# Patient Record
Sex: FEMALE | Race: BLACK OR AFRICAN AMERICAN | Employment: PART TIME | URBAN - METROPOLITAN AREA
[De-identification: names, ages, dates, MRNs, and addresses within clinical notes are randomized per-mention and may not be internally consistent; named-entity substitution may affect disease eponyms.]

---

## 2017-03-17 ENCOUNTER — HOSPITAL ENCOUNTER (OUTPATIENT)
Dept: GENERAL RADIOLOGY | Age: 37
Discharge: HOME | End: 2017-03-17
Attending: FAMILY MEDICINE

## 2017-03-17 DIAGNOSIS — R10.9 ABDOMINAL PAIN: ICD-10-CM

## 2017-03-27 ENCOUNTER — HOSPITAL ENCOUNTER (OUTPATIENT)
Dept: GENERAL RADIOLOGY | Age: 37
Discharge: HOME | End: 2017-03-27
Attending: INTERNAL MEDICINE
Payer: COMMERCIAL

## 2017-03-27 DIAGNOSIS — K58.0 IRRITABLE BOWEL SYNDROME WITH DIARRHEA: ICD-10-CM

## 2017-03-27 DIAGNOSIS — R10.9 ABDOMINAL PAIN: ICD-10-CM

## 2017-03-27 PROCEDURE — 74011000250 HC RX REV CODE- 250: Performed by: INTERNAL MEDICINE

## 2017-03-27 PROCEDURE — 74011000255 HC RX REV CODE- 255: Performed by: INTERNAL MEDICINE

## 2017-03-27 PROCEDURE — 74245 XR UPPER GI/SMALL BOWEL: CPT

## 2017-03-27 RX ADMIN — BARIUM SULFATE 135 ML: 980 POWDER, FOR SUSPENSION ORAL at 10:30

## 2017-03-27 RX ADMIN — ANTACID/ANTIFLATULENT 4 G: 380; 550; 10; 10 GRANULE, EFFERVESCENT ORAL at 10:30

## 2017-03-27 RX ADMIN — BARIUM SULFATE 176 G: 960 POWDER, FOR SUSPENSION ORAL at 10:30

## 2017-08-02 ENCOUNTER — HOSPITAL ENCOUNTER (OUTPATIENT)
Dept: GENERAL RADIOLOGY | Age: 37
Discharge: HOME | End: 2017-08-02
Payer: COMMERCIAL

## 2017-08-02 DIAGNOSIS — M25.512 ACUTE PAIN OF LEFT SHOULDER: ICD-10-CM

## 2017-08-02 DIAGNOSIS — M54.2 NECK PAIN, ACUTE: ICD-10-CM

## 2017-08-02 PROCEDURE — 73030 X-RAY EXAM OF SHOULDER: CPT

## 2017-08-02 PROCEDURE — 72040 X-RAY EXAM NECK SPINE 2-3 VW: CPT

## 2017-09-08 ENCOUNTER — HOSPITAL ENCOUNTER (EMERGENCY)
Age: 37
Discharge: HOME | End: 2017-09-08
Attending: EMERGENCY MEDICINE
Payer: COMMERCIAL

## 2017-09-08 VITALS
HEIGHT: 66 IN | OXYGEN SATURATION: 100 % | SYSTOLIC BLOOD PRESSURE: 151 MMHG | HEART RATE: 68 BPM | BODY MASS INDEX: 28.93 KG/M2 | WEIGHT: 180 LBS | TEMPERATURE: 97.4 F | RESPIRATION RATE: 16 BRPM | DIASTOLIC BLOOD PRESSURE: 100 MMHG

## 2017-09-08 DIAGNOSIS — R07.89 ATYPICAL CHEST PAIN: Primary | ICD-10-CM

## 2017-09-08 LAB
ANION GAP SERPL CALC-SCNC: 7 MMOL/L (ref 3–18)
ATRIAL RATE: 61 BPM
BASOPHILS # BLD: 0 K/UL (ref 0–0.06)
BASOPHILS NFR BLD: 1 % (ref 0–2)
BUN SERPL-MCNC: 7 MG/DL (ref 7–18)
BUN/CREAT SERPL: 8 (ref 12–20)
CALCIUM SERPL-MCNC: 9.7 MG/DL (ref 8.5–10.1)
CALCULATED P AXIS, ECG09: 43 DEGREES
CALCULATED R AXIS, ECG10: 1 DEGREES
CALCULATED T AXIS, ECG11: 86 DEGREES
CHLORIDE SERPL-SCNC: 103 MMOL/L (ref 100–108)
CK MB CFR SERPL CALC: NORMAL % (ref 0–4)
CK MB SERPL-MCNC: <1 NG/ML (ref 5–25)
CK SERPL-CCNC: 84 U/L (ref 26–192)
CO2 SERPL-SCNC: 32 MMOL/L (ref 21–32)
CREAT SERPL-MCNC: 0.86 MG/DL (ref 0.6–1.3)
DIAGNOSIS, 93000: NORMAL
DIFFERENTIAL METHOD BLD: ABNORMAL
EOSINOPHIL # BLD: 0.1 K/UL (ref 0–0.4)
EOSINOPHIL NFR BLD: 2 % (ref 0–5)
ERYTHROCYTE [DISTWIDTH] IN BLOOD BY AUTOMATED COUNT: 16.3 % (ref 11.6–14.5)
GLUCOSE SERPL-MCNC: 89 MG/DL (ref 74–99)
HCT VFR BLD AUTO: 39.2 % (ref 35–45)
HGB BLD-MCNC: 12.6 G/DL (ref 12–16)
LYMPHOCYTES # BLD: 2 K/UL (ref 0.9–3.6)
LYMPHOCYTES NFR BLD: 46 % (ref 21–52)
MCH RBC QN AUTO: 25.9 PG (ref 24–34)
MCHC RBC AUTO-ENTMCNC: 32.1 G/DL (ref 31–37)
MCV RBC AUTO: 80.7 FL (ref 74–97)
MONOCYTES # BLD: 0.4 K/UL (ref 0.05–1.2)
MONOCYTES NFR BLD: 11 % (ref 3–10)
NEUTS SEG # BLD: 1.7 K/UL (ref 1.8–8)
NEUTS SEG NFR BLD: 40 % (ref 40–73)
P-R INTERVAL, ECG05: 172 MS
PLATELET # BLD AUTO: 295 K/UL (ref 135–420)
PMV BLD AUTO: 10.1 FL (ref 9.2–11.8)
POTASSIUM SERPL-SCNC: 3.7 MMOL/L (ref 3.5–5.5)
Q-T INTERVAL, ECG07: 432 MS
QRS DURATION, ECG06: 90 MS
QTC CALCULATION (BEZET), ECG08: 434 MS
RBC # BLD AUTO: 4.86 M/UL (ref 4.2–5.3)
SODIUM SERPL-SCNC: 142 MMOL/L (ref 136–145)
TROPONIN I SERPL-MCNC: <0.02 NG/ML (ref 0–0.06)
VENTRICULAR RATE, ECG03: 61 BPM
WBC # BLD AUTO: 4.2 K/UL (ref 4.6–13.2)

## 2017-09-08 PROCEDURE — 82550 ASSAY OF CK (CPK): CPT | Performed by: EMERGENCY MEDICINE

## 2017-09-08 PROCEDURE — 93005 ELECTROCARDIOGRAM TRACING: CPT

## 2017-09-08 PROCEDURE — 99283 EMERGENCY DEPT VISIT LOW MDM: CPT

## 2017-09-08 PROCEDURE — 80048 BASIC METABOLIC PNL TOTAL CA: CPT | Performed by: EMERGENCY MEDICINE

## 2017-09-08 PROCEDURE — 85025 COMPLETE CBC W/AUTO DIFF WBC: CPT | Performed by: EMERGENCY MEDICINE

## 2017-09-08 NOTE — ED PROVIDER NOTES
HPI Comments: 10:43 AM Arlyn Valladares is a 39 y.o. female with a history of DM and thyroid disease who presents to ED for the evaluation of constant dull right sided CP that began six days ago. Pt states that she had a colonoscopy on 9/1/2017. She explains that lifting her arms up makes her pain worse. Pt says that she has had CP before but no previous heart problems. Denies COB, nausea, and abdominal pain. No other complaints, associated symptoms or modifying factors at this time. PCP: Anabelle Mcgarry MD      The history is provided by the patient. Past Medical History:   Diagnosis Date    Diabetes ([address]     Pt. states pre-diabetic    Female bladder prolapse     Thyroid disease     Pt. states they are watching her levels.  Unspecified urinary incontinence        Past Surgical History:   Procedure Laterality Date    COLONOSCOPY N/A 9/1/2017    COLONOSCOPY, DIAGNOSTIC /c Bx performed by Pernell Beyer MD at [place] [address]      Pt with 2 previous abortions, and multiple surgeries to release adhesions         Family History:   Problem Relation Age of Onset    Hypertension Father     Hypertension Paternal Grandfather        Social History     Social History    Marital status: SINGLE     Spouse name: N/A    Number of children: N/A    Years of education: N/A     Occupational History    Not on file. Social History Main Topics    Smoking status: Never Smoker    Smokeless tobacco: Never Used    Alcohol use Yes      Comment: occassionally    Drug use: No    Sexual activity: Not Currently     Other Topics Concern    Not on file     Social History Narrative         ALLERGIES: Review of patient's allergies indicates no known allergies. Review of Systems   Constitutional: Negative for chills, fatigue and fever. HENT: Negative. Negative for sore throat. Eyes: Negative. Respiratory: Negative for cough and shortness of breath.     Cardiovascular: Positive for chest pain. Negative for palpitations. Gastrointestinal: Negative for abdominal pain, nausea and vomiting. Genitourinary: Negative for dysuria. Musculoskeletal: Negative. Skin: Negative. Neurological: Negative for dizziness, weakness, light-headedness and headaches. Psychiatric/Behavioral: Negative. All other systems reviewed and are negative. Vitals:    09/08/17 1037   BP: (!) 151/100   Pulse: 68   Resp: 16   Temp: 97.4 °F (36.3 °C)   SpO2: 100%   Weight: 81.6 kg (180 lb)   Height: 5' 6\" (1.676 m)            Physical Exam   Constitutional: She is oriented to person, place, and time. She appears well-developed and well-nourished. HENT:   Head: Normocephalic and atraumatic. Mouth/Throat: Oropharynx is clear and moist.   Eyes: Conjunctivae are normal. Pupils are equal, round, and reactive to light. No scleral icterus. Neck: Normal range of motion. Neck supple. No JVD present. No tracheal deviation present. Cardiovascular: Normal rate, regular rhythm and normal heart sounds. Pulmonary/Chest: Effort normal and breath sounds normal. No respiratory distress. She has no wheezes. Abdominal: Soft. Bowel sounds are normal.   Musculoskeletal: Normal range of motion. Neurological: She is alert and oriented to person, place, and time. She has normal strength. Gait normal. GCS eye subscore is 4. GCS verbal subscore is 5. GCS motor subscore is 6. Skin: Skin is warm and dry. Psychiatric: She has a normal mood and affect. Nursing note and vitals reviewed. MDM  Number of Diagnoses or Management Options  Diagnosis management comments: Pt resting comfortably, results reviewed with pt, she agrees with out pt F/U with her PCP for further evaluation.   Azeem Velasquez MD  11:22 AM         Amount and/or Complexity of Data Reviewed  Clinical lab tests: ordered and reviewed  Independent visualization of images, tracings, or specimens: yes      ED Course Procedures    Vitals:  Patient Vitals for the past 12 hrs:   Temp Pulse Resp BP SpO2   09/08/17 1037 97.4 °F (36.3 °C) 68 16 (!) 151/100 100 %         Medications ordered:   Medications - No data to display      Lab findings:  Recent Results (from the past 12 hour(s))   EKG, 12 LEAD, INITIAL    Collection Time: 09/08/17 10:37 AM   Result Value Ref Range    Ventricular Rate 61 BPM    Atrial Rate 61 BPM    P-R Interval 172 ms    QRS Duration 90 ms    Q-T Interval 432 ms    QTC Calculation (Bezet) 434 ms    Calculated P Axis 43 degrees    Calculated R Axis 1 degrees    Calculated T Axis 86 degrees    Diagnosis       Normal sinus rhythm  Nonspecific T wave abnormality  Abnormal ECG  When compared with ECG of 24-NOV-2015 00:33,  No significant change was found         EKG interpretation by ED Physician:  10:47 AM As read by provider, NSR 61 with no acute changes. X-Ray, CT or other radiology findings or impressions:  No results found. Progress notes, Consult notes or additional Procedure notes:       Disposition:  Diagnosis: No diagnosis found. Disposition:     Follow-up Information     None           Patient's Medications   Start Taking    No medications on file   Continue Taking    BUDESONIDE PO    Take 3 mg by mouth three (3) times daily. DICYCLOMINE (BENTYL) 20 MG TABLET    Take 20 mg by mouth every six (6) hours as needed. MESALAMINE (PENTASA PO)    Take  by mouth two (2) times a day.    These Medications have changed    No medications on file   Stop Taking    No medications on file       Scribe Attestation:   Asuncion Easley acting as a scribe for and in the presence of Bandar Wilcox MD September 08, 2017 at 10:47 AM     Signed by: Lay Barry, September 08, 2017 at 10:47 AM     Provider Attestation:   I personally performed the services described in the documentation, reviewed the documentation, as recorded by the scribe in my presence, and it accurately and completely records my words and actions.      Reviewed and signed by:  Felipa Hutchinson MD

## 2017-09-08 NOTE — ED TRIAGE NOTES
Pt states  r sided cp  Started  On the 2nd the day after having  A colonoscopy  States 2 days ago it moved to the left side of the chest

## 2017-09-08 NOTE — ED TRIAGE NOTES
Pt states she has had  2 abnormal EKG's at the MD office  In the past, most recent this spring states had echo done last week at Dr Corin Brown office

## 2017-09-10 ENCOUNTER — HOSPITAL ENCOUNTER (EMERGENCY)
Age: 37
Discharge: HOME | End: 2017-09-10
Attending: EMERGENCY MEDICINE
Payer: COMMERCIAL

## 2017-09-10 VITALS
OXYGEN SATURATION: 100 % | HEIGHT: 66 IN | DIASTOLIC BLOOD PRESSURE: 87 MMHG | RESPIRATION RATE: 15 BRPM | BODY MASS INDEX: 28.93 KG/M2 | WEIGHT: 180 LBS | HEART RATE: 57 BPM | TEMPERATURE: 98.3 F | SYSTOLIC BLOOD PRESSURE: 127 MMHG

## 2017-09-10 DIAGNOSIS — S16.1XXA CERVICAL STRAIN, INITIAL ENCOUNTER: Primary | ICD-10-CM

## 2017-09-10 PROCEDURE — 99283 EMERGENCY DEPT VISIT LOW MDM: CPT

## 2017-09-10 PROCEDURE — 74011250637 HC RX REV CODE- 250/637: Performed by: PHYSICIAN ASSISTANT

## 2017-09-10 RX ORDER — NAPROXEN 500 MG/1
500 TABLET ORAL 2 TIMES DAILY WITH MEALS
Qty: 20 TAB | Refills: 0 | Status: SHIPPED | OUTPATIENT
Start: 2017-09-10 | End: 2017-09-20

## 2017-09-10 RX ORDER — NAPROXEN 250 MG/1
500 TABLET ORAL
Status: DISCONTINUED | OUTPATIENT
Start: 2017-09-10 | End: 2017-09-10

## 2017-09-10 RX ORDER — CYCLOBENZAPRINE HCL 10 MG
10 TABLET ORAL
Status: COMPLETED | OUTPATIENT
Start: 2017-09-10 | End: 2017-09-10

## 2017-09-10 RX ORDER — ACETAMINOPHEN 500 MG
500 TABLET ORAL
Status: COMPLETED | OUTPATIENT
Start: 2017-09-10 | End: 2017-09-10

## 2017-09-10 RX ORDER — CYCLOBENZAPRINE HCL 10 MG
10 TABLET ORAL
Qty: 14 TAB | Refills: 0 | Status: SHIPPED | OUTPATIENT
Start: 2017-09-10 | End: 2019-04-19

## 2017-09-10 RX ADMIN — CYCLOBENZAPRINE HYDROCHLORIDE 10 MG: 10 TABLET, FILM COATED ORAL at 15:10

## 2017-09-10 RX ADMIN — ACETAMINOPHEN 500 MG: 500 TABLET ORAL at 15:10
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
## 2017-09-10 NOTE — ED NOTES
Pt medicated per [facility] instructions for 10/10 left sided neck and shoulder pain with movement and 5/10 pain at rest  Pt states her comfortable level of pain is 5/10  Pt updated on POC  Pt's family remains at bedside  Bed locked and in lowest position  Call bell within reach

## 2017-09-10 NOTE — DISCHARGE INSTRUCTIONS
Neck Strain: Care Instructions  Your Care Instructions  You have strained the muscles and ligaments in your neck. A sudden, awkward movement can strain the neck. This often occurs with falls or car accidents or during certain sports. Everyday activities like working on a computer or sleeping can also cause neck strain if they force you to hold your neck in an awkward position for a long time. It is common for neck pain to get worse for a day or two after an injury, but it should start to feel better after that. You may have more pain and stiffness for several days before it gets better. This is expected. It may take a few weeks or longer for it to heal completely. Good home treatment can help you get better faster and avoid future neck problems. Follow-up care is a key part of your treatment and safety. Be sure to make and go to all appointments, and call your doctor if you are having problems. It's also a good idea to know your test results and keep a list of the medicines you take. How can you care for yourself at home? · If you were given a neck brace (cervical collar) to limit neck motion, wear it as instructed for as many days as your doctor tells you to. Do not wear it longer than you were told to. Wearing a brace for too long can make neck stiffness worse and weaken the neck muscles. · You can try using heat or ice to see if it helps. ¨ Try using a heating pad on a low or medium setting for 15 to 20 minutes every 2 to 3 hours. Try a warm shower in place of one session with the heating pad. You can also buy single-use heat wraps that last up to 8 hours. ¨ You can also try an ice pack for 10 to 15 minutes every 2 to 3 hours. · Take pain medicines exactly as directed. ¨ If the doctor gave you a prescription medicine for pain, take it as prescribed. ¨ If you are not taking a prescription pain medicine, ask your doctor if you can take an over-the-counter medicine.   · Gently rub the area to relieve pain and help with blood flow. Do not massage the area if it hurts to do so. · Do not do anything that makes the pain worse. Take it easy for a couple of days. You can do your usual activities if they do not hurt your neck or put it at risk for more stress or injury. · Try sleeping on a special neck pillow. Place it under your neck, not under your head. Placing a tightly rolled-up towel under your neck while you sleep will also work. If you use a neck pillow or rolled towel, do not use your regular pillow at the same time. · To prevent future neck pain, do exercises to stretch and strengthen your neck and back. Learn how to use good posture, safe lifting techniques, and proper body mechanics. When should you call for help? Call 911 anytime you think you may need emergency care. For example, call if:  · You are unable to move an arm or a leg at all. Call your doctor now or seek immediate medical care if:  · You have new or worse symptoms in your arms, legs, chest, belly, or buttocks. Symptoms may include:  ¨ Numbness or tingling. ¨ Weakness. ¨ Pain. · You lose bladder or bowel control. Watch closely for changes in your health, and be sure to contact your doctor if:  · You are not getting better as expected. Where can you learn more? Go to http://isadora-cindy.info/. Enter M253 in the search box to learn more about \"Neck Strain: Care Instructions. \"  Current as of: March 21, 2017  Content Version: 11.3  © 0242-4743 Healthwise, Incorporated. Care instructions adapted under license by [employer] (which disclaims liability or warranty for this information). If you have questions about a medical condition or this instruction, always ask your healthcare professional. [address] any warranty or liability for your use of this information. [employer] Activation    Thank you for requesting access to [employer].  Please follow the instructions below to securely access and download your online medical record. i[employer]Time allows you to send messages to your doctor, view your test results, renew your prescriptions, schedule appointments, and more. How Do I Sign Up? 1. In your internet browser, go to [email or web address]  2. Click on the First Time User? Click Here link in the Sign In box. You will be redirect to the New Member Sign Up page. 3. Enter your iStoryTime Access Code exactly as it appears below. You will not need to use this code after youve completed the sign-up process. If you do not sign up before the expiration date, you must request a new code. i[employer]Time Access Code: Activation code not generated  Current iStoryTime Status: Patient Declined (This is the date your iStoryTime access code will )    4. Enter the last four digits of your Social Security Number (xxxx) and Date of Birth (mm/dd/yyyy) as indicated and click Submit. You will be taken to the next sign-up page. 5. Create a iStoryTime ID. This will be your iStoryTime login ID and cannot be changed, so think of one that is secure and easy to remember. 6. Create a iStoryTime password. You can change your password at any time. 7. Enter your Password Reset Question and Answer. This can be used at a later time if you forget your password. 8. Enter your e-mail address. You will receive e-mail notification when new information is available in [address] 9. Click Sign Up. You can now view and download portions of your medical record. 10. Click the Download Summary menu link to download a portable copy of your medical information. Additional Information    If you have questions, please visit the Frequently Asked Questions section of the i[employer] website at [email or web address] Remember, i[employer]Time is NOT to be used for urgent needs. For medical emergencies, dial 911.

## 2017-09-10 NOTE — ED NOTES
Pain with movement has decreased from 10/10 to 6/10  Discharge instructions reviewed with pt and family  Pt verbalizes understanding  No IV placed during ED visit  Pt discharged with family.   Pt's belongings/valuables sent with pt  Patient armband removed and shredded  Pt condition stable

## 2017-09-11 ENCOUNTER — HOSPITAL ENCOUNTER (OUTPATIENT)
Dept: PHYSICAL THERAPY | Age: 37
Discharge: HOME | End: 2017-09-11
Payer: COMMERCIAL

## 2017-09-11 PROCEDURE — 97110 THERAPEUTIC EXERCISES: CPT

## 2017-09-11 PROCEDURE — 97161 PT EVAL LOW COMPLEX 20 MIN: CPT

## 2017-09-11 PROCEDURE — 97014 ELECTRIC STIMULATION THERAPY: CPT

## 2017-09-11 NOTE — PROGRESS NOTES
PT DAILY TREATMENT NOTE 8-    Patient Name: Stella Smoker  Date:2017  : 1980  [x]  Patient  Verified  Payor: Fei Escoto / Plan: [employer] / Product Type: HMO /    In time:810  Out time:845  Total Treatment Time (min): 35  Visit #: 1 of 8    Treatment Area: Cervicalgia [M54.2]  Pain in left shoulder [M25.512]    SUBJECTIVE  Pain Level (0-10 scale): 9  Any medication changes, allergies to medications, adverse drug reactions, diagnosis change, or new procedure performed?: [x] No    [] Yes (see summary sheet for update)  Subjective functional status/changes:   [x] See Eval form in paper chart     OBJECTIVE      16 Min [x]Eval                  []Re-Eval         Modality rationale: decrease pain and increase tissue extensibility to improve the patients ability to perform ADLs.     Min Type Additional Details   10 [x] Estim:  [x]Unatt       [x]IFC  []Premod                        []Other:  []w/ice   [x]w/heat  Position: supine with legs on wedge  Location:neck    [] Estim: []Att    []TENS instruct  []NMES                    []Other:  []w/US   []w/ice   []w/heat  Position:  Location:    []  Traction: [] Cervical       []Lumbar                       [] Prone          []Supine                       []Intermittent   []Continuous Lbs:  [] before manual  [] after manual    []  Ultrasound: []Continuous   [] Pulsed                           []1MHz   []3MHz Location:  W/cm2:    []  Iontophoresis with dexamethasone         Location: [] Take home patch   [] In clinic    []  Ice     []  heat  []  Ice massage  []  Laser   []  Anodyne Position:  Location:    []  Laser with stim  []  Other: Position:  Location:    []  Vasopneumatic Device Pressure:       [] lo [] med [] hi   Temperature: [] lo [] med [] hi   [x] Skin assessment post-treatment:  [x]intact []redness- no adverse reaction    []redness - adverse reaction:     8 min Therapeutic Exercise:  [x] See flow sheet :HEP   Rationale: increase ROM, increase strength, improve coordination, improve balance and increase proprioception to improve the patients ability to perform ADLs. With   [] TE   [] TA   [] neuro   [] other: Patient Education: [x] Review HEP    [] Progressed/Changed HEP based on:   [] positioning   [] body mechanics   [] transfers   [] heat/ice application    [] other:           Pain Level (0-10 scale) post treatment: 7-8    ASSESSMENT:   [x]  See Evaluation          Goals:  Short Term Goals: To be accomplished in 1 weeks:  1. Establish HEP for ROM & Strengthening.     Long Term Goals: To be accomplished in 4 weeks:  1. Patient will be independent with HEP for ROM & Strengthening. Eval Status:n/a  2. Pt will increase cervical rotation and SB bilaterally by 5 degrees to increase ease with ADLs/driving. Eval Status:R rot: 50, L rot: 40, R SB: 18, L SB: 10  3. Pt will increase FOTO score to 67/70 points to demostrate improved function. Eval Status: FOTO: 43/48  4. Pt will report average pain rating to <5/10 to improve ease with ADLs/work tasks.                          Eval Status:9/10    PLAN      [x]  Continue plan of care           Nabeel Wood, PT 9/11/2017  8:52 AM
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
## 2017-09-18 ENCOUNTER — APPOINTMENT (OUTPATIENT)
Dept: PHYSICAL THERAPY | Age: 37
End: 2017-09-18
Payer: COMMERCIAL

## 2017-09-20 ENCOUNTER — HOSPITAL ENCOUNTER (OUTPATIENT)
Dept: PHYSICAL THERAPY | Age: 37
Discharge: HOME | End: 2017-09-20
Payer: COMMERCIAL

## 2017-09-20 PROCEDURE — 97140 MANUAL THERAPY 1/> REGIONS: CPT

## 2017-09-20 PROCEDURE — 97110 THERAPEUTIC EXERCISES: CPT

## 2017-09-20 PROCEDURE — 97112 NEUROMUSCULAR REEDUCATION: CPT

## 2017-09-20 NOTE — PROGRESS NOTES
PT DAILY TREATMENT NOTE     Patient Name: Maryann Sanchez  Date:2017  : 1980  [x]  Patient  Verified  Payor: Theresa Martinez / Plan: Christel Hernandes HMO / Product Type: HMO /    In time:1024  Out time:1106  Total Treatment Time (min): 42  Visit #: 2 of 8    Treatment Area: Cervicalgia [M54.2]  Pain in left shoulder [M25.512]    SUBJECTIVE  Pain Level (0-10 scale): 2  Any medication changes, allergies to medications, adverse drug reactions, diagnosis change, or new procedure performed?: [x] No    [] Yes (see summary sheet for update)  Subjective functional status/changes:   [] No changes reported  \"I'm not hurting as bad today. \"    OBJECTIVE    Modality rationale: decrease pain to improve the patients ability to improve mobility and positional tolerance    Min Type Additional Details    [] Estim:  []Unatt       []IFC  []Premod                        []Other:  []w/ice   []w/heat  Position:  Location:    [] Estim: []Att    []TENS instruct  []NMES                    []Other:  []w/US   []w/ice   []w/heat  Position:  Location:    []  Traction: [] Cervical       []Lumbar                       [] Prone          []Supine                       []Intermittent   []Continuous Lbs:  [] before manual  [] after manual    []  Ultrasound: []Continuous   [] Pulsed                           []1MHz   []3MHz W/cm2:  Location:    []  Iontophoresis with dexamethasone         Location: [] Take home patch   [] In clinic   10 []  Ice     [x]  heat  []  Ice massage  []  Laser   []  Anodyne Position: seated   Location: L shoulder/neck    []  Laser with stim  []  Other:  Position:  Location:    []  Vasopneumatic Device Pressure:       [] lo [] med [] hi   Temperature: [] lo [] med [] hi   [x] Skin assessment post-treatment:  [x]intact []redness- no adverse reaction    []redness - adverse reaction:     12 min Therapeutic Exercise:  [x] See flow sheet :   Rationale: increase ROM and increase strength to improve the patients ability to perform ADLs    10 min Neuromuscular Re-education:  [x]  See flow sheet : postural re-ed activities   Rationale: increase ROM, increase strength, improve coordination, improve balance and increase proprioception  to improve the patients ability to improve upright posture    10 min Manual Therapy:  SOR, STM to c/s paraspinals and B UT   Rationale: decrease pain, increase ROM, increase tissue extensibility, decrease trigger points and increase postural awareness to improve mobility and positional tolerance           With   [x] TE   [] TA   [x] neuro   [] other: Patient Education: [x] Review HEP    [] Progressed/Changed HEP based on:   [x] positioning   [x] body mechanics   [] transfers   [] heat/ice application    [] other:      Other Objective/Functional Measures:      Pain Level (0-10 scale) post treatment: 0    ASSESSMENT/Changes in Function: Initiated POC to which pt responded well. She reports improved pain today upon entering therapy. Did well with initial stretching activities. Patient will continue to benefit from skilled PT services to modify and progress therapeutic interventions, address functional mobility deficits, address ROM deficits, address strength deficits, analyze and address soft tissue restrictions, analyze and cue movement patterns, analyze and modify body mechanics/ergonomics, assess and modify postural abnormalities, address imbalance/dizziness and instruct in home and community integration to attain remaining goals. [x]  See Plan of Care  []  See progress note/recertification  []  See Discharge Summary         Progress towards goals / Updated goals:  Short Term Goals: To be accomplished in 1 weeks:  1. Establish HEP for ROM & Strengthening.     MET  Long Term Goals: To be accomplished in 4 weeks:  1. Patient will be independent with HEP for ROM & Strengthening. Eval Status:n/a  2.  Pt will increase cervical rotation and SB bilaterally by 5 degrees to increase ease with ADLs/driving. Eval Status:R rot: 50, L rot: 40, R SB: 18, L SB: 10  3. Pt will increase FOTO score to 67/70 points to demostrate improved function. Eval Status: FOTO: 43/48  4. Pt will report average pain rating to <5/10 to improve ease with ADLs/work tasks.     Eval Status:9/10    PLAN  []  Upgrade activities as tolerated     [x]  Continue plan of care  []  Update interventions per flow sheet       []  Discharge due to:_  []  Other:_      Lawyer Brittney PTA, [facility] 9/20/2017  11:08 AM    Future Appointments  Date Time Provider Diogo Gianna   9/20/2017 10:30 AM Lawyer Brittney PTA [facility]   10/2/2017 3:40 PM Lady Bland MD [address] [place] [address]

## 2017-10-03 ENCOUNTER — HOSPITAL ENCOUNTER (OUTPATIENT)
Dept: PHYSICAL THERAPY | Age: 37
Discharge: HOME | End: 2017-10-03
Payer: COMMERCIAL

## 2017-10-03 PROCEDURE — 97140 MANUAL THERAPY 1/> REGIONS: CPT

## 2017-10-03 PROCEDURE — 97112 NEUROMUSCULAR REEDUCATION: CPT

## 2017-10-03 PROCEDURE — 97110 THERAPEUTIC EXERCISES: CPT

## 2017-10-03 NOTE — PROGRESS NOTES
PT DAILY TREATMENT NOTE     Patient Name: Angelique Riley  Date:10/3/2017  : 1980  [x]  Patient  Verified  Payor: Stef Munira / Plan: [employer]  [facility] / Product Type: Commerical /    In time:755  Out time:840  Total Treatment Time (min): 45  Visit #: 3 of 8    Treatment Area: Cervicalgia [M54.2]  Pain in left shoulder [M25.512]    SUBJECTIVE  Pain Level (0-10 scale): 1  Any medication changes, allergies to medications, adverse drug reactions, diagnosis change, or new procedure performed?: [x] No    [] Yes (see summary sheet for update)  Subjective functional status/changes:   [] No changes reported  \"I'm doing better. \"    OBJECTIVE    Modality rationale: decrease pain and increase tissue extensibility to improve the patients ability to improve ease with mobility.    Min Type Additional Details    [] Estim:  []Unatt       []IFC  []Premod                        []Other:  []w/ice   []w/heat  Position:  Location:    [] Estim: []Att    []TENS instruct  []NMES                    []Other:  []w/US   []w/ice   []w/heat  Position:  Location:    []  Traction: [] Cervical       []Lumbar                       [] Prone          []Supine                       []Intermittent   []Continuous Lbs:  [] before manual  [] after manual    []  Ultrasound: []Continuous   [] Pulsed                           []1MHz   []3MHz W/cm2:  Location:    []  Iontophoresis with dexamethasone         Location: [] Take home patch   [] In clinic   10 []  Ice     [x]  heat  []  Ice massage  []  Laser   []  Anodyne Position:supine  Location:neck & L shoulder    []  Laser with stim  []  Other:  Position:  Location:    []  Vasopneumatic Device Pressure:       [] lo [] med [] hi   Temperature: [] lo [] med [] hi   [x] Skin assessment post-treatment:  [x]intact []redness- no adverse reaction    []redness - adverse reaction:     10 min Therapeutic Exercise:  [x] See flow sheet :   Rationale: increase ROM, increase strength and improve coordination to improve the patients ability to perform ADLs. 15 min Neuromuscular Re-education:  [x]  See flow sheet :   Rationale: increase ROM, increase strength, improve coordination, improve balance and increase proprioception  to improve the patients ability to maintain upright posturing. 10 min Manual Therapy:  SOR, STM to c/s paraspinals and B UT   Rationale: decrease pain, increase ROM, increase tissue extensibility, decrease trigger points and increase postural awareness to improve positional tolerance/mobility. With   [] TE   [] TA   [] neuro   [] other: Patient Education: [x] Review HEP    [] Progressed/Changed HEP based on:   [] positioning   [] body mechanics   [] transfers   [] heat/ice application    [] other:      Other Objective/Functional Measures:      Pain Level (0-10 scale) post treatment: 1    ASSESSMENT/Changes in Function: Ms. Tennille Nevarez reports improved ease with ADLs & less pain over the past 2 visits. We will progress exercises as able. Patient will continue to benefit from skilled PT services to modify and progress therapeutic interventions, address functional mobility deficits, address ROM deficits, address strength deficits, analyze and address soft tissue restrictions, analyze and cue movement patterns, analyze and modify body mechanics/ergonomics, assess and modify postural abnormalities, address imbalance/dizziness and instruct in home and community integration to attain remaining goals. []  See Plan of Care  []  See progress note/recertification  []  See Discharge Summary         Progress towards goals / Updated goals:  Short Term Goals: To be accomplished in 1 weeks:  1. Establish HEP for ROM & Strengthening.                        MET  Long Term Goals: To be accomplished in 4 weeks:  1. Patient will be independent with HEP for ROM & Strengthening. Eval Status:n/a  2.  Pt will increase cervical rotation and SB bilaterally by 5 degrees to increase ease with ADLs/driving. Eval Status:R rot: 50, L rot: 40, R SB: 18, L SB: 10  3. Pt will increase FOTO score to 67/70 points to demostrate improved function. Eval Status: FOTO: 43/48  4. Pt will report average pain rating to <5/10 to improve ease with ADLs/work tasks.                          Eval Status:9/10    PLAN  []  Upgrade activities as tolerated     [x]  Continue plan of care  []  Update interventions per flow sheet       []  Discharge due to:_  []  Other:_      Carmen Lowe, PT 10/3/2017  8:56 AM    Future Appointments  Date Time Provider Diogo Katz   10/5/2017 8:00 AM Subha Pimentel PTA [facility]   10/9/2017 7:30 AM Subha Pimentel PTA [facility]   10/12/2017 7:30 AM Subha Pimentel PTA [facility]

## 2017-10-05 ENCOUNTER — HOSPITAL ENCOUNTER (OUTPATIENT)
Dept: PHYSICAL THERAPY | Age: 37
Discharge: HOME | End: 2017-10-05
Payer: COMMERCIAL

## 2017-10-05 PROCEDURE — 97140 MANUAL THERAPY 1/> REGIONS: CPT

## 2017-10-05 PROCEDURE — 97110 THERAPEUTIC EXERCISES: CPT

## 2017-10-05 PROCEDURE — 97112 NEUROMUSCULAR REEDUCATION: CPT

## 2017-10-05 NOTE — PROGRESS NOTES
PT DAILY TREATMENT NOTE     Patient Name: Bola Fuller  Date:10/5/2017  : 1980  [x]  Patient  Verified  Payor: Gulshan Rose / Plan: [place] [employer]  [facility] / Product Type: Commerical /    In time:800  Out time:845  Total Treatment Time (min): 45  Visit #: 4 of 8    Treatment Area: Cervicalgia [M54.2]  Pain in left shoulder [M25.512]    SUBJECTIVE  Pain Level (0-10 scale): 5-6  Any medication changes, allergies to medications, adverse drug reactions, diagnosis change, or new procedure performed?: [x] No    [] Yes (see summary sheet for update)  Subjective functional status/changes:   [] No changes reported  \"My shoulder has been giving me more of a fit the past few days. \"    OBJECTIVE    Modality rationale: decrease pain to improve the patients ability to improve mobility and positional tolerance   Min Type Additional Details    [] Estim:  []Unatt       []IFC  []Premod                        []Other:  []w/ice   []w/heat  Position:  Location:    [] Estim: []Att    []TENS instruct  []NMES                    []Other:  []w/US   []w/ice   []w/heat  Position:  Location:    []  Traction: [] Cervical       []Lumbar                       [] Prone          []Supine                       []Intermittent   []Continuous Lbs:  [] before manual  [] after manual    []  Ultrasound: []Continuous   [] Pulsed                           []1MHz   []3MHz W/cm2:  Location:    []  Iontophoresis with dexamethasone         Location: [] Take home patch   [] In clinic   10 []  Ice     [x]  heat  []  Ice massage  []  Laser   []  Anodyne Position: supine with wedge   Location: neck/L shoulder    []  Laser with stim  []  Other:  Position:  Location:    []  Vasopneumatic Device Pressure:       [] lo [] med [] hi   Temperature: [] lo [] med [] hi   [x] Skin assessment post-treatment:  [x]intact []redness- no adverse reaction    []redness - adverse reaction:     8 min Therapeutic Exercise:  [x] See flow sheet :   Rationale: increase ROM and increase strength to improve the patients ability to perform ADLs    17 min Neuromuscular Re-education:  [x]  See flow sheet : postural re-ed activities   Rationale: increase ROM, increase strength, improve coordination, improve balance and increase proprioception  to improve the patients ability to improve upright posture    10 min Manual Therapy:  SOR, STM to c/s paraspinals and B UT, MFR to L shoulder    Rationale: decrease pain, increase ROM, increase tissue extensibility, decrease trigger points and increase postural awareness to improve mobility and positional tolerance        With   [x] TE   [] TA   [x] neuro   [] other: Patient Education: [x] Review HEP    [] Progressed/Changed HEP based on:   [x] positioning   [x] body mechanics   [] transfers   [] heat/ice application    [] other:      Other Objective/Functional Measures:      Pain Level (0-10 scale) post treatment: 0    ASSESSMENT/Changes in Function: Pt's pain fluctuates currently. Reports elevated shoulder discomfort recently. Painful during doorway stretch. Educated pt on need to complete HEP more frequently. Pt to be reassessed at her NV. Patient will continue to benefit from skilled PT services to modify and progress therapeutic interventions, address functional mobility deficits, address ROM deficits, address strength deficits, analyze and address soft tissue restrictions, analyze and cue movement patterns, analyze and modify body mechanics/ergonomics, assess and modify postural abnormalities, address imbalance/dizziness and instruct in home and community integration to attain remaining goals. [x]  See Plan of Care  []  See progress note/recertification  []  See Discharge Summary         Progress towards goals / Updated goals:  Short Term Goals: To be accomplished in 1 weeks:  1. Establish HEP for ROM & Strengthening.    MET  Long Term Goals: To be accomplished in 4 weeks:  1.  Patient will be independent with HEP for ROM & Strengthening. Eval Status:n/a   Reports completing every other day  2. Pt will increase cervical rotation and SB bilaterally by 5 degrees to increase ease with ADLs/driving. Eval Status:R rot: 50, L rot: 40, R SB: 18, L SB: 10   Measure NV  3. Pt will increase FOTO score to 67/70 points to demostrate improved function. Eval Status: FOTO: 43/48   Assess NV  4. Pt will report average pain rating to <5/10 to improve ease with ADLs/work tasks. Eval Status:9/10   Pain fluctuates currently.     PLAN  []  Upgrade activities as tolerated     [x]  Continue plan of care  []  Update interventions per flow sheet       []  Discharge due to:_  []  Other:_      Emily Espinal PTA, [facility] 10/5/2017  8:47 AM    Future Appointments  Date Time Provider Diogo Katz   10/6/2017 8:40 AM Viktoria Otoole DO [address] [place] [address]   10/9/2017 7:30 AM Emily Espinal PTA [facility]   10/12/2017 7:30 AM NORAH Vee

## 2017-10-06 ENCOUNTER — OFFICE VISIT (OUTPATIENT)
Dept: CARDIOLOGY CLINIC | Age: 37
End: 2017-10-06

## 2017-10-06 VITALS
BODY MASS INDEX: 30.22 KG/M2 | SYSTOLIC BLOOD PRESSURE: 118 MMHG | HEART RATE: 60 BPM | HEIGHT: 66 IN | DIASTOLIC BLOOD PRESSURE: 82 MMHG | OXYGEN SATURATION: 98 % | WEIGHT: 188 LBS

## 2017-10-06 DIAGNOSIS — R73.03 PREDIABETES: ICD-10-CM

## 2017-10-06 DIAGNOSIS — R07.9 CHEST PAIN, UNSPECIFIED TYPE: Primary | ICD-10-CM
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
## 2017-10-06 NOTE — PROGRESS NOTES
HPI: I saw Fernando Franklin in my office today in cardiovascular evaluation regarding problems with chest pain. Ms. Megha Beach is a pleasant 51-year-old -American female with history of some lower substernal chest discomfort which she has had on and off for couple of years and actually saw my associate Dr. Murray Reveles back on December 22, 2015 for similar problem. Dr. Murray Reveles felt that her chest discomfort was noncardiac at that time, although her pain at that time was somewhat left-sided and different from her current discomfort. She tells me that the current discomfort is lower substernal in location and described as a dull aching sensation which she would rate as 3-4/10 in severity. It is sometimes made worse with a deep breath and leaning forward, but is not associated with any chest wall tenderness. She has occasionally had some dizziness with this discomfort. She relates that neither food nor activity affect the discomfort and although it has been a problem that she has had on and off for a couple of years over the past 1-2 weeks it has been essentially constant. She denies any epigastric discomfort although she does relate that her lower chest discomfort does feel like indigestion. It should be noted that she in the past has had some mild anemia issues which were thought to be iron deficient in etiology to suggest some blood loss issues which would tend to point towards a GI etiology of her discomforts. Her risk factors for coronary disease include prediabetes and a strong family history for presenile coronary artery disease in males on the father's side of her family, but there is no presenile coronary disease in any females in the family. She thinks her cholesterol might be a little high, but she said no past history of hypertension or any smoking history and she continues to be premenopausal.    Encounter Diagnoses   Name Primary?     Chest pain, constant substernal most likely related to GERD Yes    Prediabetes         Discussion: This lady's chest discomfort really sounds clearly noncardiac and in view of the constant nature of the discomfort and my suspicion is that it is most likely GI in origin and I have recommended patient get over-the-counter Prilosec or Nexium and take 1 tablet twice a day for a couple of weeks to see if this does not help with her discomfort. If this does not seem to help I have recommended she follow-up with her family physician Dr. Yadira Myers for further evaluation and possibly referral to gastroenterology for endoscopy, although I suspect that just aggressive treatment with protein pump inhibitors should take care of the problem. Since her discomfort clearly sounds noncardiac I do not feel that any further cardiac workup is necessary at this time. PCP: Erika Guajardo MD      Past Medical History:   Diagnosis Date    Diabetes ([address]     Pt. states pre-diabetic    Female bladder prolapse     Thyroid disease     Pt. states they are watching her levels.  Unspecified urinary incontinence        Past Surgical History:   Procedure Laterality Date    COLONOSCOPY N/A 9/1/2017    COLONOSCOPY, DIAGNOSTIC /c Bx performed by Luciana Blanchard MD at [facility]      Pt with 2 previous abortions, and multiple surgeries to release adhesions       Current Outpatient Prescriptions   Medication Sig    cyclobenzaprine (FLEXERIL) 10 mg tablet Take 1 Tab by mouth three (3) times daily as needed for Muscle Spasm(s).  dicyclomine (BENTYL) 20 mg tablet Take 20 mg by mouth every six (6) hours as needed.  BUDESONIDE PO Take 3 mg by mouth three (3) times daily.  MESALAMINE (PENTASA PO) Take  by mouth two (2) times a day. No current facility-administered medications for this visit.         No Known Allergies    Social History :  Social History   Substance Use Topics    Smoking status: Never Smoker    Smokeless tobacco: Never Used   Giancarlo Shield Alcohol use Yes      Comment: occassionally        Family History: family history includes Hypertension in her father and paternal grandfather. Review of Systems:    Constitutional: Negative. Respiratory: Negative. Cardiovascular: Positive for chest pain and leg swelling. Negative for palpitations, orthopnea, claudication and PND. Gastrointestinal: Positive for diarrhea, heartburn and nausea. Negative for abdominal pain, blood in stool, constipation, melena and vomiting. Musculoskeletal: Negative. General: No weight change or constitutional symptoms. HEENT: No history of glaucoma or thyroid condition,  Respiratory: No chronic cough, sputum production, hemoptysis or wheezing. Gastrointestinal: No anorexia, dysphagia, nausea, vomiting, melana, or hematochezia. Genitourinary: No UTI symptoms, hematuria, or kidney stones. Neuro/Psychiatric: No TIA's, seizures, anxiety or memory loss. Hematologic: No bleeding or clotting problems and no anemia. Skin: No rashes. Musculoskeletal: No joint pain or muscle weakness. Physical Exam:    The patient is a cooperative, alert, well developed, well nourished 39 y.o.  female who is in no acute distress at the time of the examination. Visit Vitals    /82    Pulse 60    Ht 5' 6\" (1.676 m)    Wt 85.3 kg (188 lb)    SpO2 98%    BMI 30.34 kg/m2       HEENT: Conjuctiva white, mucosa moist, no pallor or cyanosis. NECK: Supple without masses, tenderness or thyromegaly. There was no jugular venous distention. Carotid are full bilaterally without bruits. CHEST: Symmetrical with good excursion. LUNGS: Clear to auscultation in all fields. HEART: The apex is not displaced. There were no lifts, thrills or heaves. There is a normal S1 and S2 without appreciable murmurs, rubs, clicks, or gallops auscultated. ABDOMEN: Soft without masses, tenderness or organomegaly.   EXTREMITIES: Full peripheral pulses without peripheral edema.  INTEGUMENT: Warm and dry   NEUROLOGICAL: The patient is oriented x 3 with motor function grossly intact. Review of Data: See PMH and Cardiology and Imaging sections for cardiac testing  No results found for: CHOL, CHOLX, CHLST, CHOLV, HDL, LDL, LDLC, DLDLP, TGLX, TRIGL, TRIGP, CHHD, CHHDX    Results for orders placed or performed in visit on 10/06/17   AMB POC EKG ROUTINE W/ 12 LEADS, INTER & REP     Status: None    Narrative    Sinus bradycardia rate 53. First-degree AV block. Left anterior fascicular block. This EKG is otherwise within normal limits. Aung Castellanos D.O., F.A.C.C. [facility]  [facility]  [address] [place] [address]. [address]      PLEASE NOTE:  This document has been produced using voice recognition software. Unrecognized errors in transcription may be present.

## 2017-10-06 NOTE — MR AVS SNAPSHOT
Visit Information Date & Time Provider Department Dept. Phone Encounter #  
 10/6/2017  8:40 AM Tati Dumont DO [facility] [address] [id number] Follow-up Instructions Return in about 6 months (around 4/6/2018), or if symptoms worsen or fail to improve. Upcoming Health Maintenance Date Due DTaP/Tdap/Td series (1 - Tdap) 11/13/2001 PAP AKA CERVICAL CYTOLOGY 11/8/2016 INFLUENZA AGE 9 TO ADULT 8/1/2017 Allergies as of 10/6/2017  Review Complete On: 10/6/2017 By: Tati Dumont DO No Known Allergies Current Immunizations  Never Reviewed No immunizations on file. Not reviewed this visit You Were Diagnosed With   
  
 Codes Comments Chest pain, unspecified type    -  Primary ICD-10-CM: R07.9 ICD-9-CM: 786.50 Vitals BP Pulse Height(growth percentile) Weight(growth percentile) LMP SpO2  
 118/82 60 5' 6\" (1.676 m) 188 lb (85.3 kg) 08/19/2017 98% BMI OB Status Smoking Status 30.34 kg/m2 Having regular periods Never Smoker Vitals History BMI and BSA Data Body Mass Index Body Surface Area  
 30.34 kg/m 2 1.99 m 2 Preferred Pharmacy Pharmacy Name Phone [id number] [address] Your Updated Medication List  
  
   
This list is accurate as of: 10/6/17 10:12 AM.  Always use your most recent med list.  
  
  
  
  
 BUDESONIDE PO Take 3 mg by mouth three (3) times daily. cyclobenzaprine 10 mg tablet Commonly known as:  FLEXERIL Take 1 Tab by mouth three (3) times daily as needed for Muscle Spasm(s). dicyclomine 20 mg tablet Commonly known as:  BENTYL Take 20 mg by mouth every six (6) hours as needed. PENTASA PO Take  by mouth two (2) times a day. We Performed the Following AMB POC EKG ROUTINE W/ 12 LEADS, INTER & REP [99078 CPT(R)] Follow-up Instructions Return in about 6 months (around 4/6/2018), or if symptoms worsen or fail to improve. To-Do List   
 10/09/2017 11:30 AM  
  Appointment with Emily Espinal PTA at [facility] [address] [place] [address] ([phone])  
  
 10/12/2017 12:00 PM  
  Appointment with Emily Espinal PTA at [facility] [address] [place] [address] ([phone]) Please provide this summary of care documentation to your next provider. Your primary care clinician is listed as Audrey Salas. If you have any questions after today's visit, please call [phone].

## 2017-10-06 NOTE — PROGRESS NOTES
Review of Systems   Constitutional: Negative. Respiratory: Negative. Cardiovascular: Positive for chest pain and leg swelling. Negative for palpitations, orthopnea, claudication and PND. Gastrointestinal: Positive for diarrhea, heartburn and nausea. Negative for abdominal pain, blood in stool, constipation, melena and vomiting. Musculoskeletal: Negative.

## 2017-10-09 ENCOUNTER — HOSPITAL ENCOUNTER (OUTPATIENT)
Dept: PHYSICAL THERAPY | Age: 37
Discharge: HOME | End: 2017-10-09
Payer: COMMERCIAL

## 2017-10-09 ENCOUNTER — HOSPITAL ENCOUNTER (OUTPATIENT)
Dept: LAB | Age: 37
Discharge: HOME | End: 2017-10-09

## 2017-10-09 ENCOUNTER — HOSPITAL ENCOUNTER (OUTPATIENT)
Dept: GENERAL RADIOLOGY | Age: 37
Discharge: HOME | End: 2017-10-09
Payer: COMMERCIAL

## 2017-10-09 DIAGNOSIS — R07.89 OTHER CHEST PAIN: ICD-10-CM

## 2017-10-09 LAB — SENTARA SPECIMEN COL,SENBCF: NORMAL

## 2017-10-09 PROCEDURE — 71020 XR CHEST PA LAT: CPT

## 2017-10-09 PROCEDURE — 97140 MANUAL THERAPY 1/> REGIONS: CPT

## 2017-10-09 PROCEDURE — 97112 NEUROMUSCULAR REEDUCATION: CPT

## 2017-10-09 PROCEDURE — 99001 SPECIMEN HANDLING PT-LAB: CPT | Performed by: FAMILY MEDICINE

## 2017-10-09 PROCEDURE — 97110 THERAPEUTIC EXERCISES: CPT

## 2017-10-09 NOTE — PROGRESS NOTES
PT DAILY TREATMENT NOTE     Patient Name: Imani Card  Date:10/9/2017  : 1980  [x]  Patient  Verified  Payor: Philippe Bahena / [place] [employer]  CAPITATED PT / Product Type: Commerical /    In time:1126  Out time:1216  Total Treatment Time (min): 50  Visit #: 5 of 8    Treatment Area: Cervicalgia [M54.2]  Pain in left shoulder [M25.512]    SUBJECTIVE  Pain Level (0-10 scale): 0  Any medication changes, allergies to medications, adverse drug reactions, diagnosis change, or new procedure performed?: [x] No    [] Yes (see summary sheet for update)  Subjective functional status/changes:   [] No changes reported  \"No pain. \"    OBJECTIVE    Modality rationale: decrease pain to improve the patients ability to improve mobility and positional tolerance   Min Type Additional Details    [] Estim:  []Unatt       []IFC  []Premod                        []Other:  []w/ice   []w/heat  Position:  Location:    [] Estim: []Att    []TENS instruct  []NMES                    []Other:  []w/US   []w/ice   []w/heat  Position:  Location:    []  Traction: [] Cervical       []Lumbar                       [] Prone          []Supine                       []Intermittent   []Continuous Lbs:  [] before manual  [] after manual    []  Ultrasound: []Continuous   [] Pulsed                           []1MHz   []3MHz W/cm2:  Location:    []  Iontophoresis with dexamethasone         Location: [] Take home patch   [] In clinic   10 []  Ice     [x]  heat  []  Ice massage  []  Laser   []  Anodyne Position: seated   Location: neck and L shoulder    []  Laser with stim  []  Other:  Position:  Location:    []  Vasopneumatic Device Pressure:       [] lo [] med [] hi   Temperature: [] lo [] med [] hi   [x] Skin assessment post-treatment:  [x]intact []redness- no adverse reaction    []redness - adverse reaction:     10 min Therapeutic Exercise:  [x] See flow sheet :   Rationale: increase ROM and increase strength to improve the patients ability to perform ADLs    22 min Neuromuscular Re-education:  [x]  See flow sheet : postural re-ed activities   Rationale: increase ROM, increase strength, improve coordination, improve balance and increase proprioception  to improve the patients ability to improve upright posture    8 min Manual Therapy:  SOR, STM to c/s paraspinals and B UT, MFR to L shoulder    Rationale: decrease pain, increase ROM, increase tissue extensibility, decrease trigger points and increase postural awareness to improve mobility         With   [x] TE   [] TA   [x] neuro   [] other: Patient Education: [x] Review HEP    [] Progressed/Changed HEP based on:   [x] positioning   [x] body mechanics   [] transfers   [] heat/ice application    [] other:      Other Objective/Functional Measures:   FOTO neck 59  FOTO shoulder 65  R rot: 59, L rot: 50, R SB: 25, L SB: 30    Pain Level (0-10 scale) post treatment: 0    ASSESSMENT/Changes in Function: Ms. Jose Banda has been a pleasure to treat and reports 90% improvement since beginning therapy. Pt reports ease with neck rotation while driving. She also states ease with pain and lifting objects. Her chief compliant is pain at end range with overhead reaching and continues to feel like her shoulder is going to sublux or dislocate. We will continue with therapy to address her remaining functional deficits. Patient will continue to benefit from skilled PT services to modify and progress therapeutic interventions, address functional mobility deficits, address ROM deficits, address strength deficits, analyze and address soft tissue restrictions, analyze and cue movement patterns, analyze and modify body mechanics/ergonomics, assess and modify postural abnormalities, address imbalance/dizziness and instruct in home and community integration to attain remaining goals.      [x]  See Plan of Care  [x]  See progress note/recertification  []  See Discharge Summary         Progress towards goals / Updated goals:  Short Term Goals: To be accomplished in 1 weeks:  1. Establish HEP for ROM & Strengthening. MET  Long Term Goals: To be accomplished in 4 weeks:  1. Patient will be independent with HEP for ROM & Strengthening. Eval Status:n/a   PROGRESSING; Reports completing every other day, will update as we near discharge  2. Pt will increase cervical rotation and SB bilaterally by 5 degrees to increase ease with ADLs/driving. Eval Status:R rot: 50, L rot: 40, R SB: 18, L SB: 10   MET; R rot: 59, L rot: 50, R SB: 25, L SB: 30  3. Pt will increase FOTO score to 67/70 points to demostrate improved function. Eval Status: FOTO: 43/48   PROGRESSING; 59/65  4. Pt will report average pain rating to <5/10 to improve ease with ADLs/work tasks.     Eval Status:9/10   PROGRESSING; 5/10 on average    Functional Gains: turning neck, driving, lifting, reaching into cabinets  Functional Deficits: reaching overhead to end range, feelings of shoulder subluxation   % improvement: 90%  Pain   Average: 5/10       Best: 0/10     Worst: 8-9/10  Patient Goal: \"to have my shoulder stay in place\"    PLAN  []  Upgrade activities as tolerated     [x]  Continue plan of care  []  Update interventions per flow sheet       []  Discharge due to:_  []  Other:_      Mary Beach PTA, [facility] 10/9/2017  12:18 PM    Future Appointments  Date Time Provider Diogo Katz   10/12/2017 12:00 PM Mary Beach PTA [facility]
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
## 2017-10-12 ENCOUNTER — APPOINTMENT (OUTPATIENT)
Dept: PHYSICAL THERAPY | Age: 37
End: 2017-10-12
Payer: COMMERCIAL

## 2017-10-16 ENCOUNTER — HOSPITAL ENCOUNTER (OUTPATIENT)
Dept: PHYSICAL THERAPY | Age: 37
Discharge: HOME | End: 2017-10-16
Payer: COMMERCIAL

## 2017-10-16 PROCEDURE — 97112 NEUROMUSCULAR REEDUCATION: CPT

## 2017-10-16 PROCEDURE — 97110 THERAPEUTIC EXERCISES: CPT

## 2017-10-16 PROCEDURE — 97140 MANUAL THERAPY 1/> REGIONS: CPT

## 2017-10-16 NOTE — PROGRESS NOTES
PT DAILY TREATMENT NOTE     Patient Name: Alana Shafer  Date:10/16/2017  : 1980  [x]  Patient  Verified  Payor: Gretta Awad / [place] [employer]  CAPITATED PT / Product Type: Commerical /    In time:1209  Out time:1250  Total Treatment Time (min): 41  Visit #: 6 of 8    Treatment Area: Cervicalgia [M54.2]  Pain in left shoulder [M25.512]    SUBJECTIVE  Pain Level (0-10 scale): 2  Any medication changes, allergies to medications, adverse drug reactions, diagnosis change, or new procedure performed?: [x] No    [] Yes (see summary sheet for update)  Subjective functional status/changes:   [] No changes reported  \"I had pain over the weekend. \"    OBJECTIVE    Modality rationale: decrease pain to improve the patients ability to improve mobility and positional tolerance    Min Type Additional Details    [] Estim:  []Unatt       []IFC  []Premod                        []Other:  []w/ice   []w/heat  Position:  Location:    [] Estim: []Att    []TENS instruct  []NMES                    []Other:  []w/US   []w/ice   []w/heat  Position:  Location:    []  Traction: [] Cervical       []Lumbar                       [] Prone          []Supine                       []Intermittent   []Continuous Lbs:  [] before manual  [] after manual    []  Ultrasound: []Continuous   [] Pulsed                           []1MHz   []3MHz W/cm2:  Location:    []  Iontophoresis with dexamethasone         Location: [] Take home patch   [] In clinic   10 []  Ice     [x]  heat  []  Ice massage  []  Laser   []  Anodyne Position: supine with wedge   Location: neck     []  Laser with stim  []  Other:  Position:  Location:    []  Vasopneumatic Device Pressure:       [] lo [] med [] hi   Temperature: [] lo [] med [] hi   [x] Skin assessment post-treatment:  [x]intact []redness- no adverse reaction    []redness - adverse reaction:     10 min Therapeutic Exercise:  [x] See flow sheet :   Rationale: increase ROM and increase strength to improve the patients ability to perform ADLs    13 min Neuromuscular Re-education:  [x]  See flow sheet : postural re-ed activities   Rationale: increase ROM, increase strength, improve coordination, improve balance and increase proprioception  to improve the patients ability to improve upright posture    8 min Manual Therapy:  SOR, STM to c/s paraspinals and B UT, MFR to L shoulder    Rationale: decrease pain, increase ROM, increase tissue extensibility, decrease trigger points and increase postural awareness to improve mobility          With   [x] TE   [] TA   [x] neuro   [] other: Patient Education: [x] Review HEP    [] Progressed/Changed HEP based on:   [x] positioning   [x] body mechanics   [] transfers   [] heat/ice application    [] other:      Other Objective/Functional Measures:      Pain Level (0-10 scale) post treatment: 2    ASSESSMENT/Changes in Function: Pt is progressing well with her mobility, but reports having elevated pain over the weekend. Demonstrates improved c/s rotation. Patient will continue to benefit from skilled PT services to modify and progress therapeutic interventions, address functional mobility deficits, address ROM deficits, address strength deficits, analyze and address soft tissue restrictions, analyze and cue movement patterns, analyze and modify body mechanics/ergonomics, assess and modify postural abnormalities, address imbalance/dizziness and instruct in home and community integration to attain remaining goals. [x]  See Plan of Care  []  See progress note/recertification  []  See Discharge Summary         Progress towards goals / Updated goals:  1. Patient will be independent with HEP for ROM & Strengthening. PN status:  Reports completing every other day, will update as we near discharge   Continual compliance   2. Pt will increase FOTO score to 67/70 points to demostrate improved function. PN status: ; 59/65   Assess at 8th visit  3.  Pt will report average pain rating to <5/10 to improve ease with ADLs/work tasks.     PN status: 5/10 on average   Pain fluctuates currently    PLAN  []  Upgrade activities as tolerated     [x]  Continue plan of care  []  Update interventions per flow sheet       []  Discharge due to:_  []  Other:_      Blayne Abdi PTA, CSCS 10/16/2017  12:53 PM    Future Appointments  Date Time Provider Diogo Katz   10/20/2017 2:00 PM Donny Martinez

## 2017-10-27 ENCOUNTER — HOSPITAL ENCOUNTER (OUTPATIENT)
Dept: PHYSICAL THERAPY | Age: 37
Discharge: HOME | End: 2017-10-27
Payer: COMMERCIAL

## 2017-10-27 PROCEDURE — 97112 NEUROMUSCULAR REEDUCATION: CPT

## 2017-10-27 PROCEDURE — 97140 MANUAL THERAPY 1/> REGIONS: CPT

## 2017-10-27 PROCEDURE — 97110 THERAPEUTIC EXERCISES: CPT

## 2017-10-27 NOTE — PROGRESS NOTES
PT DAILY TREATMENT NOTE 3-16    Patient Name: Sowmya Choi  Date:10/27/2017  : 1980  [x]  Patient  Verified  Payor: Fariad Screws / Plan: [employer]  CAPITATED PT / Product Type: Commerical /    In time:2:35  Out time:3:15  Total Treatment Time (min): 40  Visit #: 7 of 8    Treatment Area: Cervicalgia [M54.2]  Pain in left shoulder [M25.512]    SUBJECTIVE  Pain Level (0-10 scale): 5  Any medication changes, allergies to medications, adverse drug reactions, diagnosis change, or new procedure performed?: [] No    [x] Yes (see summary sheet for update)  Subjective functional status/changes:   [] No changes reported  Patient reports going to MD on Wednesday due to elevated pain. Per patient report, she was given Naproxen.      OBJECTIVE  Modality rationale: decrease pain and increase tissue extensibility to improve the patients ability to ease ADL tolerance   Min Type Additional Details    [] Estim:  []Unatt       []IFC  []Premod                        []Other:  []w/ice   []w/heat  Position:  Location:    [] Estim: []Att    []TENS instruct  []NMES                    []Other:  []w/US   []w/ice   []w/heat  Position:  Location:    []  Traction: [] Cervical       []Lumbar                       [] Prone          []Supine                       []Intermittent   []Continuous Lbs:  [] before manual  [] after manual    []  Ultrasound: []Continuous   [] Pulsed                           []1MHz   []3MHz Location:  W/cm2:    []  Iontophoresis with dexamethasone         Location: [] Take home patch   [] In clinic   10 []  Ice     [x]  heat  []  Ice massage  []  Laser   []  Anodyne Position: seated  Location: c/s    []  Laser with stim  []  Other: Position:  Location:    []  Vasopneumatic Device Pressure:       [] lo [] med [] hi   Temperature: [] lo [] med [] hi   [x] Skin assessment post-treatment:  [x]intact []redness- no adverse reaction    []redness - adverse reaction:     12 min Therapeutic Exercise:  [x] See flow sheet :   Rationale: increase ROM, increase strength and improve coordination to improve the patients ability to increase ease with ADLs    10 min Neuromuscular Re-education:  [x]  See flow sheet : postural re-ed activities   Rationale: increase ROM, increase strength and improve coordination  to improve the patients ability to improve postural endurance     8 min Manual Therapy:  SOR, STM/TPR to left UT   Rationale: decrease pain, increase ROM and increase tissue extensibility to ease ADL tolerance    With   [x] TE   [] TA   [] neuro   [] other: Patient Education: [x] Review HEP    [] Progressed/Changed HEP based on:   [] positioning   [] body mechanics   [] transfers   [] heat/ice application    [] other:      Other Objective/Functional Measures:   Held exercises per flowsheet due to elevated pain     Pain Level (0-10 scale) post treatment: 1-2/10    ASSESSMENT/Changes in Function:   Patient continues with elevated pain levels and is now on Naproxen. Presents with several trigger points along left UT--decreases post manual. Will continue with one remaining visit and D/C as appropriate. Patient will continue to benefit from skilled PT services to modify and progress therapeutic interventions, address functional mobility deficits, address ROM deficits, address strength deficits, analyze and address soft tissue restrictions, analyze and cue movement patterns, analyze and modify body mechanics/ergonomics and assess and modify postural abnormalities to attain remaining goals. []  See Plan of Care  []  See progress note/recertification  []  See Discharge Summary         Progress towards goals / Updated goals:  1. Patient will be independent with HEP for ROM & Strengthening. PN status:  Reports completing every other day, will update as we near discharge                        Continual compliance   2.  Pt will increase FOTO score to 67/70 points to demostrate improved function. PN status: ; 59/65                        Assess at 8th visit  3. Pt will report average pain rating to <5/10 to improve ease with ADLs/work tasks. PN status: 5/10 on average                        Pain fluctuates currently    PLAN  []  Upgrade activities as tolerated     [x]  Continue plan of care  []  Update interventions per flow sheet       []  Discharge due to:_  []  Other:_      Garland Rojas 10/27/2017  2:33 PM    No future appointments.
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
## 2017-11-27 ENCOUNTER — HOSPITAL ENCOUNTER (OUTPATIENT)
Dept: CT IMAGING | Age: 37
Discharge: HOME | End: 2017-11-27
Attending: FAMILY MEDICINE
Payer: COMMERCIAL

## 2017-11-27 DIAGNOSIS — K21.9 GASTRO-ESOPHAGEAL REFLUX DISEASE WITHOUT ESOPHAGITIS: ICD-10-CM

## 2017-11-27 DIAGNOSIS — N28.82 RIGHT URETER DILATED: ICD-10-CM

## 2017-11-27 PROCEDURE — 74176 CT ABD & PELVIS W/O CONTRAST: CPT

## 2018-03-29 ENCOUNTER — APPOINTMENT (OUTPATIENT)
Dept: PHYSICAL THERAPY | Age: 38
End: 2018-03-29

## 2018-04-05 ENCOUNTER — HOSPITAL ENCOUNTER (OUTPATIENT)
Dept: PHYSICAL THERAPY | Age: 38
Discharge: HOME | End: 2018-04-05
Payer: COMMERCIAL

## 2018-04-05 PROCEDURE — 97161 PT EVAL LOW COMPLEX 20 MIN: CPT

## 2018-04-05 PROCEDURE — 97110 THERAPEUTIC EXERCISES: CPT
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
## 2018-04-16 ENCOUNTER — HOSPITAL ENCOUNTER (OUTPATIENT)
Dept: PHYSICAL THERAPY | Age: 38
Discharge: HOME | End: 2018-04-16
Payer: COMMERCIAL

## 2018-04-16 PROCEDURE — 97140 MANUAL THERAPY 1/> REGIONS: CPT

## 2018-04-16 PROCEDURE — 97110 THERAPEUTIC EXERCISES: CPT

## 2018-04-16 NOTE — PROGRESS NOTES
PT DAILY TREATMENT NOTE 3    Patient Name: Toney Delarosa  Date:2018  : 1980  [x]  Patient  Verified  Payor: Rick Jackson / Plan: [employer]  CAPITATED PT / Product Type: Commerical /    In time:4:00  Out time: 5:19  Total Treatment Time (min): 70  Visit #: 2 of     Treatment Area: Pain in left shoulder [M25.512]    SUBJECTIVE  Pain Level (0-10 scale): 0  Any medication changes, allergies to medications, adverse drug reactions, diagnosis change, or new procedure performed?: [x] No    [] Yes (see summary sheet for update)  Subjective functional status/changes:   [] No changes reported  C/o radicular pain going into the left shoulder    OBJECTIVE  Modality rationale: decrease inflammation, decrease pain and increase tissue extensibility to improve the patients ability to tolerate increased activity levels   Min Type Additional Details    [] Estim:  []Unatt       []IFC  []Premod                        []Other:  []w/ice   []w/heat  Position:  Location:    [] Estim: []Att    []TENS instruct  []NMES                    []Other:  []w/US   []w/ice   []w/heat  Position:  Location:    []  Traction: [] Cervical       []Lumbar                       [] Prone          []Supine                       []Intermittent   []Continuous Lbs:  [] before manual  [] after manual    []  Ultrasound: []Continuous   [] Pulsed                           []1MHz   []3MHz Location:  W/cm2:    []  Iontophoresis with dexamethasone         Location: [] Take home patch   [] In clinic   20 [x]  Ice     [x]  heat  []  Ice massage  []  Laser   []  Anodyne Position: Supine  Location: Left shoulder    []  Laser with stim  []  Other: Position:  Location:    []  Vasopneumatic Device Pressure:       [] lo [] med [] hi   Temperature: [] lo [] med [] hi   [x] Skin assessment post-treatment:  [x]intact []redness- no adverse reaction    []redness - adverse reaction:     38 min Therapeutic Exercise:  [x] See flow sheet :   Rationale: increase ROM, increase strength and improve coordination to improve the patients ability to tolerate increased activity levels  12 min Manual Therapy:  TPR left splenius/scalene, left 1st rib mob, Left Cx SB mobs, Left Cx Rot mobs   Rationale: decrease pain, increase ROM, increase tissue extensibility and decrease trigger points to tolerate increased activity levels       With   [x] TE   [] TA   [] neuro   [] other: Patient Education: [x] Review HEP    [] Progressed/Changed HEP based on:   [] positioning   [] body mechanics   [] transfers   [] heat/ice application    [] other:      Other Objective/Functional Measures:   - Elev left 1st rib  - TTP with tender points and muscle tightness left Cx muscles  - Increased pain with UBE to 2-3/10 and pain at 3-4/10 with performing scapular stabs  - Overall good tolerance with first full treatment     Pain Level (0-10 scale) post treatment: 0    ASSESSMENT/Changes in Function:      Patient will continue to benefit from skilled PT services to modify and progress therapeutic interventions, address functional mobility deficits, address ROM deficits, address strength deficits, analyze and address soft tissue restrictions and analyze and cue movement patterns to attain remaining goals. []  See Plan of Care  []  See progress note/recertification  []  See Discharge Summary         Progress towards goals / Updated goals:  Short Term Goals: To be accomplished in 5 treatments:                         1 patient will have established and be I with HEP to aid with progression of skilled PT program                         EVAL issued                         CURRENT                         2 patient will have FOTO improved to 65 to show increase tolerance to her heavy household chores                         EVAL 59                         CURRENT     Long Term Goals:  To be accomplished in 8-18 treatments:                         1patient will have FOTO improved to 71 to show increase tolerance to her heavy household chores                         EVAL 59                         CURRENT                         2 patient will report overall 50% improvement with regard to use of left UE with her work and household chores with less flare ups                         EVAL flareups limit use of left UE                         CURRENT                         3 patient will have MMT Left shoulder MMG 5/5 to aid with increase tolerance to heavy household chores                         EVAL left shoulder F and abd 4, ER and IR 4+                         CURRENT    PLAN  [x]  Upgrade activities as tolerated     [x]  Continue plan of care  []  Update interventions per flow sheet       []  Discharge due to:_  []  Other:_      Scot Yao, PT 4/16/2018  7:13 PM    No future appointments.

## 2018-04-20 ENCOUNTER — APPOINTMENT (OUTPATIENT)
Dept: PHYSICAL THERAPY | Age: 38
End: 2018-04-20
Payer: COMMERCIAL

## 2018-04-23 ENCOUNTER — HOSPITAL ENCOUNTER (OUTPATIENT)
Dept: PHYSICAL THERAPY | Age: 38
Discharge: HOME | End: 2018-04-23
Payer: COMMERCIAL

## 2018-04-23 PROCEDURE — 97110 THERAPEUTIC EXERCISES: CPT

## 2018-04-23 PROCEDURE — 97140 MANUAL THERAPY 1/> REGIONS: CPT

## 2018-04-23 NOTE — PROGRESS NOTES
[facility] PT DAILY TREATMENT NOTE 12    Patient Name: Sy Market  Date:2018  : 1980  [x]  Patient  Verified  Payor: Azael Agustin / Plan: [place] [employer]  CAPITATED PT / Product Type: Commerical /    In time 2:33  Out time:3:33  Total Treatment Time (min):45  Visit #: 3 of 8-18    Treatment Area: Pain in left shoulder [M25.512]    SUBJECTIVE  Pain Level (0-10 scale): 3  Any medication changes, allergies to medications, adverse drug reactions, diagnosis change, or new procedure performed?: [x] No    [] Yes (see summary sheet for update)  Subjective functional status/changes:   [] No changes reported  Still  Some pain.     OBJECTIVE    Modality rationale: decrease edema, decrease inflammation, decrease pain and increase tissue extensibility to improve the patients ability to perform ADL    Min Type Additional Details    [] Estim:  []Unatt       []IFC  []Premod                        []Other:  []w/ice   []w/heat  Position:  Location:    [] Estim: []Att    []TENS instruct  []NMES                    []Other:  []w/US   []w/ice   []w/heat  Position:  Location:    []  Traction: [] Cervical       []Lumbar                       [] Prone          []Supine                       []Intermittent   []Continuous Lbs:  [] before manual  [] after manual    []  Ultrasound: []Continuous   [] Pulsed                           []1MHz   []3MHz W/cm2:  Location:    []  Iontophoresis with dexamethasone         Location: [] Take home patch   [] In clinic   10 [x]  Ice  post   []  heat  []  Ice massage  []  Laser   []  Anodyne Position:supine  Location:(L)  shoulder    []  Laser with stim  []  Other:  Position:  Location:    []  Vasopneumatic Device Pressure:       [] lo [] med [] hi   Temperature: [] lo [] med [] hi   [x] Skin assessment post-treatment:  [x]intact []redness- no adverse reaction    []redness - adverse reaction:      min []Eval                  []Re-Eval       27 min Therapeutic Exercise:  [x] See flow sheet :   Rationale: increase ROM and increase strength to improve the patients ability to perform ADL      min Therapeutic Activity:  []  See flow sheet :   Rationale:   to improve the patients ability to     min Neuromuscular Re-education:  []  See flow sheet :   Rationale:   to improve the patients ability to     8 min Manual Therapy:  [address] [facility] J T mob TPR (L) [place]   Rationale: decrease pain, increase ROM, increase tissue extensibility and decrease edema  to perform ADL      min Gait Training:  ___ feet with ___ device on level surfaces with ___ level of assist   Rationale: With   [x] TE   [] TA   [] neuro   [] other: Patient Education: [x] Review HEP    [] Progressed/Changed HEP based on:   [] positioning   [] body mechanics   [] transfers   [] heat/ice application    [] other:      Other Objective/Functional Measures:  Tightness (L) UT    Pain Level (0-10 scale) post treatment: 0    ASSESSMENT/Changes in Function: Discussed with pt on importance  Of performing  Stretches  To where  It  Is comfortable. Patient will continue to benefit from skilled PT services to address functional mobility deficits, address ROM deficits, address strength deficits, analyze and address soft tissue restrictions and analyze and cue movement patterns to attain remaining goals.      [x]  See Plan of Care  []  See progress note/recertification  []  See Discharge Summary         Progress towards goals / Updated goals:  Short Term Goals: To be accomplished in 5 treatments:                         5 patient will have established and be I with HEP to aid with progression of skilled PT program                         EVAL issued                         [id number]  Progressing 4/23/18                         2 patient will have FOTO improved to 65 to show increase tolerance to her heavy household chores                         EVAL 59  [address] be accomplished in 8-18 treatments:                         [id number] will have FOTO improved to 71 to show increase tolerance to her heavy household chores  [id number] patient will report overall 50% improvement with regard to use of left UE with her work and household chores with less flare ups                         EVAL flareups limit use of left UE                         CURRENT                         3 patient will have MMT Left shoulder MMG 5/5 to aid with increase tolerance to heavy household chores                         EVAL left shoulder F and abd 4, ER and IR 4+                         CURRENT    PLAN  []  Upgrade activities as tolerated     [x]  Continue plan of care  []  Update interventions per flow sheet       []  Discharge due to:_  []  Other:_      Sarika Martínez, PTA 4/23/2018  3:07 PM    No future appointments.

## 2018-05-09 ENCOUNTER — HOSPITAL ENCOUNTER (OUTPATIENT)
Dept: PHYSICAL THERAPY | Age: 38
Discharge: HOME | End: 2018-05-09
Payer: COMMERCIAL

## 2018-05-09 PROCEDURE — 97110 THERAPEUTIC EXERCISES: CPT
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
## 2018-06-08 ENCOUNTER — HOSPITAL ENCOUNTER (OUTPATIENT)
Dept: PHYSICAL THERAPY | Age: 38
Discharge: HOME | End: 2018-06-08
Payer: COMMERCIAL

## 2018-06-08 PROCEDURE — 97110 THERAPEUTIC EXERCISES: CPT

## 2018-06-08 NOTE — PROGRESS NOTES
PT DAILY TREATMENT NOTE     Patient Name: Bk Hernandez  Date:2018  : 1980  [x]  Patient  Verified  Payor: Reyes Daunt / Plan: [facility] [employer]  CAPITATED PT / Product Type: Commerical /    In time:5:00  Out time:6:00  Total Treatment Time (min): 60  Visit #: 5 of     Treatment Area: Pain in left shoulder [M25.512]    SUBJECTIVE  Pain Level (0-10 scale): 0/10  Any medication changes, allergies to medications, adverse drug reactions, diagnosis change, or new procedure performed?: [x] No    [] Yes (see summary sheet for update)  Subjective functional status/changes:   [] No changes reported  Pt has not been seen in 30 days, re-assessment obtained through FOTO and muscle testing pain control. OBJECTIVE        30 min Therapeutic Exercise:  [x] See flow sheet :   Rationale:  to improve the patients ability to return to normal activities. With   [x] TE   [] TA   [] neuro   [] other: Patient Education: [x] Review HEP    [] Progressed/Changed HEP based on:   [] positioning   [] body mechanics   [] transfers   [] heat/ice application    [] other:      Other Objective/Functional Measures: FOTO 71%, L shoulder pain at 0, L shoulder F strength is 4-/5 abd-4+/5 IR ER- 4+/5. Pain Level (0-10 scale) post treatment:  0/10    ASSESSMENT/Changes in Function: Pt is being DCed 2/2 difficulty reporting to therapy because of work schedule, goals partically or fully met and good compliance with HEP that hse has been advised to continue.   []  See Plan of Care  []  See progress note/recertification  [x]  See Discharge Summary         Progress towards goals / Updated goals:  Short Term Goals: To be accomplished in 5 treatments:                         0 patient will have established and be I with HEP to aid with progression of skilled PT program                         JORGEAL issued                         DCDLYEU  Progressing 18, RESOLVED 18                         2 patient will have FOTO improved to 65 to show increase tolerance to her heavy household chores                         EVAL 59                         CURRENT 6/8/18 -71%  [address] be accomplished in 8-18 treatments:                         [id number] will have FOTO improved to 71 to show increase tolerance to her heavy household chores                         EVAL 59                         CURRENT-71% on 6/8/18                         2 patient will report overall 50% improvement with regard to use of left UE with her work and household chores with less flare ups                         EVAL flareups limit use of left UE                         CURRENT-goal met 6/8/18                         3 patient will have MMT Left shoulder MMG 5/5 to aid with increase tolerance to heavy household chores                         EVAL left shoulder F and abd 4, ER and IR 4+                         CURRENT- L shoulder flexion is 4-/5, abd is 4+/5 and IR/ER is 4+/5. PLAN  [x]  Upgrade activities as tolerated     [x]  Continue plan of care  []  Update interventions per flow sheet       []  Discharge due to:_  []  Other:_      Emily Rausch 6/8/2018  5:40 PM    No future appointments.

## 2018-08-02 NOTE — PROGRESS NOTES
In [facility]as[facility] Eugenia Hernandez [address], [address] [place], [address] [place] [address],[facility] 300 
[phone] fax Discharge Summary Patient name: Kim Soares     Start of Care: 18 Referral source: Nayla Venegas,*    : 1980 Medical/Treatment Diagnosis: Pain in left shoulder [M25.512]  Onset Date:17 Prior Hospitalization: see medical history   Provider#: [id number] Comorbidities: none Prior Level of Function:  I all areas of ADLs and activities, no AD, household chores, work demands, community activities 
  Medications: Verified on Patient Summary List 
 
Visits from Start of Care: 5    Missed Visits:2 Reporting Period : 18 to 18 Summary of Care:patient seen for 5 sessions with residual pain 0. She had improved FOTO to 71. She has exercises for her HEP and should F/U with her MD as needed. Thank you. Goal:patient will have established and be I with HEP to aid with progression of skilled PT program 
        
 
Status at last note/certification:eval 
Status at discharge: met Goal:patient will have FOTO improved to 65 to show increase tolerance to her heavy household chores Status at last note/certification:eval 
Status at discharge: met Goal:patient will have FOTO improved to 71 to show increase tolerance to her heavy household chores Status at last note/certification:eval 
Status at discharge: met Goal: patient will report overall 50% improvement with regard to use of left UE with her work and household chores with less flare ups Status at last note/certification:eval 
Status at discharge: met ASSESSMENT/RECOMMENDATIONS: 
[]Discontinue therapy progressing towards or have reached established goals []Discontinue therapy due to lack of appreciable progress towards goals [x]Discontinue therapy due to lack of attendance or compliance [x]Other:No further contact Thank you for this referral.  
 
Andree Ashton Talya Wisdom, PT 8/2/2018 9:21 AM

## 2018-10-15 ENCOUNTER — HOSPITAL ENCOUNTER (OUTPATIENT)
Dept: GENERAL RADIOLOGY | Age: 38
Discharge: HOME | End: 2018-10-15
Payer: COMMERCIAL

## 2018-10-15 DIAGNOSIS — M54.50 LOW BACK PAIN: ICD-10-CM

## 2018-10-15 PROCEDURE — 72100 X-RAY EXAM L-S SPINE 2/3 VWS: CPT

## 2018-11-20 NOTE — DISCHARGE INSTRUCTIONS
Chest Pain: Care Instructions  Your Care Instructions  There are many things that can cause chest pain. Some are not serious and will get better on their own in a few days. But some kinds of chest pain need more testing and treatment. Your doctor may have recommended a follow-up visit in the next 8 to 12 hours. If you are not getting better, you may need more tests or treatment. Even though your doctor has released you, you still need to watch for any problems. The doctor carefully checked you, but sometimes problems can develop later. If you have new symptoms or if your symptoms do not get better, get medical care right away. If you have worse or different chest pain or pressure that lasts more than 5 minutes or you passed out (lost consciousness), call 911 or seek other emergency help right away. A medical visit is only one step in your treatment. Even if you feel better, you still need to do what your doctor recommends, such as going to all suggested follow-up appointments and taking medicines exactly as directed. This will help you recover and help prevent future problems. How can you care for yourself at home? · Rest until you feel better. · Take your medicine exactly as prescribed. Call your doctor if you think you are having a problem with your medicine. · Do not drive after taking a prescription pain medicine. When should you call for help? Call 911 if:  · You passed out (lost consciousness). · You have severe difficulty breathing. · You have symptoms of a heart attack. These may include:  ¨ Chest pain or pressure, or a strange feeling in your chest.  ¨ Sweating. ¨ Shortness of breath. ¨ Nausea or vomiting. ¨ Pain, pressure, or a strange feeling in your back, neck, jaw, or upper belly or in one or both shoulders or arms. ¨ Lightheadedness or sudden weakness. ¨ A fast or irregular heartbeat.   After you call 911, the  may tell you to chew 1 adult-strength or 2 to 4 low-dose aspirin. Wait for an ambulance. Do not try to drive yourself. Call your doctor today if:  · You have any trouble breathing. · Your chest pain gets worse. · You are dizzy or lightheaded, or you feel like you may faint. · You are not getting better as expected. · You are having new or different chest pain. Where can you learn more? Go to http://isadora-cindy.info/. Enter A120 in the search box to learn more about \"Chest Pain: Care Instructions. \"  Current as of: March 20, 2017  Content Version: 11.3  © 4277-9000 [employer]. Care instructions adapted under license by [employer] (which disclaims liability or warranty for this information). If you have questions about a medical condition or this instruction, always ask your healthcare professional. [address] any warranty or liability for your use of this information. ambulatory

## 2018-12-14 ENCOUNTER — OFFICE VISIT (OUTPATIENT)
Dept: ORTHOPEDIC SURGERY | Facility: CLINIC | Age: 38
End: 2018-12-14

## 2018-12-14 VITALS
WEIGHT: 200 LBS | HEIGHT: 66 IN | TEMPERATURE: 97.9 F | DIASTOLIC BLOOD PRESSURE: 87 MMHG | SYSTOLIC BLOOD PRESSURE: 133 MMHG | HEART RATE: 79 BPM | OXYGEN SATURATION: 99 % | RESPIRATION RATE: 16 BRPM | BODY MASS INDEX: 32.14 KG/M2

## 2018-12-14 DIAGNOSIS — S39.012A BACK STRAIN, INITIAL ENCOUNTER: Primary | ICD-10-CM

## 2018-12-14 DIAGNOSIS — G89.29 CHRONIC MIDLINE LOW BACK PAIN WITHOUT SCIATICA: ICD-10-CM

## 2018-12-14 DIAGNOSIS — M54.50 CHRONIC MIDLINE LOW BACK PAIN WITHOUT SCIATICA: ICD-10-CM

## 2018-12-14 RX ORDER — IBUPROFEN 200 MG
TABLET ORAL
COMMUNITY
End: 2018-12-26 | Stop reason: ALTCHOICE

## 2018-12-14 NOTE — PROGRESS NOTES
Patient: Kiersten Balbuena                MRN: [id number]       SSN: [id number]  YOB: 1980        AGE: 45 y.o. SEX: female  Body mass index is 32.28 kg/m². PCP: Pérez Dhillon MD  12/14/18    Chief Complaint: Low back pain    HISTORY OF PRESENT ILLNESS:   Mika Chaparro is a 45year-old female who comes in the office today with low back pain. The low back pain began when she was at work in September of this year. She went to lift a box and felt a sharp pain across her back. She has had pain ever since. She rates it as 2/10 on a pain scale today. She has pain when she attempts to lift things. She has been on light duty restrictions at work. It hurts along her spine. She denies any symptoms down her legs. She has tried Motrin, as well as a heating pad. She has not had an MRI. She did have x-rays. No physical therapy. Past Medical History:   Diagnosis Date    Diabetes ([address]     Pt. states pre-diabetic    Female bladder prolapse     Thyroid disease     Pt. states they are watching her levels.  Unspecified urinary incontinence        Family History   Problem Relation Age of Onset    Hypertension Father     Hypertension Paternal Grandfather        Current Outpatient Medications   Medication Sig Dispense Refill    ibuprofen (MOTRIN IB) 200 mg tablet Take  by mouth.  cyclobenzaprine (FLEXERIL) 10 mg tablet Take 1 Tab by mouth three (3) times daily as needed for Muscle Spasm(s). 14 Tab 0    dicyclomine (BENTYL) 20 mg tablet Take 20 mg by mouth every six (6) hours as needed.  BUDESONIDE PO Take 3 mg by mouth three (3) times daily.  MESALAMINE (PENTASA PO) Take  by mouth two (2) times a day.          No Known Allergies    Past Surgical History:   Procedure Laterality Date    COLONOSCOPY N/A 9/1/2017    COLONOSCOPY, DIAGNOSTIC /c Bx performed by Sarah Martin MD at [facility]      Pt with 2 previous abortions, and multiple surgeries to release adhesions       Social History     Socioeconomic History    Marital status: SINGLE     Spouse name: Not on file    Number of children: Not on file    Years of education: Not on file    Highest education level: Not on file   Social Needs    Financial resource strain: Not on file    Food insecurity - worry: Not on file    Food insecurity - inability: Not on file    Transportation needs - medical: Not on file   [employer] needs - non-medical: Not on file   Occupational History    Not on file   Tobacco Use    Smoking status: Never Smoker    Smokeless tobacco: Never Used   Substance and Sexual Activity    Alcohol use: Yes     Comment: occassionally    Drug use: No    Sexual activity: Not Currently   Other Topics Concern    Not on file   Social History Narrative    Not on file       REVIEW OF SYSTEMS:      CON: negative for recent weight loss/gain, fever, or chills  EYE: negative for double or blurry vision  ENT: negative for hoarseness  RS:   negative for cough, URI, SOB  CV:  negative for chest pain, palpitations  GI:    negative for blood in stool, nausea/vomiting  :  negative for blood in urine  MS: As per HPI  Other systems reviewed and noted below. PHYSICAL EXAMINATION:  Visit Vitals  /87 (BP 1 Location: Left arm, BP Patient Position: Sitting)   Pulse 79   Temp 97.9 °F (36.6 °C) (Oral)   Resp 16   Ht 5' 6\" (1.676 m)   Wt 200 lb (90.7 kg)   LMP 11/08/2018 (Exact Date)   SpO2 99%   BMI 32.28 kg/m²     Body mass index is 32.28 kg/m². GENERAL: Alert and oriented x3, in no acute distress, well-developed, well-nourished. HEENT: Normocephalic, atraumatic. RESP: Non labored breathing with equal chest rise on inspiration. CV: Well perfused extremities. No cyanosis or clubbing noted. ABDOMEN: Soft, non-tender, non-distended.    PHYSICAL EXAMINATION:   Physical exam of the lumbar spine, other than some tenderness to palpation along the paraspinal musculature and the lower lumbar spine, she has a relatively benign low back examination. She does have some decreased forward flexion and extension, as well as lateral rotation due to pain. Negative straight leg-raise bilaterally. No focal neurological deficits distally. 5/5 strength and normal sensation from L2-S1. No midline tenderness to palpation or step-off. IMAGING:   X-rays of the lumbar spine were previously taken and reviewed in the office today. They do not show any acute or chronic bony abnormalities. ASSESSMENT/PLAN:   Arnel Stratton is a 45year-old female with a work related low back injury. I have recommended an MRI, as well as physical therapy. I ordered this today. I will plan to see her back following the MRI. I have continued her 15 pound work restrictions at this time. Over 40 minutes spent with this patient today with over half face to face with patient.       Electronically signed by: Antonio Colbert MD

## 2018-12-26 ENCOUNTER — OFFICE VISIT (OUTPATIENT)
Dept: FAMILY MEDICINE CLINIC | Facility: CLINIC | Age: 38
End: 2018-12-26

## 2018-12-26 VITALS
WEIGHT: 197.4 LBS | SYSTOLIC BLOOD PRESSURE: 147 MMHG | RESPIRATION RATE: 16 BRPM | DIASTOLIC BLOOD PRESSURE: 86 MMHG | OXYGEN SATURATION: 98 % | BODY MASS INDEX: 31.72 KG/M2 | HEIGHT: 66 IN | TEMPERATURE: 98.4 F | HEART RATE: 61 BPM

## 2018-12-26 DIAGNOSIS — R07.2 PRECORDIAL PAIN: Primary | ICD-10-CM

## 2018-12-26 RX ORDER — MELOXICAM 7.5 MG/1
7.5 TABLET ORAL DAILY
Qty: 20 TAB | Refills: 2 | Status: SHIPPED | OUTPATIENT
Start: 2018-12-26 | End: 2019-04-19

## 2018-12-26 NOTE — PROGRESS NOTES
HISTORY OF PRESENT ILLNESS  Sheila Denson is a 45 y.o. female. She complains of chest pain radiating down the left arm. Present one week. The patient is not exerting herself at the onset. It is described as constant with no resolution. It is better supine. No fevers or chills. No new medications. No history of GERD , lung or heart disease. She is a non smoker. No muscular strain or chest strain is admitted to. She has had no recent travel. Her children are 17, 15 and 7. She does not lift them    She is presently at home post injuring her back at work. Establish Care   Associated symptoms include chest pain. Review of Systems   Constitutional: Negative for chills, diaphoresis, fever and weight loss. The patient denies any fever, chills, night sweats or weight loss  There has been no diaphoresis malaise or weakness   Eyes: Negative for discharge and redness. There is no history of blurred vision, double vision or blindness. The patient does not admit to any foreign body sensation or eye pain. There are no halos around lights  The patient has noted no jaundice   Respiratory: Negative for sputum production. The patient denies any shortness of breath at rest  There is no dyspnea on exertion  The patient denies any cough, or wheezing   Cardiovascular: Positive for chest pain. Negative for palpitations and PND. Gastrointestinal: Negative for blood in stool, melena and nausea. There is no history of nausea  The patient denies vomiting  There is no history of diarrhea or constipation  The patient denies heartburn     Skin: Negative for itching. The patient denies noticing any new skin lesions, rashes or ulcers. No skin color changes are admitted to   Neurological: Negative for dizziness, speech change and seizures.         The patient denies numbness, tingling or other changes in sensation  There is no history of focal weakness, paresis or paralysis  There is no history of LOC    Psychiatric/Behavioral: Negative for depression, hallucinations, memory loss, substance abuse and suicidal ideas. The patient is not nervous/anxious and does not have insomnia. Active Ambulatory Problems     Diagnosis Date Noted    Cellulitis 2013    Anemia 2015    Chest pain 10/06/2017     Resolved Ambulatory Problems     Diagnosis Date Noted    No Resolved Ambulatory Problems     Past Medical History:   Diagnosis Date    Diabetes ([address]     Female bladder prolapse     Thyroid disease     Unspecified urinary incontinence        family history includes Hypertension in her brother, father, mother, and paternal grandfather. FH diabetes, thyroid dad  age 61 of heart failure. No sudden death or heart attacks. reports that  has never smoked. she has never used smokeless tobacco. She reports that she drinks alcohol. She reports that she does not use drugs. No results found for any visits on 18. Arlyn VARGASMaria Eugenia Lennoxmolina Maurice had no medications administered during this visit. Vitals:    18 1407   BP: 147/86   Pulse: 61   Resp: 16   Temp: 98.4 °F (36.9 °C)   TempSrc: Oral   SpO2: 98%   Weight: 197 lb 6.4 oz (89.5 kg)   Height: 5' 6\" (1.676 m)                   Physical Exam   Constitutional: She is oriented to person, place, and time. She appears well-developed and well-nourished. HENT:   Head: Normocephalic and atraumatic. Right Ear: External ear normal.   Left Ear: External ear normal.   Nose: Nose normal.   Mouth/Throat: Oropharynx is clear and moist.   Eyes: Conjunctivae and EOM are normal. Pupils are equal, round, and reactive to light. Neck: Normal range of motion. Neck supple. No JVD present. No tracheal deviation present. No thyromegaly present. Cardiovascular: Regular rhythm, normal heart sounds and intact distal pulses. Pulmonary/Chest: Effort normal and breath sounds normal. No stridor. No respiratory distress. She has no wheezes.  She has no rales. She exhibits tenderness. Pressing on the left side of the chest reproduces the pain   Abdominal: Soft. Bowel sounds are normal. She exhibits no distension and no mass. There is no tenderness. There is no rebound and no guarding. Musculoskeletal: Normal range of motion. She exhibits no edema. Lymphadenopathy:     She has no cervical adenopathy. Neurological: She is alert and oriented to person, place, and time. Skin: Skin is warm and dry. No rash noted. No erythema. Psychiatric: She has a normal mood and affect. Her behavior is normal. Judgment normal.   Nursing note and vitals reviewed. MDM  Number of Diagnoses or Management Options  Precordial pain: new, needed workup  Diagnosis management comments:   Chest pain, ddx Cardiac, pulmonary, GI, musculoskeletal or other etiologies. Workup initiated to includeEKG and trial of medications  EKG: normal EKG, normal sinus rhythm, unchanged from previous tracings. ASSESSMENT and PLAN    ICD-10-CM ICD-9-CM    1. Precordial painAcute R07.2 786.51 AMB POC EKG ROUTINE W/ 12 LEADS, INTER & REP      meloxicam (MOBIC) 7.5 mg tablet    Trial of Mobic  EKG reassuring  Follow up one month     Follow-up Disposition:  Return in about 1 month (around 1/26/2019).

## 2018-12-26 NOTE — PATIENT INSTRUCTIONS
Musculoskeletal Chest Pain: Care Instructions  Your Care Instructions    Chest pain is not always a sign that something is wrong with your heart or that you have another serious problem. The doctor thinks your chest pain is caused by strained muscles or ligaments, inflamed chest cartilage, or another problem in your chest, rather than by your heart. You may need more tests to find the cause of your chest pain. Follow-up care is a key part of your treatment and safety. Be sure to make and go to all appointments, and call your doctor if you are having problems. It's also a good idea to know your test results and keep a list of the medicines you take. How can you care for yourself at home? · Take pain medicines exactly as directed. ? If the doctor gave you a prescription medicine for pain, take it as prescribed. ? If you are not taking a prescription pain medicine, ask your doctor if you can take an over-the-counter medicine. · Rest and protect the sore area. · Stop, change, or take a break from any activity that may be causing your pain or soreness. · Put ice or a cold pack on the sore area for 10 to 20 minutes at a time. Try to do this every 1 to 2 hours for the next 3 days (when you are awake) or until the swelling goes down. Put a thin cloth between the ice and your skin. · After 2 or 3 days, apply a heating pad set on low or a warm cloth to the area that hurts. Some doctors suggest that you go back and forth between hot and cold. · Do not wrap or tape your ribs for support. This may cause you to take smaller breaths, which could increase your risk of lung problems. · Mentholated creams such as Bengay or Icy Hot may soothe sore muscles. Follow the instructions on the package. · Follow your doctor's instructions for exercising. · Gentle stretching and massage may help you get better faster. Stretch slowly to the point just before pain begins, and hold the stretch for at least 15 to 30 seconds.  Do this 3 or 4 times a day. Stretch just after you have applied heat. · As your pain gets better, slowly return to your normal activities. Any increased pain may be a sign that you need to rest a while longer. When should you call for help? Call 911 anytime you think you may need emergency care. For example, call if:    · You have chest pain or pressure. This may occur with:  ? Sweating. ? Shortness of breath. ? Nausea or vomiting. ? Pain that spreads from the chest to the neck, jaw, or one or both shoulders or arms. ? Dizziness or lightheadedness. ? A fast or uneven pulse. After calling 911, chew 1 adult-strength aspirin. Wait for an ambulance. Do not try to drive yourself.     · You have sudden chest pain and shortness of breath, or you cough up blood.    Call your doctor now or seek immediate medical care if:    · You have any trouble breathing.     · Your chest pain gets worse.     · Your chest pain occurs consistently with exercise and is relieved by rest.    Watch closely for changes in your health, and be sure to contact your doctor if:    · Your chest pain does not get better after 1 week. Where can you learn more? Go to http://isadora-cindy.info/. Enter V293 in the search box to learn more about \"Musculoskeletal Chest Pain: Care Instructions. \"  Current as of: November 20, 2017  Content Version: 11.8  © 0273-8327 [employer]. Care instructions adapted under license by [employer] (which disclaims liability or warranty for this information). If you have questions about a medical condition or this instruction, always ask your healthcare professional. [place] [address] any warranty or liability for your use of this information.

## 2018-12-26 NOTE — PROGRESS NOTES
HISTORY OF PRESENT ILLNESS  Nicole Jessica is a 45 y.o. female.   HPI    ROS    Physical Exam    ASSESSMENT and PLAN  {ASSESSMENT/PLAN:41119}

## 2018-12-31 ENCOUNTER — HOSPITAL ENCOUNTER (OUTPATIENT)
Age: 38
Discharge: HOME | End: 2018-12-31
Attending: ORTHOPAEDIC SURGERY

## 2018-12-31 DIAGNOSIS — M54.50 CHRONIC MIDLINE LOW BACK PAIN WITHOUT SCIATICA: ICD-10-CM

## 2018-12-31 DIAGNOSIS — G89.29 CHRONIC MIDLINE LOW BACK PAIN WITHOUT SCIATICA: ICD-10-CM

## 2019-01-02 ENCOUNTER — TELEPHONE (OUTPATIENT)
Dept: ORTHOPEDIC SURGERY | Facility: CLINIC | Age: 39
End: 2019-01-02

## 2019-01-02 DIAGNOSIS — F40.240 CLAUSTROPHOBIA: Primary | ICD-10-CM

## 2019-01-02 RX ORDER — LORAZEPAM 1 MG/1
TABLET ORAL
Qty: 3 TAB | Refills: 0 | Status: SHIPPED | OUTPATIENT
Start: 2019-01-02 | End: 2019-04-19

## 2019-01-02 NOTE — TELEPHONE ENCOUNTER
Patient called to request medication for MRI - she was unable to have it done today due to claustrophobia. Patient prefers [facility] on [address] if can be called in.   Please review and advise patient at [phone]

## 2019-01-02 NOTE — TELEPHONE ENCOUNTER
[address] and phoned in medication at this time. Contacted pt and informed her that prescription will be called in to [facility] on [address].

## 2019-01-11 ENCOUNTER — OFFICE VISIT (OUTPATIENT)
Dept: UROLOGY | Age: 39
End: 2019-01-11

## 2019-01-11 VITALS
SYSTOLIC BLOOD PRESSURE: 129 MMHG | HEART RATE: 59 BPM | HEIGHT: 66 IN | BODY MASS INDEX: 31.66 KG/M2 | OXYGEN SATURATION: 99 % | DIASTOLIC BLOOD PRESSURE: 83 MMHG | WEIGHT: 197 LBS

## 2019-01-11 DIAGNOSIS — N81.2 INCOMPLETE UTEROVAGINAL PROLAPSE: ICD-10-CM

## 2019-01-11 DIAGNOSIS — R35.1 NOCTURIA: ICD-10-CM

## 2019-01-11 DIAGNOSIS — R35.0 FREQUENT URINATION: Primary | ICD-10-CM

## 2019-01-11 LAB
BILIRUB UR QL STRIP: NEGATIVE
GLUCOSE UR-MCNC: NEGATIVE MG/DL
KETONES P FAST UR STRIP-MCNC: NEGATIVE MG/DL
PH UR STRIP: 5.5 [PH] (ref 4.6–8)
PROT UR QL STRIP: NEGATIVE
SP GR UR STRIP: 1.02 (ref 1–1.03)
UA UROBILINOGEN AMB POC: NORMAL (ref 0.2–1)
URINALYSIS CLARITY POC: CLEAR
URINALYSIS COLOR POC: YELLOW
URINE BLOOD POC: NEGATIVE
URINE LEUKOCYTES POC: NORMAL
URINE NITRITES POC: NEGATIVE

## 2019-01-11 RX ORDER — IBUPROFEN 600 MG/1
TABLET ORAL
COMMUNITY
End: 2019-04-19

## 2019-01-11 NOTE — PATIENT INSTRUCTIONS
Urine Test: About This Test  What is it? A urine test checks the color, clarity (clear or cloudy), odor, concentration, and acidity (pH) of your urine. It also checks your levels of protein, sugar, blood cells, or other substances in your urine. This test is sometimes called a urinalysis. Why is this test done? A urine test may be done:  · To check for a disease or infection of the urinary tract. The urinary tract includes the kidneys, the tubes that carry urine from the kidneys to the bladder (ureters), and the bladder. It also includes the tube that carries urine from the bladder to outside the body (urethra). · To check the treatment of conditions such as diabetes, kidney stones, a urinary tract infection (UTI), high blood pressure, or some kidney or liver diseases. How can you prepare for the test?  · Before the test, don't eat foods that can change the color of your urine. Examples of these include blackberries, beets, and rhubarb. · Don't do heavy exercise before the test.  · Tell your doctor if you are menstruating or close to starting your period. Your doctor may want to wait to do the test.  · Tell your doctor about all the nonprescription and prescription medicines and herbs or other supplements you take. Some of these can affect the results of this test.  What happens during the test?  A urine test can be done in your doctor's office, clinic, or lab. Or you may be asked to collect a urine sample at home. Then you can take it to the office or lab for testing. Clean-catch midstream urine collection  · Wash your hands before you start. · If the cup you are given has a lid, remove it carefully. Set it down with the inner surface up. Don't touch the inside of the cup with your fingers. · Clean the area around your genitals. ? For men: Pull back the foreskin, if present. Clean the head of your penis with medicated towelettes or swabs. ?  For women: Spread open the genital folds of skin with one hand. Then use medicated towelettes or swabs in your other hand to clean the area where urine comes out (the urethra). Wipe the area from front to back. · Start urinating into the toilet or urinal. A woman should hold apart the genital folds of skin while she urinates. · After the urine has flowed for several seconds, place the cup into the urine stream. Collect about 2 ounces of urine without stopping your flow of urine. · Don't touch the rim of the cup to your genital area. Don't get toilet paper, pubic hair, stool (feces), menstrual blood, or anything else in the urine sample. · Finish urinating into the toilet or urinal.  · Carefully replace and tighten the lid on the cup, and then return it to the lab. If you are collecting the urine at home and can't get it to the lab in an hour, refrigerate it. Double-voided urine sample collection  This method collects the urine your body is making right now. · Urinate into the toilet or urinal. Don't collect any of this urine. · Drink a large glass of water, and wait about 30 to 40 minutes. · Then get a urine sample. Follow the instructions above for collecting a clean-catch urine sample. · Take the urine sample to the lab. If you are collecting the urine at home and can't get it to the lab in an hour, refrigerate it. Follow-up care is a key part of your treatment and safety. Be sure to make and go to all appointments, and call your doctor if you are having problems. It's also a good idea to keep a list of the medicines you take. Ask your doctor when you can expect to have your test results. Where can you learn more? Go to http://isadora-cindy.info/. Enter R266 in the search box to learn more about \"Urine Test: About This Test.\"  Current as of: June 26, 2018  Content Version: 11.8  © 5009-4119 [employer].  Care instructions adapted under license by [employer] (which disclaims liability or warranty for this information). If you have questions about a medical condition or this instruction, always ask your healthcare professional. [place] [address] any warranty or liability for your use of this information.

## 2019-01-11 NOTE — PROGRESS NOTES
Ms. Emma Gracia has a reminder for a \"due or due soon\" health maintenance. I have asked that she contact her primary care provider for follow-up on this health maintenance.

## 2019-01-12 NOTE — PROGRESS NOTES
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 I have reviewed her CT scan because she told me that they thought there was perhaps an obstruction to one kidney but I see no evidence of that nor do I read that in the radiological report. She has had 3 children, no complications, and all delivered by vaginal delivery. She has no stress urinary incontinence still has normal menses. Past Medical History:   Diagnosis Date    Diabetes ([address]     Pt. states pre-diabetic    Female bladder prolapse     Thyroid disease     Pt. states they are watching her levels.     Unspecified urinary incontinence        Past Surgical History:   Procedure Laterality Date    COLONOSCOPY N/A 9/1/2017    COLONOSCOPY, DIAGNOSTIC /c Bx performed by Mamie Vasquez MD at [facility]      Pt with 2 previous abortions, and multiple surgeries to release adhesions       Social History     Tobacco Use    Smoking status: Never Smoker    Smokeless tobacco: Never Used   Substance Use Topics    Alcohol use: Yes     Comment: occassionally    Drug use: No       No Known Allergies    Family History   Problem Relation Age of Onset    Hypertension Father     Heart Disease Father     Hypertension Paternal Grandfather     Hypertension Mother     Hypertension Brother     Cancer Neg Hx     Diabetes Neg Hx        Current Outpatient Medications   Medication Sig Dispense Refill    ibuprofen (MOTRIN) 600 mg tablet ibuprofen 600 mg tablet   TAKE 1 TABLET BY MOUTH EVERY 8 HOURS      LORazepam (ATIVAN) 1 mg tablet 1-2 tab PO 20 mins Prior to procedure 3 Tab 0    meloxicam (MOBIC) 7.5 mg tablet Take 1 Tab by mouth daily. 20 Tab 2    cyclobenzaprine (FLEXERIL) 10 mg tablet Take 1 Tab by mouth three (3) times daily as needed for Muscle Spasm(s). 14 Tab 0    dicyclomine (BENTYL) 20 mg tablet Take 20 mg by mouth every six (6) hours as needed.  BUDESONIDE PO Take 3 mg by mouth three (3) times daily.  MESALAMINE (PENTASA PO) Take  by mouth two (2) times a day. REVIEW OF SYSTEMS:   GYN system review by the chart reveals a history of lysis of GYN and pelvic adhesions and other laparoscopies. PHYSICAL EXAMINATION:     Visit Vitals  /83 (BP 1 Location: Left arm, BP Patient Position: Sitting)   Pulse (!) 59 Comment: reported to dr Sue [place]   Ht 5' 6\" (1.676 m)   Wt 197 lb (89.4 kg)   SpO2 99%   BMI 31.80 kg/m²     Constitutional: Well developed, well-nourished female in no acute distress. CV:  No peripheral swelling noted  Respiratory: No respiratory distress or difficulties  Abdomen:  Soft and nontender. No masses. No hepatosplenomegaly.  Female: BSU and external genitalia reveal no obvious abnormalities. She does have a moderate vaginal discharge. On Valsalva maneuver, the bladder protrudes down to the introitus but not really outside. The cervix is just barely inside the vagina on that maneuver and is clearly multiparous. I cannot really appreciate the top of the uterus because of her size. Skin:  Normal color. No evidence of jaundice. Neuro/Psych:  Patient with appropriate affect. Alert and oriented. Lymphatic:   No enlargement of supraclavicular lymph nodes.       Results for orders placed or performed in visit on 01/11/19   AMB POC URINALYSIS DIP STICK AUTO W/O MICRO   Result Value Ref Range    Color (UA POC) Yellow     Clarity (UA POC) Clear     Glucose (UA POC) Negative Negative    Bilirubin (UA POC) Negative Negative    Ketones (UA POC) Negative Negative    Specific gravity (UA POC) 1.025 1.001 - 1.035    Blood (UA POC) Negative Negative    pH (UA POC) 5.5 4.6 - 8.0    Protein (UA POC) Negative Negative    Urobilinogen (UA POC) 0.2 mg/dL 0.2 - 1    Nitrites (UA POC) Negative Negative    Leukocyte esterase (UA POC) Trace Negative         REVIEW OF LABS AND IMAGING:      Imaging Report Reviewed? YES      Images Reviewed? YES           Other Lab Data Reviewed? YES    ASSESSMENT:     ICD-10-CM ICD-9-CM    1. Frequent urination R35.0 788.41 AMB POC URINALYSIS DIP STICK AUTO W/O MICRO   2. Nocturia R35.1 788.43 AMB POC URINALYSIS DIP STICK AUTO W/O MICRO   3. Incomplete uterovaginal prolapse N81.2 618.2                 PLAN/DISCUSSION: I have reviewed the CT scan from late 2017 and at that time she had a fairly large uterus but the bladder did not appear to descend as far down as it does today. In my opinion, another pregnancy is simply going to make her bladder protrude probably outside the vagina and I think once that occurs she is probably going to require surgical repair which would undoubtedly include a hysterectomy. I will leave the pregnancy issue up to the patient and if she so chooses up to her gynecologist.  At this point, since she is not having any evidence of upper tract abnormalities such as obstruction or stones and the fact that she does not have any urinary incontinence, I will simply follow her unless asked to see her again. Patient voices understanding and agreement to the plan. Taylor Saleem MD on 1/11/2019           Please note: This document has been produced using voice recognition software. Unrecognized errors in transcription may be present.

## 2019-01-18 ENCOUNTER — HOSPITAL ENCOUNTER (OUTPATIENT)
Dept: MRI IMAGING | Age: 39
Discharge: HOME | End: 2019-01-18
Attending: ORTHOPAEDIC SURGERY
Payer: MEDICAID

## 2019-01-18 PROCEDURE — 72148 MRI LUMBAR SPINE W/O DYE: CPT

## 2019-01-22 ENCOUNTER — DOCUMENTATION ONLY (OUTPATIENT)
Dept: ORTHOPEDIC SURGERY | Facility: CLINIC | Age: 39
End: 2019-01-22

## 2019-01-22 ENCOUNTER — OFFICE VISIT (OUTPATIENT)
Dept: ORTHOPEDIC SURGERY | Facility: CLINIC | Age: 39
End: 2019-01-22

## 2019-01-22 VITALS
RESPIRATION RATE: 16 BRPM | TEMPERATURE: 98.2 F | OXYGEN SATURATION: 98 % | DIASTOLIC BLOOD PRESSURE: 92 MMHG | SYSTOLIC BLOOD PRESSURE: 129 MMHG | HEIGHT: 66 IN | WEIGHT: 200 LBS | HEART RATE: 60 BPM | BODY MASS INDEX: 32.14 KG/M2

## 2019-01-22 DIAGNOSIS — S39.012A BACK STRAIN, INITIAL ENCOUNTER: Primary | ICD-10-CM

## 2019-01-22 NOTE — PROGRESS NOTES
Patient dropped off [address], 1 page at the [facility] location for completion.   Please contact the patient when ready    Pt I#[phone]

## 2019-01-22 NOTE — LETTER
NOTIFICATION RETURN TO WORK / SCHOOL 
 
1/22/2019 2:05 PM 
 
Ms. Sivakumar Pendleton [address] To Whom It May Concern: 
 
Sivakumar Pendleton is currently under the care of [address] [place] [address]. She will return to work/school on: 1/23/19 with the following restrictions Light duty only No lifting greater than 10 pounds No bending or squatting Sedentary work ok These restrictions are in place for 3 months If there are questions or concerns please have the patient contact our office.  
 
 
 
Sincerely, 
 
 
Winston Thomas MD

## 2019-01-22 NOTE — PROGRESS NOTES
Patient: Janie Johnston                MRN: [id number]       SSN: [id number]  YOB: 1980        AGE: 45 y.o. SEX: female  Body mass index is 32.28 kg/m². PCP: Loreto Gatica MD  01/22/19    Chief Complaint: Low back pain     HISTORY OF PRESENT ILLNESS:   Terrell Ladd returns to the office today for her back MRI review. She continues to have low back pain. She underwent the MRI. She has low back pain that does not radiate into her legs. No significant changes. Past Medical History:   Diagnosis Date    Diabetes ([address]     Pt. states pre-diabetic    Female bladder prolapse     Thyroid disease     Pt. states they are watching her levels.  Unspecified urinary incontinence        Family History   Problem Relation Age of Onset    Hypertension Father     Heart Disease Father     Hypertension Paternal Grandfather     Hypertension Mother     Hypertension Brother     Cancer Neg Hx     Diabetes Neg Hx        Current Outpatient Medications   Medication Sig Dispense Refill    ibuprofen (MOTRIN) 600 mg tablet ibuprofen 600 mg tablet   TAKE 1 TABLET BY MOUTH EVERY 8 HOURS      meloxicam (MOBIC) 7.5 mg tablet Take 1 Tab by mouth daily. 20 Tab 2    cyclobenzaprine (FLEXERIL) 10 mg tablet Take 1 Tab by mouth three (3) times daily as needed for Muscle Spasm(s). 14 Tab 0    dicyclomine (BENTYL) 20 mg tablet Take 20 mg by mouth every six (6) hours as needed.  BUDESONIDE PO Take 3 mg by mouth three (3) times daily.  MESALAMINE (PENTASA PO) Take  by mouth two (2) times a day.       LORazepam (ATIVAN) 1 mg tablet 1-2 tab PO 20 mins Prior to procedure 3 Tab 0       No Known Allergies    Past Surgical History:   Procedure Laterality Date    COLONOSCOPY N/A 9/1/2017    COLONOSCOPY, DIAGNOSTIC /c Bx performed by Avery Camacho MD at [facility] 76      Pt with 2 previous abortions, and multiple surgeries to release adhesions Social History     Socioeconomic History    Marital status:      Spouse name: Not on file    Number of children: Not on file    Years of education: Not on file    Highest education level: Not on file   Social Needs    Financial resource strain: Not on file    Food insecurity - worry: Not on file    Food insecurity - inability: Not on file    Transportation needs - medical: Not on file   [employer] needs - non-medical: Not on file   Occupational History    Not on file   Tobacco Use    Smoking status: Never Smoker    Smokeless tobacco: Never Used   Substance and Sexual Activity    Alcohol use: Yes     Comment: occassionally    Drug use: No    Sexual activity: Yes     Partners: Male   Other Topics Concern    Not on file   Social History Narrative    Not on file       REVIEW OF SYSTEMS:      No changes from previous review of systems unless noted. PHYSICAL EXAMINATION:  Visit Vitals  BP (!) 129/92 (BP 1 Location: Left arm, BP Patient Position: Sitting)   Pulse 60   Temp 98.2 °F (36.8 °C) (Oral)   Resp 16   Ht 5' 6\" (1.676 m)   Wt 200 lb (90.7 kg)   LMP 12/19/2018 (Exact Date)   SpO2 98%   BMI 32.28 kg/m²     Body mass index is 32.28 kg/m². GENERAL: Alert and oriented x3, in no acute distress. HEENT: Normocephalic, atraumatic. RESP: Non labored breathing. SKIN: No rashes or lesions noted. PHYSICAL EXAMINATION:   Physical exam of the lumbar spine is relatively unchanged. She has mild tenderness to palpation in the paraspinal musculature. Negative straight leg-raise bilaterally. Neurovascularly intact distally. MRI:    An MRI of the lumbar spine was reviewed. I personally reviewed these images. These images do not show any fracture or dislocations, arthritis or disk herniations or nerve impingement. ASSESSMENT/PLAN:    Emmanuelle Mejía is a 45year-old female with low back strain. Fortunately, her x-rays and MRI appear relatively clean.   She continues to have symptoms and I recommended physical therapy. I also recommended return to work with restrictions, which I gave to her today. I will plan to see her back in about two months to see how she responds to therapy.                 Electronically signed by: Juan Bill MD

## 2019-01-28 ENCOUNTER — APPOINTMENT (OUTPATIENT)
Dept: PHYSICAL THERAPY | Age: 39
End: 2019-01-28

## 2019-01-28 ENCOUNTER — DOCUMENTATION ONLY (OUTPATIENT)
Dept: ORTHOPEDIC SURGERY | Facility: CLINIC | Age: 39
End: 2019-01-28

## 2019-01-28 DIAGNOSIS — G89.29 CHRONIC BILATERAL LOW BACK PAIN WITHOUT SCIATICA: Primary | ICD-10-CM

## 2019-01-28 DIAGNOSIS — M54.50 CHRONIC BILATERAL LOW BACK PAIN WITHOUT SCIATICA: Primary | ICD-10-CM

## 2019-01-28 NOTE — PROGRESS NOTES
Call patient and notified her form is available for  at the [facility] location. A copy has also been placed to be scanned.

## 2019-02-05 ENCOUNTER — HOSPITAL ENCOUNTER (OUTPATIENT)
Dept: PHYSICAL THERAPY | Age: 39
Discharge: HOME | End: 2019-02-05
Payer: OTHER GOVERNMENT

## 2019-02-05 PROCEDURE — 97161 PT EVAL LOW COMPLEX 20 MIN: CPT

## 2019-02-05 PROCEDURE — 97110 THERAPEUTIC EXERCISES: CPT

## 2019-02-05 PROCEDURE — 97530 THERAPEUTIC ACTIVITIES: CPT

## 2019-02-05 PROCEDURE — 97014 ELECTRIC STIMULATION THERAPY: CPT

## 2019-02-05 NOTE — PROGRESS NOTES
PT DAILY TREATMENT NOTE 8-    Patient Name: Sandrine Alcantar  Date:2019  : 1980  [x]  Patient  Verified  Payor: DEPARTMENT OF LABOR / Plan: [address] [place] [address] Avenue / Product Type: Workers Comp /    In [employer] time:855  Total Treatment Time (min): 55  Visit #: 1 of 10    Treatment Area: Low back pain [M54.5]    SUBJECTIVE  Pain Level (0-10 scale): 2  Any medication changes, allergies to medications, adverse drug reactions, diagnosis change, or new procedure performed?: [x] No    [] Yes (see summary sheet for update)  Subjective functional status/changes:   [x] See Eval form in paper chart     OBJECTIVE      20 min [x]Eval                  []Re-Eval         15 min Therapeutic Exercise:  [x] See flow sheet :HEP   Rationale: increase ROM, increase strength, improve coordination, improve balance and increase proprioception to improve the patients ability to perform ADLs. 10 min Therapeutic Activity:  [x]  See flow sheet :review of lifting/ squatting mechanics, activity modifications for home enviroment   Rationale: increase ROM, increase strength, improve coordination, improve balance and increase proprioception  to prevent exacerbation of symptoms. Modality rationale: decrease pain and increase tissue extensibility to improve the patients ability to improve ease with daily mobility.    Min Type Additional Details   10 [x] Estim:  [x]Unatt       [x]IFC  []Premod                        []Other:  []w/ice   [x]w/heat  Position:right side lying with pillow btw knees  Location:low back    [] Estim: []Att    []TENS instruct  []NMES                    []Other:  []w/US   []w/ice   []w/heat  Position:  Location:    []  Traction: [] Cervical       []Lumbar                       [] Prone          []Supine                       []Intermittent   []Continuous Lbs:  [] before manual  [] after manual    []  Ultrasound: []Continuous   [] Pulsed                           []1MHz   []3MHz Location:  W/cm2:    []  Iontophoresis with dexamethasone         Location: [] Take home patch   [] In clinic    []  Ice     []  heat  []  Ice massage  []  Laser   []  Anodyne Position:  Location:    []  Laser with stim  []  Other: Position:  Location:    []  Vasopneumatic Device Pressure:       [] lo [] med [] hi   Temperature: [] lo [] med [] hi   [x] Skin assessment post-treatment:  [x]intact []redness- no adverse reaction    []redness - adverse reaction:             With   [] TE   [] TA   [] neuro   [] other: Patient Education: [x] Review HEP    [] Progressed/Changed HEP based on:   [] positioning   [] body mechanics   [] transfers   [] heat/ice application    [] other:           Pain Level (0-10 scale) post treatment: 0    ASSESSMENT:   [x]  See Evaluation          Goals:  Short Term Goals: To be accomplished in 1 weeks:  1. Establish HEP for ROM & Strengthening. Long Term Goals: To be accomplished in 10 treatments:  1. Patient will be independent with HEP for ROM & Strengthening. Eval Status:na  2. Pt will decrease average pain rating on VAS to <2/10 to improve ease with mobility/ADLs. Eval Status:2-8/10  3. Pt will increase FOTO score to 69 points to demonstrate increased ease with ADLs. Eval Status: FOTO: 53  4. Pt will demonstrate proper lifting/ squatting form without cuing from therapist to improve ease with household/work activities.     Eval Status:hyper flexes at trunk, limited LE involvement    PLAN      [x]  Continue plan of care           Peace Nunez PT 2/5/2019  9:13 AM

## 2019-02-05 NOTE — PROGRESS NOTES
[facility]  [address] [facility] [address], [address] [phone] fax    Plan of Care/ Statement of Necessity for Physical Therapy Services           Patient name: Laura Kulkarni Start of Care: 2019   Referral source: Bailey Traore MD : 1980    Medical Diagnosis: Low back pain [M54.5]  Payor: [address] [place] [address] / Plan: [address] [facility] / Product Type: Workers Comp /  Onset Date:2018    Treatment Diagnosis: LBP   Prior Hospitalization: see medical history Provider#: [id number]   Medications: Verified on Patient summary List    Comorbidities: thyroid   Prior Level of Function: works as [employer]. Has been out of work due to injury. Lives in 2nd level apartment. Active mother of 3    The Plan of Care and following information is based on the information from the initial evaluation. Assessment/ key information: Patient is a 45 y. o.female presenting with Low back pain [M54.5]. Ms. Nilson Osorio presents to initial PT evaluation with c/o worsening low back pain after lifting a heavy box at work on 18. Patient has since filed a claim with the DOL. She displays some right sided LE weakness, pain localized to right SIJ & right lumbar paraspinals, and limited lumbar mobility into both end range flexion & extension. Patient reports no radicular symptoms, and screen was negative for neural signs. Symptoms seem consistent with lumbar strain. We will work to address strength, ROM & flexiblity deficits, with emphasis on pain control and return to work activities. Patient will benefit from skilled PT services to address deficits and facilitate return to premorbid activity level and promote improved quality of life.        Evaluation Complexity History LOW Complexity : Zero comorbidities / personal factors that will impact the outcome / POC; Examination LOW Complexity : 1-2 Standardized tests and measures addressing body structure, function, activity limitation and / or participation in recreation  ;Presentation MEDIUM Complexity : Evolving with changing characteristics  ; Clinical Decision Making MEDIUM Complexity : FOTO score of 26-74  Overall Complexity Rating: LOW   Problem List: pain affecting function, decrease ROM, decrease strength, edema affecting function, impaired gait/ balance, decrease ADL/ functional abilitiies, decrease activity tolerance, decrease flexibility/ joint mobility and decrease transfer abilities   Treatment Plan may include any combination of the following: Therapeutic exercise, Therapeutic activities, Neuromuscular re-education, Physical agent/modality, Gait/balance training, Manual therapy, Aquatic therapy, Patient education, Self Care training, Functional mobility training, Home safety training and Stair training  Patient / Family readiness to learn indicated by: asking questions, trying to perform skills and interest  Persons(s) to be included in education: patient (P)  Barriers to Learning/Limitations: None  Patient Goal (s): strengthening my back and return to work at 100%  Patient Self Reported Health Status: excellent  Rehabilitation Potential: good  Short Term Goals: To be accomplished in 1 weeks:  1. Establish HEP for ROM & Strengthening. Long Term Goals: To be accomplished in 10 treatments:  1. Patient will be independent with HEP for ROM & Strengthening. Eval Status:na  2. Pt will decrease average pain rating on VAS to <2/10 to improve ease with mobility/ADLs. Eval Status:2-8/10  3. Pt will increase FOTO score to 69 points to demonstrate increased ease with ADLs. Eval Status: FOTO: 53  4. Pt will demonstrate proper lifting/ squatting form without cuing from therapist to improve ease with household/work activities. Eval Status:hyper flexes at trunk, limited LE involvement    Frequency / Duration: Patient to be seen 2 times per week for 10 treatments.     Patient/ Caregiver education and instruction: Diagnosis, prognosis, self care, activity modification and exercises   [x]  Plan of care has been reviewed with NORAH Saldaña, PT 2/5/2019 9:04 AM    ________________________________________________________________________    I certify that the above Therapy Services are being furnished while the patient is under my care. I agree with the treatment plan and certify that this therapy is necessary.     Physician's Signature:____________Date:_________TIME:________    ** Signature, Date and Time must be completed for valid certification **    Please sign and return to [facility]  [address], [address] [phone] fax

## 2019-02-15 ENCOUNTER — HOSPITAL ENCOUNTER (OUTPATIENT)
Dept: PHYSICAL THERAPY | Age: 39
Discharge: HOME | End: 2019-02-15
Payer: OTHER GOVERNMENT

## 2019-02-15 PROCEDURE — 97110 THERAPEUTIC EXERCISES: CPT

## 2019-02-15 PROCEDURE — 97112 NEUROMUSCULAR REEDUCATION: CPT

## 2019-02-15 NOTE — PROGRESS NOTES
PT DAILY TREATMENT NOTE 10-18    Patient Name: Nathaniel Calvo  Date:2/15/2019  : 1980  [x]  Patient  Verified  Payor: DEPARTMENT OF LABOR / Plan: [address] [place] [address] Avenue / Product Type: Workers Comp /    In [employer] time:525  Total Treatment Time (min): 25  Visit #: 2 of 10    Treatment Area: Low back pain [M54.5]    SUBJECTIVE  Pain Level (0-10 scale): 0  Any medication changes, allergies to medications, adverse drug reactions, diagnosis change, or new procedure performed?: [x] No    [] Yes (see summary sheet for update)  Subjective functional status/changes:   [] No changes reported  \"No pain today. \"    OBJECTIVE    Modality rationale: patient declined   Min Type Additional Details    [] Estim:  []Unatt       []IFC  []Premod                        []Other:  []w/ice   []w/heat  Position:  Location:    [] Estim: []Att    []TENS instruct  []NMES                    []Other:  []w/US   []w/ice   []w/heat  Position:  Location:    []  Traction: [] Cervical       []Lumbar                       [] Prone          []Supine                       []Intermittent   []Continuous Lbs:  [] before manual  [] after manual    []  Ultrasound: []Continuous   [] Pulsed                           []1MHz   []3MHz W/cm2:  Location:    []  Iontophoresis with dexamethasone         Location: [] Take home patch   [] In clinic    []  Ice     []  heat  []  Ice massage  []  Laser   []  Anodyne Position:  Location:    []  Laser with stim  []  Other:  Position:  Location:    []  Vasopneumatic Device Pressure:       [] lo [] med [] hi   Temperature: [] lo [] med [] hi   [] Skin assessment post-treatment:  []intact []redness- no adverse reaction    []redness - adverse reaction:     10 min Therapeutic Exercise:  [x] See flow sheet :   Rationale: increase ROM and increase strength to improve the patients ability to perform ADLs     15 min Neuromuscular Re-education:  [x]  See flow sheet : core stabilization activities Rationale: increase ROM, increase strength, improve coordination, improve balance and increase proprioception  to improve the patients ability to improve mobility and upright posture        With   [x] TE   [] TA   [x] neuro   [] other: Patient Education: [x] Review HEP    [] Progressed/Changed HEP based on:   [x] positioning   [x] body mechanics   [] transfers   [] heat/ice application    [] other:      Other Objective/Functional Measures:      Pain Level (0-10 scale) post treatment: 0    ASSESSMENT/Changes in Function: Initiated POC to which pt responded well. Pt was tight through lumbar paraspinals with quadruped activities. Needed cuing for flexed posture with standing exercises. Pain was well controlled today. Reports compliance with HEP. Patient will continue to benefit from skilled PT services to modify and progress therapeutic interventions, address functional mobility deficits, address ROM deficits, address strength deficits, analyze and address soft tissue restrictions, analyze and cue movement patterns, analyze and modify body mechanics/ergonomics, assess and modify postural abnormalities, address imbalance/dizziness and instruct in home and community integration to attain remaining goals. [x]  See Plan of Care  []  See progress note/recertification  []  See Discharge Summary         Progress towards goals / Updated goals:  Short Term Goals: To be accomplished in 1 weeks:  1. Establish HEP for ROM & Strengthening.    MET  Long Term Goals: To be accomplished in 10 treatments:  1. Patient will be independent with HEP for ROM & Strengthening. Eval Status:na    Reports initial compliance  2. Pt will decrease average pain rating on VAS to <2/10 to improve ease with mobility/ADLs. Eval Status:2-8/10    Pain was declined today  3. Pt will increase FOTO score to 69 points to demonstrate increased ease with ADLs.                 Eval Status: FOTO: 53    Assess at 30 day noé 4. Pt will demonstrate proper lifting/ squatting form without cuing from therapist to improve ease with household/work activities.                Eval Status:hyper flexes at trunk, limited LE involvement    Assess NV    PLAN  []  Upgrade activities as tolerated     [x]  Continue plan of care  []  Update interventions per flow sheet       []  Discharge due to:_  []  Other:_      Shari Gonzalez PTA, [facility] 2/15/2019  5:31 PM    Future Appointments   Date Time Provider Diogo Katz   2/19/2019 11:00 AM Adriana Joy PTA [facility]   2/21/2019 11:00 AM Adriana Joy PTA [facility]   3/22/2019  1:10 PM Jess Bonilla MD [facility] 69

## 2019-02-19 ENCOUNTER — HOSPITAL ENCOUNTER (OUTPATIENT)
Dept: PHYSICAL THERAPY | Age: 39
Discharge: HOME | End: 2019-02-19
Payer: OTHER GOVERNMENT

## 2019-02-19 PROCEDURE — 97110 THERAPEUTIC EXERCISES: CPT

## 2019-02-19 PROCEDURE — 97112 NEUROMUSCULAR REEDUCATION: CPT

## 2019-02-19 NOTE — PROGRESS NOTES
PT DAILY TREATMENT NOTE 10-18    Patient Name: Jose Schwarz  Date:2019  : 1980  [x]  Patient  Verified  Payor: DEPARTMENT OF LABOR / Plan: [address] [place] [address] Avenue / Product Type: Workers Comp /    In [address] [place] [address]  Total Treatment Time (min): 41  Visit #: 3 of 10    Treatment Area: Low back pain [M54.5]    SUBJECTIVE  Pain Level (0-10 scale): 3  Any medication changes, allergies to medications, adverse drug reactions, diagnosis change, or new procedure performed?: [x] No    [] Yes (see summary sheet for update)  Subjective functional status/changes:   [] No changes reported  \"I was sore after last time. \"    OBJECTIVE    Modality rationale: patient declined   Min Type Additional Details    [] Estim:  []Unatt       []IFC  []Premod                        []Other:  []w/ice   []w/heat  Position:  Location:    [] Estim: []Att    []TENS instruct  []NMES                    []Other:  []w/US   []w/ice   []w/heat  Position:  Location:    []  Traction: [] Cervical       []Lumbar                       [] Prone          []Supine                       []Intermittent   []Continuous Lbs:  [] before manual  [] after manual    []  Ultrasound: []Continuous   [] Pulsed                           []1MHz   []3MHz W/cm2:  Location:    []  Iontophoresis with dexamethasone         Location: [] Take home patch   [] In clinic    []  Ice     []  heat  []  Ice massage  []  Laser   []  Anodyne Position:  Location:    []  Laser with stim  []  Other:  Position:  Location:    []  Vasopneumatic Device Pressure:       [] lo [] med [] hi   Temperature: [] lo [] med [] hi   [] Skin assessment post-treatment:  []intact []redness- no adverse reaction    []redness - adverse reaction:     11 min Therapeutic Exercise:  [x] See flow sheet :   Rationale: increase ROM and increase strength to improve the patients ability to perform ADLs    30 min Neuromuscular Re-education:  [x]  See flow sheet : core stabilization activities    Rationale: increase ROM, increase strength, improve coordination, improve balance and increase proprioception  to improve the patients ability to improve mobility and upright posture         With   [x] TE   [] TA   [x] neuro   [] other: Patient Education: [x] Review HEP    [] Progressed/Changed HEP based on:   [x] positioning   [x] body mechanics   [] transfers   [] heat/ice application    [] other:      Other Objective/Functional Measures:      Pain Level (0-10 scale) post treatment: 3    ASSESSMENT/Changes in Function: Pt is limited with squatting depth. Reports increased soreness following her last visit. Challenged with maintaining TA draw during static dead bug and during quadruped exercises. Patient will continue to benefit from skilled PT services to modify and progress therapeutic interventions, address functional mobility deficits, address ROM deficits, address strength deficits, analyze and address soft tissue restrictions, analyze and cue movement patterns, analyze and modify body mechanics/ergonomics, assess and modify postural abnormalities, address imbalance/dizziness and instruct in home and community integration to attain remaining goals. [x]  See Plan of Care  []  See progress note/recertification  []  See Discharge Summary         Progress towards goals / Updated goals:  Short Term Goals: To be accomplished in 1 weeks:  1. Establish HEP for ROM & Strengthening.              MET  Long Term Goals: To be accomplished in 10 treatments:  1. Patient will be independent with HEP for ROM & Strengthening.              Eval Status:na              Reports initial compliance  2. Pt will decrease average pain rating on VAS to <2/10 to improve ease with mobility/ADLs.             Eval Status:2-8/10              Pain was declined today  3. Pt will increase FOTO score to 69 points to demonstrate increased ease with ADLs.                 Eval Status: FOTO: 53              Assess at 30 day noé   4. Pt will demonstrate proper lifting/ squatting form without cuing from therapist to improve ease with household/work activities.                Eval Status:hyper flexes at trunk, limited LE involvement              Limited with depth    PLAN  []  Upgrade activities as tolerated     [x]  Continue plan of care  []  Update interventions per flow sheet       []  Discharge due to:_  []  Other:_      Asmita Pepper PTA, CSCS 2/19/2019  11:46 AM    Future Appointments   Date Time Provider Diogo Katz   2/21/2019 11:00 AM Shade Dinh PTA [facility]   3/19/2019  1:00 PM Shawn Bonilla MD [facility] 69

## 2019-02-21 ENCOUNTER — HOSPITAL ENCOUNTER (OUTPATIENT)
Dept: PHYSICAL THERAPY | Age: 39
Discharge: HOME | End: 2019-02-21
Payer: OTHER GOVERNMENT

## 2019-02-21 PROCEDURE — 97014 ELECTRIC STIMULATION THERAPY: CPT

## 2019-02-21 PROCEDURE — 97110 THERAPEUTIC EXERCISES: CPT

## 2019-02-21 PROCEDURE — 97112 NEUROMUSCULAR REEDUCATION: CPT

## 2019-02-21 NOTE — PROGRESS NOTES
PT DAILY TREATMENT NOTE 10-18    Patient Name: Johann Forrester  Date:2019  : 1980  [x]  Patient  Verified  Payor: DEPARTMENT OF LABOR / Plan: [address] [place] [address] Avenue / Product Type: Workers Comp /    In [employer] time:1145  Total Treatment Time (min): 50  Visit #: 4 of 10    Treatment Area: Low back pain [M54.5]    SUBJECTIVE  Pain Level (0-10 scale): 2  Any medication changes, allergies to medications, adverse drug reactions, diagnosis change, or new procedure performed?: [x] No    [] Yes (see summary sheet for update)  Subjective functional status/changes:   [] No changes reported  \"My right hip and the right side of my back hurt. \"    OBJECTIVE    Modality rationale: decrease pain and increase tissue extensibility to improve the patients ability to improve mobility and positional tolerance    Min Type Additional Details   10 [x] Estim:  [x]Unatt       [x]IFC  []Premod                        []Other:  []w/ice   [x]w/heat  Position: prone   Location: lower back     [] Estim: []Att    []TENS instruct  []NMES                    []Other:  []w/US   []w/ice   []w/heat  Position:  Location:    []  Traction: [] Cervical       []Lumbar                       [] Prone          []Supine                       []Intermittent   []Continuous Lbs:  [] before manual  [] after manual    []  Ultrasound: []Continuous   [] Pulsed                           []1MHz   []3MHz W/cm2:  Location:    []  Iontophoresis with dexamethasone         Location: [] Take home patch   [] In clinic    []  Ice     []  heat  []  Ice massage  []  Laser   []  Anodyne Position:  Location:    []  Laser with stim  []  Other:  Position:  Location:    []  Vasopneumatic Device Pressure:       [] lo [] med [] hi   Temperature: [] lo [] med [] hi   [x] Skin assessment post-treatment:  [x]intact []redness- no adverse reaction    []redness - adverse reaction:     15 min Therapeutic Exercise:  [x] See flow sheet :   Rationale: increase ROM and increase strength to improve the patients ability to perform ADLs    25 min Neuromuscular Re-education:  [x]  See flow sheet : core stabilization activities    Rationale: increase ROM, increase strength, improve coordination, improve balance and increase proprioception  to improve the patients ability to improve mobility and upright posture        With   [x] TE   [] TA   [x] neuro   [] other: Patient Education: [x] Review HEP    [] Progressed/Changed HEP based on:   [x] positioning   [x] body mechanics   [] transfers   [] heat/ice application    [] other:      Other Objective/Functional Measures:      Pain Level (0-10 scale) post treatment: 5    ASSESSMENT/Changes in Function: Pt was challenged with hip hikes and quadruped exercises secondary to pain in her right hip and lower back. Held on several exercises secondary to pain. Poor lifting mechanics from the floor. Will attempt again from elevated surface NV. Patient will continue to benefit from skilled PT services to modify and progress therapeutic interventions, address functional mobility deficits, address ROM deficits, address strength deficits, analyze and address soft tissue restrictions, analyze and cue movement patterns, analyze and modify body mechanics/ergonomics, assess and modify postural abnormalities, address imbalance/dizziness and instruct in home and community integration to attain remaining goals. [x]  See Plan of Care  []  See progress note/recertification  []  See Discharge Summary         Progress towards goals / Updated goals:  Short Term Goals: To be accomplished in 1 weeks:  1. Establish HEP for ROM & Strengthening.              MET  Long Term Goals: To be accomplished in 10 treatments:  1. Patient will be independent with HEP for ROM & Strengthening.              Eval Status:na              Reports initial compliance  2. Pt will decrease average pain rating on VAS to <2/10 to improve ease with mobility/ADLs.    Ilana Mcclendon Status:2-8/10              Pain was declined today  3. Pt will increase FOTO score to 69 points to demonstrate increased ease with ADLs.                Eval Status: FOTO: 53              Assess at 30 day noé   4. Pt will demonstrate proper lifting/ squatting form without cuing from therapist to improve ease with household/work activities.                Eval Status:hyper flexes at trunk, limited LE involvement              Limited with depth    PLAN  []  Upgrade activities as tolerated     [x]  Continue plan of care  []  Update interventions per flow sheet       []  Discharge due to:_  []  Other:_      Jose Mcdonnell, NORAH, CSCS 2/21/2019  12:18 PM    Future Appointments   Date Time Provider Diogo Katz   2/25/2019  2:30 PM Fernando Suarez [facility]   2/27/2019  2:30 PM [facility]   3/19/2019  1:00 PM Michell Bonilla MD [id number] [facility]

## 2019-02-25 ENCOUNTER — HOSPITAL ENCOUNTER (OUTPATIENT)
Dept: PHYSICAL THERAPY | Age: 39
Discharge: HOME | End: 2019-02-25
Payer: OTHER GOVERNMENT

## 2019-02-25 PROCEDURE — 97530 THERAPEUTIC ACTIVITIES: CPT

## 2019-02-25 PROCEDURE — 97112 NEUROMUSCULAR REEDUCATION: CPT

## 2019-02-25 PROCEDURE — 97110 THERAPEUTIC EXERCISES: CPT

## 2019-02-25 NOTE — PROGRESS NOTES
PT DAILY TREATMENT NOTE 10-18    Patient Name: Chely Galindo  Date:2019  : 1980  [x]  Patient  Verified  Payor: DEPARTMENT OF LABOR / Plan: [address] [place] [address] / Product Type: Workers Comp /    In [employer] time:306  Total Treatment Time (min): 41  Visit #: 5 of 10    reatment Area: Low back pain [M54.5]    SUBJECTIVE  Pain Level (0-10 scale): 6  Any medication changes, allergies to medications, adverse drug reactions, diagnosis change, or new procedure performed?: [x] No    [] Yes (see summary sheet for update)  Subjective functional status/changes:   [] No changes reported  \"My back is hurting. \"    OBJECTIVE    Modality rationale: decrease pain to improve the patients ability to improve mobility and positional tolerance   Min Type Additional Details    [] Estim:  []Unatt       []IFC  []Premod                        []Other:  []w/ice   []w/heat  Position:  Location:    [] Estim: []Att    []TENS instruct  []NMES                    []Other:  []w/US   []w/ice   []w/heat  Position:  Location:    []  Traction: [] Cervical       []Lumbar                       [] Prone          []Supine                       []Intermittent   []Continuous Lbs:  [] before manual  [] after manual    []  Ultrasound: []Continuous   [] Pulsed                           []1MHz   []3MHz W/cm2:  Location:    []  Iontophoresis with dexamethasone         Location: [] Take home patch   [] In clinic   10 [x]  Ice     []  heat  []  Ice massage  []  Laser   []  Anodyne Position: prone  Location: lower back    []  Laser with stim  []  Other:  Position:  Location:    []  Vasopneumatic Device Pressure:       [] lo [] med [] hi   Temperature: [] lo [] med [] hi   [x] Skin assessment post-treatment:  [x]intact []redness- no adverse reaction    []redness - adverse reaction:     10 min Therapeutic Exercise:  [x] See flow sheet :   Rationale: increase ROM and increase strength to improve the patients ability to perform ADLs    8 min Therapeutic Activity:  [x]  See flow sheet : squatting/lifting mechanics   Rationale: increase ROM, increase strength, improve coordination, improve balance and increase proprioception  to improve the patients ability to improve mobility and lifting awareness    13 min Neuromuscular Re-education:  [x]  See flow sheet : core stabilization activities    Rationale: increase ROM, increase strength, improve coordination, improve balance and increase proprioception  to improve the patients ability to improve mobility and upright posture        With   [x] TE   [] TA   [x] neuro   [] other: Patient Education: [x] Review HEP    [] Progressed/Changed HEP based on:   [x] positioning   [x] body mechanics   [] transfers   [] heat/ice application    [] other:      Other Objective/Functional Measures:      Pain Level (0-10 scale) post treatment: 2-3    ASSESSMENT/Changes in Function: Modified exercises secondary to increased pain today. She reports elevated pain following last visit after attempting box lifts. We attempted this from an elevated surface and lighter object to which she was able to complete without an increase in pain. Improved response following treatment today. Patient will continue to benefit from skilled PT services to modify and progress therapeutic interventions, address functional mobility deficits, address ROM deficits, address strength deficits, analyze and address soft tissue restrictions, analyze and cue movement patterns, analyze and modify body mechanics/ergonomics, assess and modify postural abnormalities, address imbalance/dizziness and instruct in home and community integration to attain remaining goals. [x]  See Plan of Care  []  See progress note/recertification  []  See Discharge Summary         Progress towards goals / Updated goals:  Short Term Goals: To be accomplished in 1 weeks:  1.  Establish HEP for ROM & Strengthening.              MET  Long Term Goals: To be accomplished in 10 treatments:  1. Patient will be independent with HEP for ROM & Strengthening.              Eval Status:na              Reports compliance  2. Pt will decrease average pain rating on VAS to <2/10 to improve ease with mobility/ADLs.             Eval Status:2-8/10              Pain was declined today  3. Pt will increase FOTO score to 69 points to demonstrate increased ease with ADLs.                Eval Status: FOTO: 53              Assess at 30 day noé   4. Pt will demonstrate proper lifting/ squatting form without cuing from therapist to improve ease with household/work activities.                Eval Status:hyper flexes at trunk, limited LE involvement              Limited with depth    PLAN  []  Upgrade activities as tolerated     [x]  Continue plan of care  []  Update interventions per flow sheet       []  Discharge due to:_  []  Other:_      Asmita Pepper PTA, CSCS 2/25/2019  3:14 PM    Future Appointments   Date Time Provider Diogo Katz   2/25/2019  2:30 PM Fatimah Ramon [facility]   2/27/2019  2:30 PM Romayne Clarke, PT [facility]   3/19/2019  1:00 PM Shawn Bonilla MD [facility] [address]

## 2019-02-27 ENCOUNTER — HOSPITAL ENCOUNTER (OUTPATIENT)
Dept: PHYSICAL THERAPY | Age: 39
Discharge: HOME | End: 2019-02-27
Payer: OTHER GOVERNMENT

## 2019-02-27 PROCEDURE — 97112 NEUROMUSCULAR REEDUCATION: CPT

## 2019-02-27 PROCEDURE — 97110 THERAPEUTIC EXERCISES: CPT

## 2019-02-27 PROCEDURE — 97530 THERAPEUTIC ACTIVITIES: CPT

## 2019-02-27 NOTE — PROGRESS NOTES
PT DAILY TREATMENT NOTE 10-18    Patient Name: Lesa Stout  Date:2019  : 1980  [x]  Patient  Verified  Payor: DEPARTMENT OF LABOR / Plan: [address] [place] [address] Avenue / Product Type: Workers Comp /    In [employer] time:308  Total Treatment Time (min): 33  Visit #: 6 of 10      Treatment Area: Low back pain [M54.5]    SUBJECTIVE  Pain Level (0-10 scale): 1  Any medication changes, allergies to medications, adverse drug reactions, diagnosis change, or new procedure performed?: [x] No    [] Yes (see summary sheet for update)  Subjective functional status/changes:   [] No changes reported  \"I'm frustrated because I'm ready to go back to work but I'm still hurting. \"    OBJECTIVE      10 min Therapeutic Exercise:  [x] See flow sheet :   Rationale: increase ROM and increase strength to improve the patients ability to perform ADLs     8 min Therapeutic Activity:  [x]  See flow sheet : squatting/lifting mechanics   Rationale: increase ROM, increase strength, improve coordination, improve balance and increase proprioception  to improve the patients ability to improve mobility and lifting awareness     15 min Neuromuscular Re-education:  [x]  See flow sheet : core stabilization activities    Rationale: increase ROM, increase strength, improve coordination, improve balance and increase proprioception  to improve the patients ability to improve mobility and upright posture      With   [] TE   [] TA   [] neuro   [] other: Patient Education: [x] Review HEP    [] Progressed/Changed HEP based on:   [] positioning   [] body mechanics   [] transfers   [] heat/ice application    [] other:      Other Objective/Functional Measures:      Pain Level (0-10 scale) post treatment: 7    ASSESSMENT/Changes in Function: Ms. Kit Tyson needs consistent cuing to avoid painful ranges of motions and activities.  Remains very painful with low level movements and has been mostly unable to progress with weight bearing and resistive activities. Pain fluctuates depending on the day. Patient will continue to benefit from skilled PT services to modify and progress therapeutic interventions, address functional mobility deficits, address ROM deficits, address strength deficits, analyze and address soft tissue restrictions, analyze and cue movement patterns, analyze and modify body mechanics/ergonomics, assess and modify postural abnormalities, address imbalance/dizziness and instruct in home and community integration to attain remaining goals. []  See Plan of Care  []  See progress note/recertification  []  See Discharge Summary         Progress towards goals / Updated goals:  Short Term Goals: To be accomplished in 1 weeks:  1. Establish HEP for ROM & Strengthening.              MET  Long Term Goals: To be accomplished in 10 treatments:  1. Patient will be independent with HEP for ROM & Strengthening.              Eval Status:na              Reports compliance  2. Pt will decrease average pain rating on VAS to <2/10 to improve ease with mobility/ADLs.             Eval Status:2-8/10              Pain was declined today  3. Pt will increase FOTO score to 69 points to demonstrate increased ease with ADLs.                Eval Status: FOTO: 53              Assess at 30 day noé   4. Pt will demonstrate proper lifting/ squatting form without cuing from therapist to improve ease with household/work activities.                Eval Status:hyper flexes at trunk, limited LE involvement              Limited with depth        PLAN  []  Upgrade activities as tolerated     [x]  Continue plan of care  []  Update interventions per flow sheet       []  Discharge due to:_  []  Other:_      Сергей Goodrich, PT 2/27/2019  3:17 PM    Future Appointments   Date Time Provider Diogo Francoisisti   3/5/2019  2:00 PM Stefania Griffin PT [facility] [address]   3/7/2019  2:30 PM Vladimir Molina PTA [facility] [address]   3/19/2019  1:00 PM So Gagnon MD [facility]

## 2019-03-05 ENCOUNTER — HOSPITAL ENCOUNTER (OUTPATIENT)
Dept: PHYSICAL THERAPY | Age: 39
Discharge: HOME | End: 2019-03-05
Payer: OTHER GOVERNMENT

## 2019-03-05 PROCEDURE — 97530 THERAPEUTIC ACTIVITIES: CPT

## 2019-03-05 PROCEDURE — 97112 NEUROMUSCULAR REEDUCATION: CPT

## 2019-03-05 PROCEDURE — 97110 THERAPEUTIC EXERCISES: CPT

## 2019-03-05 NOTE — PROGRESS NOTES
PT DAILY TREATMENT NOTE 10-18    Patient Name: Laura Sessions  Date:3/5/2019  : 1980  [x]  Patient  Verified  Payor: [address] [place] [id number] / Plan: [address] [place] [address] / Product Type: Workers Comp /    In [facility] [employer] time:241  Total Treatment Time (min): 41  Visit #: 7 of 10  Treatment Area: Low back pain [M54.5]    SUBJECTIVE  Pain Level (0-10 scale): 0  Any medication changes, allergies to medications, adverse drug reactions, diagnosis change, or new procedure performed?: [x] No    [] Yes (see summary sheet for update)  Subjective functional status/changes:   [] No changes reported  \"pain right this moment. \"    OBJECTIVE    Modality rationale: Patient declined   Min Type Additional Details    [] Estim:  []Unatt       []IFC  []Premod                        []Other:  []w/ice   []w/heat  Position:  Location:    [] Estim: []Att    []TENS instruct  []NMES                    []Other:  []w/US   []w/ice   []w/heat  Position:  Location:    []  Traction: [] Cervical       []Lumbar                       [] Prone          []Supine                       []Intermittent   []Continuous Lbs:  [] before manual  [] after manual    []  Ultrasound: []Continuous   [] Pulsed                           []1MHz   []3MHz W/cm2:  Location:    []  Iontophoresis with dexamethasone         Location: [] Take home patch   [] In clinic    []  Ice     []  heat  []  Ice massage  []  Laser   []  Anodyne Position:  Location:    []  Laser with stim  []  Other:  Position:  Location:    []  Vasopneumatic Device Pressure:       [] lo [] med [] hi   Temperature: [] lo [] med [] hi   [] Skin assessment post-treatment:  []intact []redness- no adverse reaction    []redness - adverse reaction:       23 min Therapeutic Exercise:  [x] See flow sheet :   Rationale: increase ROM and increase strength to improve the patients ability to perform ADLs     8 min Therapeutic Activity:  [x]  See flow sheet : squatting/lifting mechanics Rationale: increase ROM, increase strength, improve coordination, improve balance and increase proprioception  to improve the patients ability to improve mobility and lifting awareness     10 min Neuromuscular Re-education:  [x]  See flow sheet : core stabilization activities    Rationale: increase ROM, increase strength, improve coordination, improve balance and increase proprioception  to improve the patients ability to improve mobility and upright posture                With   [] TE   [] TA   [] neuro   [] other: Patient Education: [x] Review HEP    [] Progressed/Changed HEP based on:   [] positioning   [] body mechanics   [] transfers   [] heat/ice application    [] other:      Other Objective/Functional Measures:      Pain Level (0-10 scale) post treatment: 2    ASSESSMENT/Changes in Function: squatting and lifting form improving, but fatigues quickly with repetition and begins to lose quality of motion. Still needs cuing at times to keep trunk upright. Reports discomfort with no specific motion, but instead reports pain with any sort of generalized activities. She was able to progress to some prone activities today without exacerbation of symptoms. Patient reports frustration at slow return of function. Patient will continue to benefit from skilled PT services to modify and progress therapeutic interventions, address functional mobility deficits, address ROM deficits, address strength deficits, analyze and address soft tissue restrictions, analyze and cue movement patterns, analyze and modify body mechanics/ergonomics, assess and modify postural abnormalities, address imbalance/dizziness and instruct in home and community integration to attain remaining goals. []  See Plan of Care  []  See progress note/recertification  []  See Discharge Summary         Progress towards goals / Updated goals:  Short Term Goals: To be accomplished in 1 weeks:  1.  Establish HEP for ROM & Strengthening.              MET  Long Term Goals: To be accomplished in 10 treatments:  1. Patient will be independent with HEP for ROM & Strengthening.              Eval Status:na              Reports compliance  2. Pt will decrease average pain rating on VAS to <2/10 to improve ease with mobility/ADLs.             Eval Status:2-8/10              BVXA fluctuates depending on activity level  3. Pt will increase FOTO score to 69 points to demonstrate increased ease with ADLs.                Eval Status: FOTO: 53              Assess at 30 day noé   4. Pt will demonstrate proper lifting/ squatting form without cuing from therapist to improve ease with household/work activities.                Eval Status:hyper flexes at trunk, limited LE involvement              Limited with depth      PLAN  []  Upgrade activities as tolerated     [x]  Continue plan of care  []  Update interventions per flow sheet       []  Discharge due to:_  []  Other:_      Yen Pantoja, PT 3/5/2019  2:42 PM    Future Appointments   Date Time Provider Diogo Katz   3/7/2019  2:30 PM Annelise Montgomery [facility]   3/19/2019  1:00 PM Kyle Haile MD [facility] [address]

## 2019-03-07 ENCOUNTER — HOSPITAL ENCOUNTER (OUTPATIENT)
Dept: PHYSICAL THERAPY | Age: 39
Discharge: HOME | End: 2019-03-07
Payer: OTHER GOVERNMENT

## 2019-03-07 PROCEDURE — 97112 NEUROMUSCULAR REEDUCATION: CPT

## 2019-03-07 PROCEDURE — 97110 THERAPEUTIC EXERCISES: CPT

## 2019-03-07 PROCEDURE — 97530 THERAPEUTIC ACTIVITIES: CPT

## 2019-03-07 NOTE — PROGRESS NOTES
PT DAILY TREATMENT NOTE 10-18    Patient Name: Ash Kyle  Date:3/7/2019  : 1980  [x]  Patient  Verified  Payor: [address] [place] [address] / Plan:  [facility] / Product Type: Workers Comp /    In [employer] time:314  Total Treatment Time (min): 44  Visit #: 8 of 10    Treatment Area: Low back pain [M54.5]    SUBJECTIVE  Pain Level (0-10 scale): 3  Any medication changes, allergies to medications, adverse drug reactions, diagnosis change, or new procedure performed?: [x] No    [] Yes (see summary sheet for update)  Subjective functional status/changes:   [] No changes reported  \"I'm hurting on the right side of my lower back. \"    OBJECTIVE    Modality rationale: patient declined   Min Type Additional Details    [] Estim:  []Unatt       []IFC  []Premod                        []Other:  []w/ice   []w/heat  Position:  Location:    [] Estim: []Att    []TENS instruct  []NMES                    []Other:  []w/US   []w/ice   []w/heat  Position:  Location:    []  Traction: [] Cervical       []Lumbar                       [] Prone          []Supine                       []Intermittent   []Continuous Lbs:  [] before manual  [] after manual    []  Ultrasound: []Continuous   [] Pulsed                           []1MHz   []3MHz W/cm2:  Location:    []  Iontophoresis with dexamethasone         Location: [] Take home patch   [] In clinic    []  Ice     []  heat  []  Ice massage  []  Laser   []  Anodyne Position:  Location:    []  Laser with stim  []  Other:  Position:  Location:    []  Vasopneumatic Device Pressure:       [] lo [] med [] hi   Temperature: [] lo [] med [] hi   [] Skin assessment post-treatment:  []intact []redness- no adverse reaction    []redness - adverse reaction:     10 min Therapeutic Exercise:  [x] See flow sheet :   Rationale: increase ROM and increase strength to improve the patients ability to perform ADLs    8 min Therapeutic Activity:  [x]  See flow sheet : squatting/lifting mechanics   Rationale: increase ROM, increase strength, improve coordination, improve balance and increase proprioception  to improve the patients ability to improve mobility and lifting mechaniics     26 min Neuromuscular Re-education:  [x]  See flow sheet : core stabilization activities    Rationale: increase ROM, increase strength, improve coordination, improve balance and increase proprioception  to improve the patients ability to improve mobility and upright posture        With   [x] TE   [x] TA   [x] neuro   [] other: Patient Education: [x] Review HEP    [] Progressed/Changed HEP based on:   [x] positioning   [x] body mechanics   [] transfers   [] heat/ice application    [] other:      Other Objective/Functional Measures:   FOTO 61     Pain Level (0-10 scale) post treatment: 0    ASSESSMENT/Changes in Function: Ms. Carlos Aldrich has been a pleasure to treat and reports 60-70% improvement since beginning therapy. Pt reports improvements with bending and sit to stands. She reports continued difficulty with sleeping, lying prone, and squatting. Demonstrates improved squatting mechanics, but still needs quite a bit of cuing for flexed trunk and anterior weight shifting. We will continue with therapy to address her remaining functional deficits. Patient will continue to benefit from skilled PT services to modify and progress therapeutic interventions, address functional mobility deficits, address ROM deficits, address strength deficits, analyze and address soft tissue restrictions, analyze and cue movement patterns, analyze and modify body mechanics/ergonomics, assess and modify postural abnormalities, address imbalance/dizziness and instruct in home and community integration to attain remaining goals. [x]  See Plan of Care  [x]  See progress note/recertification  []  See Discharge Summary         Progress towards goals / Updated goals:  Short Term Goals: To be accomplished in 1 weeks:  1. Establish HEP for ROM & Strengthening.              MET  Long Term Goals: To be accomplished in 10 treatments:  1. Patient will be independent with HEP for ROM & Strengthening.  Mullan Mic Status:na              [id number]; Reports compliance, will update as we near discharge  2. Pt will decrease average pain rating on VAS to <2/10 to improve ease with mobility/ADLs.             Eval Status:2-8/10              PROGRESSING; 7/10 on average  3. Pt will increase FOTO score to 69 points to demonstrate increased ease with ADLs.                Eval Status: FOTO: 53              PROGRESSING; 61   4. Pt will demonstrate proper lifting/ squatting form without cuing from therapist to improve ease with household/work activities.             Eval Status:hyper flexes at trunk, limited LE involvement              PROGRESSING; remains limited as she hyperflexes through the trunk and shifts weight anteriorly    Functional Gains: bending, sit to stands  Functional Deficits: lying prone, sleeping, squatting/lifting  % improvement: 60-70%  Pain   Average: 7/10       Best: 2/10     Worst: 7/10  Patient Goal: \"get back to work. \"    PLAN  []  Upgrade activities as tolerated     [x]  Continue plan of care  []  Update interventions per flow sheet       []  Discharge due to:_  []  Other:_      Sarika Perez, PTA, CSCS 3/7/2019  3:16 PM    Future Appointments   Date Time Provider Diogo Katz   3/19/2019  1:00 PM Claria Severin, MD [facility] [address]

## 2019-03-12 ENCOUNTER — HOSPITAL ENCOUNTER (OUTPATIENT)
Dept: PHYSICAL THERAPY | Age: 39
Discharge: HOME | End: 2019-03-12
Payer: OTHER GOVERNMENT

## 2019-03-12 PROCEDURE — 97110 THERAPEUTIC EXERCISES: CPT

## 2019-03-12 PROCEDURE — 97530 THERAPEUTIC ACTIVITIES: CPT

## 2019-03-12 PROCEDURE — 97112 NEUROMUSCULAR REEDUCATION: CPT

## 2019-03-12 NOTE — PROGRESS NOTES
PT DAILY TREATMENT NOTE 10-18    Patient Name: Michelle Garrido  Date:3/12/2019  : 1980  [x]  Patient  Verified  Payor: DEPARTMENT OF LABOR / Plan: [address] [place] [address] Avenue / Product Type: Workers Comp /    In time:330  Out time:416  Total Treatment Time (min): 46  Visit #: 1 of 4    Treatment Area: Low back pain [M54.5]    SUBJECTIVE  Pain Level (0-10 scale): 0  Any medication changes, allergies to medications, adverse drug reactions, diagnosis change, or new procedure performed?: [x] No    [] Yes (see summary sheet for update)  Subjective functional status/changes:   [] No changes reported  \"No pain. \"    OBJECTIVE    Modality rationale: decrease pain to improve the patients ability to improve mobility and positional tolerance   Min Type Additional Details    [] Estim:  []Unatt       []IFC  []Premod                        []Other:  []w/ice   []w/heat  Position:  Location:    [] Estim: []Att    []TENS instruct  []NMES                    []Other:  []w/US   []w/ice   []w/heat  Position:  Location:    []  Traction: [] Cervical       []Lumbar                       [] Prone          []Supine                       []Intermittent   []Continuous Lbs:  [] before manual  [] after manual    []  Ultrasound: []Continuous   [] Pulsed                           []1MHz   []3MHz W/cm2:  Location:    []  Iontophoresis with dexamethasone         Location: [] Take home patch   [] In clinic   10 [x]  Ice     []  heat  []  Ice massage  []  Laser   []  Anodyne Position: prone   Location: lower back     []  Laser with stim  []  Other:  Position:  Location:    []  Vasopneumatic Device Pressure:       [] lo [] med [] hi   Temperature: [] lo [] med [] hi   [x] Skin assessment post-treatment:  [x]intact []redness- no adverse reaction    []redness - adverse reaction:     10 min Therapeutic Exercise:  [x] See flow sheet :   Rationale: increase ROM and increase strength to improve the patients ability to perform ADLs    8 min Therapeutic Activity:  [x]  See flow sheet : squatting/lifting mechanics   Rationale: increase ROM, increase strength, improve coordination, improve balance and increase proprioception  to improve the patients ability to improve mobility and lifting mechanics     18 min Neuromuscular Re-education:  [x]  See flow sheet : core stabilization activities    Rationale: increase ROM, increase strength, improve coordination, improve balance and increase proprioception  to improve the patients ability to improve mobility and upright posture        With   [x] TE   [x] TA   [x] neuro   [] other: Patient Education: [x] Review HEP    [] Progressed/Changed HEP based on:   [x] positioning   [x] body mechanics   [] transfers   [] heat/ice application    [] other:      Other Objective/Functional Measures:      Pain Level (0-10 scale) post treatment: 3    ASSESSMENT/Changes in Function: Pt is making progress with her lifting mechanics. Reports pain in her right QL region during supine piriformis stretch and requested to hold on remaining supine and prone exercises. She reports that ice decreased pain, but was challenged with sit to stands when getting off the plinth. Patient will continue to benefit from skilled PT services to modify and progress therapeutic interventions, address functional mobility deficits, address ROM deficits, address strength deficits, analyze and address soft tissue restrictions, analyze and cue movement patterns, analyze and modify body mechanics/ergonomics, assess and modify postural abnormalities, address imbalance/dizziness and instruct in home and community integration to attain remaining goals. [x]  See Plan of Care  []  See progress note/recertification  []  See Discharge Summary         Progress towards goals / Updated goals:  1.  Patient will be independent with HEP for ROM & Strengthening.              Eval Status:na              PROGRESSING; Reports compliance, will update as we near discharge  2. Pt will decrease average pain rating on VAS to <2/10 to improve ease with mobility/ADLs.             Eval Status:2-8/10              PROGRESSING; 7/10 on average  3. Pt will increase FOTO score to 69 points to demonstrate increased ease with ADLs.                Eval Status: FOTO: 53              PROGRESSING; 61   4. Pt will demonstrate proper lifting/ squatting form without cuing from therapist to improve ease with household/work activities.                Eval Status:hyper flexes at trunk, limited LE involvement              PROGRESSING; remains limited as she hyperflexes through the trunk and shifts weight anteriorly    PLAN  []  Upgrade activities as tolerated     [x]  Continue plan of care  []  Update interventions per flow sheet       []  Discharge due to:_  []  Other:_      Irma Foss PTA, [facility] 3/12/2019  4:20 PM    Future Appointments   Date Time Provider Diogo Katz   3/12/2019  3:30 PM [facility]   3/14/2019  3:30 PM Vladimir Molina PTA [facility]   3/18/2019  3:30 PM [facility]   3/19/2019  1:00 PM Tomasa Bonilla MD [facility] 69

## 2019-03-12 NOTE — PROGRESS NOTES
[facility] - [facility] 85  [address], [address] [phone] fax    Physician Update  [x] Progress Note  [] Discharge Summary    Patient name: Kyrie Matos Start of Care: 2019   Referral source: Megha Espitia MD : 1980                Medical Diagnosis: Low back pain [M54.5]  Payor: [id number] [facility] / Plan: [employer] [facility] / Product Type: Workers Comp /  Onset Date:2018                Treatment Diagnosis: LBP   Prior Hospitalization: see medical history Provider#: [id number]   Medications: Verified on Patient summary List    Comorbidities: thyroid   Prior Level of Function: works as [employer]. Has been out of work due to injury. Lives in 2nd level apartment. Active mother of 3    Visits from Start of Care: 8    Missed Visits: 0      Progress towards Goals:  Short Term Goals: To be accomplished in 1 weeks:  1. Establish HEP for ROM & Strengthening.              MET  Long Term Goals: To be accomplished in 10 treatments:  1. Patient will be independent with HEP for ROM & Strengthening.  Francoise Harris Status:elizabeth MARCUM; Reports compliance, will update as we near discharge  2. Pt will decrease average pain rating on VAS to <2/10 to improve ease with mobility/ADLs.             Eval Status:2-8/10              PROGRESSING; 7/10 on average  3. Pt will increase FOTO score to 69 points to demonstrate increased ease with ADLs.                Eval Status: FOTO: 53              PROGRESSING; 61   4. Pt will demonstrate proper lifting/ squatting form without cuing from therapist to improve ease with household/work activities.                Eval Status:hyper flexes at trunk, limited LE involvement              PROGRESSING; remains limited as she hyperflexes through the trunk and shifts weight anteriorly     Functional Gains: bending, sit to stands  Functional Deficits: lying prone, sleeping, squatting/lifting  % improvement: 60-70%  Pain   Average: 7/10                  Best: 2/10                Worst: 7/10  Patient Goal: \"get back to work. \"      Goals: to be achieved in 4 visits:  1. Patient will be independent with HEP for ROM & Strengthening.  Billy Claros Status:na              [id number]; Reports compliance, will update as we near discharge  2. Pt will decrease average pain rating on VAS to <2/10 to improve ease with mobility/ADLs.             Eval Status:2-8/10              PROGRESSING; 7/10 on average  3. Pt will increase FOTO score to 69 points to demonstrate increased ease with ADLs.                Eval Status: FOTO: 53              PROGRESSING; 61   4. Pt will demonstrate proper lifting/ squatting form without cuing from therapist to improve ease with household/work activities.             Eval Status:hyper flexes at trunk, limited LE involvement              PROGRESSING; remains limited as she hyperflexes through the trunk and shifts weight anteriorly       ASSESSMENT/RECOMMENDATIONS:  Ms. Madison Jackson has seen some improvement with PT over the past month, but does remain limited with squatting and lifting form. She remains hesitant to perform lifting tasks needed to return to full duty at work for fear of pain. Pain remains intermittent and related to activity level. Recommend continued therapy to work on remaining functional limitations. [x]Continue therapy per initial plan/protocol at a frequency of  2 x per week for 2 weeks    Thank you for this referral.   Сергей Goodrich, PT 3/12/2019 12:42 PM  NOTE TO PHYSICIAN:  PLEASE COMPLETE THE ORDERS BELOW AND   FAX TO [facility]: [phone]  If you are unable to process this request in 24 hours please contact our office: [phone]    []  I have read the above report and request that my patient continue as recommended.   []  I have read the above report and request that my patient continue therapy with the following changes/special instructions:________________________________________  []I have read the above report and request that my patient be discharged from therapy.     [de-identified] Signature:____________Date:_________TIME:________    [facility], Date and Time must be completed for valid certification **

## 2019-03-14 ENCOUNTER — HOSPITAL ENCOUNTER (OUTPATIENT)
Dept: PHYSICAL THERAPY | Age: 39
Discharge: HOME | End: 2019-03-14
Payer: OTHER GOVERNMENT

## 2019-03-14 PROCEDURE — 97530 THERAPEUTIC ACTIVITIES: CPT

## 2019-03-14 PROCEDURE — 97112 NEUROMUSCULAR REEDUCATION: CPT

## 2019-03-14 PROCEDURE — 97110 THERAPEUTIC EXERCISES: CPT

## 2019-03-14 NOTE — PROGRESS NOTES
PT DAILY TREATMENT NOTE 10-18    Patient Name: Nella Resendez  Date:3/14/2019  : 1980  [x]  Patient  Verified  Payor: DEPARTMENT OF LABOR / Plan: [address] [place] [address] Avenue / Product Type: Workers Comp /    In [facility]  Total Treatment Time (min): 42  Visit #: 2 of 4    Treatment Area: Low back pain [M54.5]    SUBJECTIVE  Pain Level (0-10 scale): 0  Any medication changes, allergies to medications, adverse drug reactions, diagnosis change, or new procedure performed?: [x] No    [] Yes (see summary sheet for update)  Subjective functional status/changes:   [] No changes reported  \"No pain. \"    OBJECTIVE    Modality rationale: decrease inflammation and decrease pain to improve the patients ability to improve mobility and positional tolerance   Min Type Additional Details    [] Estim:  []Unatt       []IFC  []Premod                        []Other:  []w/ice   []w/heat  Position:  Location:    [] Estim: []Att    []TENS instruct  []NMES                    []Other:  []w/US   []w/ice   []w/heat  Position:  Location:    []  Traction: [] Cervical       []Lumbar                       [] Prone          []Supine                       []Intermittent   []Continuous Lbs:  [] before manual  [] after manual    []  Ultrasound: []Continuous   [] Pulsed                           []1MHz   []3MHz W/cm2:  Location:    []  Iontophoresis with dexamethasone         Location: [] Take home patch   [] In clinic   10 [x]  Ice     []  heat  []  Ice massage  []  Laser   []  Anodyne Position: supine   Location: lower back    []  Laser with stim  []  Other:  Position:  Location:    []  Vasopneumatic Device Pressure:       [] lo [] med [] hi   Temperature: [] lo [] med [] hi   [x] Skin assessment post-treatment:  [x]intact []redness- no adverse reaction    []redness - adverse reaction:     10 min Therapeutic Exercise:  [x] See flow sheet :   Rationale: increase ROM and increase strength to improve the patients ability to perform ADLs    8 min Therapeutic Activity:  [x]  See flow sheet : squatting/lifting mechanics   Rationale: increase ROM, increase strength, improve coordination, improve balance and increase proprioception  to improve the patients ability to improve mobility and lifting mechanics     14 min Neuromuscular Re-education:  [x]  See flow sheet : core stabilization activities    Rationale: increase ROM, increase strength, improve coordination, improve balance and increase proprioception  to improve the patients ability to improve mobility and upright posture        With   [x] TE   [x] TA   [x] neuro   [] other: Patient Education: [x] Review HEP    [] Progressed/Changed HEP based on:   [x] positioning   [x] body mechanics   [] transfers   [] heat/ice application    [] other:      Other Objective/Functional Measures:      Pain Level (0-10 scale) post treatment: 0    ASSESSMENT/Changes in Function: Pt is making progress with her lifting mechanics, but still is challenged with prone activities. Patient will continue to benefit from skilled PT services to modify and progress therapeutic interventions, address functional mobility deficits, address ROM deficits, address strength deficits, analyze and address soft tissue restrictions, analyze and cue movement patterns, analyze and modify body mechanics/ergonomics, assess and modify postural abnormalities, address imbalance/dizziness and instruct in home and community integration to attain remaining goals. [x]  See Plan of Care  []  See progress note/recertification  []  See Discharge Summary         Progress towards goals / Updated goals:  1. Patient will be independent with HEP for ROM & Strengthening.              Eval Status:na              PROGRESSING; Reports compliance, will update as we near discharge  2. Pt will decrease average pain rating on VAS to <2/10 to improve ease with mobility/ADLs.                Eval Status:2-8/10              PROGRESSING; 7/10 on average  3. Pt will increase FOTO score to 69 points to demonstrate increased ease with ADLs.                Eval Status: FOTO: 53              PROGRESSING; 61   4. Pt will demonstrate proper lifting/ squatting form without cuing from therapist to improve ease with household/work activities.                Eval Status:hyper flexes at trunk, limited LE involvement              PROGRESSING; remains limited as she hyperflexes through the trunk and shifts weight anteriorly    PLAN  []  Upgrade activities as tolerated     [x]  Continue plan of care  []  Update interventions per flow sheet       []  Discharge due to:_  []  Other:_      Paula Madrid, PTA, CSCS 3/14/2019  4:19 PM    Future Appointments   Date Time Provider Diogo Katz   3/14/2019  3:30 PM Jeronimo Scarce [facility]   3/18/2019  3:30 PM Thi Recinos PT [facility]   3/19/2019  1:00 PM [facility]illa MD [facility]

## 2019-03-18 ENCOUNTER — APPOINTMENT (OUTPATIENT)
Dept: PHYSICAL THERAPY | Age: 39
End: 2019-03-18
Payer: OTHER GOVERNMENT

## 2019-03-21 ENCOUNTER — HOSPITAL ENCOUNTER (OUTPATIENT)
Dept: PHYSICAL THERAPY | Age: 39
Discharge: HOME | End: 2019-03-21
Payer: OTHER GOVERNMENT

## 2019-03-21 PROCEDURE — 97110 THERAPEUTIC EXERCISES: CPT

## 2019-03-21 NOTE — PROGRESS NOTES
PT DAILY TREATMENT NOTE 10-18    Patient Name: Yanira Moya  Date:3/21/2019  : 1980  [x]  Patient  Verified  Payor: DEPARTMENT OF LABOR / Plan: [address] [place] [address] Avenue / Product Type: Workers Comp /    In time:9:00  Out time:9:52  Total Treatment Time (min): 52  Visit #: 3 of 4    Medicare/BCBS Only   Total Timed Codes (min):  42 1:1 Treatment Time:  30       Treatment Area: Low back pain [M54.5]    SUBJECTIVE  Pain Level (0-10 scale): 0/10  Any medication changes, allergies to medications, adverse drug reactions, diagnosis change, or new procedure performed?: [x] No    [] Yes (see summary sheet for update)  Subjective functional status/changes:   [] No changes reported  Pt reports being compliant with her HEP.     OBJECTIVE    Modality rationale: decrease pain to improve the patients ability to perform functional task with more ease   Min Type Additional Details    [] Estim:  []Unatt       []IFC  []Premod                        []Other:  []w/ice   []w/heat  Position:  Location:    [] Estim: []Att    []TENS instruct  []NMES                    []Other:  []w/US   []w/ice   []w/heat  Position:  Location:    []  Traction: [] Cervical       []Lumbar                       [] Prone          []Supine                       []Intermittent   []Continuous Lbs:  [] before manual  [] after manual    []  Ultrasound: []Continuous   [] Pulsed                           []1MHz   []3MHz W/cm2:  Location:    []  Iontophoresis with dexamethasone         Location: [] Take home patch   [] In clinic   10 [x]  Ice     []  heat  []  Ice massage  []  Laser   []  Anodyne Position:Semi sutton w/wedge  Location: L/S    []  Laser with stim  []  Other:  Position:  Location:    []  Vasopneumatic Device Pressure:       [] lo [] med [] hi   Temperature: [] lo [] med [] hi   [] Skin assessment post-treatment:  []intact []redness- no adverse reaction    []redness - adverse reaction:       42 min Therapeutic Exercise:  [x] See flow sheet :   Rationale: increase ROM and increase strength to improve the patients ability to perform ADL's with more ease. With   [x] TE   [] TA   [] neuro   [] other: Patient Education: [x] Review HEP    [] Progressed/Changed HEP based on:   [] positioning   [] body mechanics   [] transfers   [] heat/ice application    [] other:      Other Objective/Functional Measures: Performed TE's per flow sheet. Pain Level (0-10 scale) post treatment: 0/10    ASSESSMENT/Changes in Function: Pt performed TE's well. Pt had slight pain in LB during box lifts, although she demonstrated good form. Patient will continue to benefit from skilled PT services to modify and progress therapeutic interventions, address functional mobility deficits, address ROM deficits, address strength deficits, analyze and address soft tissue restrictions and analyze and cue movement patterns to attain remaining goals. []  See Plan of Care  []  See progress note/recertification  []  See Discharge Summary         Progress towards goals / Updated goals:  1. Patient will be independent with HEP for ROM & Strengthening.              Eval Status:na              PROGRESSING; Reports compliance, will update as we near discharge  2. Pt will decrease average pain rating on VAS to <2/10 to improve ease with mobility/ADLs.             Eval Status:2-8/10              PROGRESSING; 7/10 on average  3. Pt will increase FOTO score to 69 points to demonstrate increased ease with ADLs.                Eval Status: FOTO: 53              PROGRESSING; 61   4. Pt will demonstrate proper lifting/ squatting form without cuing from therapist to improve ease with household/work activities.                Eval Status:hyper flexes at trunk, limited LE involvement              PROGRESSING; remains limited as she hyperflexes through the trunk and shifts weight anteriorly      PLAN  []  Upgrade activities as tolerated     []  Continue plan of care  [] Update interventions per flow sheet       []  Discharge due to:_  []  Other:_      Deond Sepideh, NORAH 3/21/2019  9:45 AM    Future Appointments   Date Time Provider Diogo Katz   3/22/2019  2:50 PM Nawaf Bonilla MD [facility] 69

## 2019-03-29 ENCOUNTER — OFFICE VISIT (OUTPATIENT)
Dept: ORTHOPEDIC SURGERY | Facility: CLINIC | Age: 39
End: 2019-03-29

## 2019-03-29 VITALS
TEMPERATURE: 98.4 F | HEIGHT: 66 IN | OXYGEN SATURATION: 96 % | HEART RATE: 73 BPM | DIASTOLIC BLOOD PRESSURE: 78 MMHG | WEIGHT: 207.8 LBS | SYSTOLIC BLOOD PRESSURE: 129 MMHG | BODY MASS INDEX: 33.4 KG/M2 | RESPIRATION RATE: 16 BRPM

## 2019-03-29 DIAGNOSIS — S39.012D BACK STRAIN, SUBSEQUENT ENCOUNTER: Primary | ICD-10-CM

## 2019-03-29 NOTE — PROGRESS NOTES
1. Have you been to the ER, urgent care clinic since your last visit? Hospitalized since your last visit? Yes, [facility] 2. Have you seen or consulted any other health care providers outside of the [facility] since your last visit? Include any pap smears or colon screening. Yes, see above

## 2019-03-29 NOTE — PROGRESS NOTES
Patient: Kyrie Matos                MRN: [id number]       SSN: [id number] YOB: 1980        AGE: 45 y.o. SEX: female Body mass index is 33.54 kg/m². PCP: Farida Black MD 
03/29/19 Chief Complaint: Low back follow up HISTORY OF PRESENT ILLNESS:   Mis Barrera returns today for her low back. She has been in physical therapy. She is fortunately doing better. She does have some concerns about going back to work full duty at this point in time, but her pain is much better and she says on a daily basis she has very little pain. Past Medical History:  
Diagnosis Date  Diabetes ([address] Pt. states pre-diabetic  Female bladder prolapse  Thyroid disease Pt. states they are watching her levels.  Unspecified urinary incontinence Family History Problem Relation Age of Onset  Hypertension Father  Heart Disease Father  Hypertension Paternal Grandfather  Hypertension Mother  Hypertension Brother  Cancer Neg Hx  Diabetes Neg Hx Current Outpatient Medications Medication Sig Dispense Refill  cyclobenzaprine (FLEXERIL) 10 mg tablet Take 1 Tab by mouth three (3) times daily as needed for Muscle Spasm(s). 14 Tab 0  
 dicyclomine (BENTYL) 20 mg tablet Take 20 mg by mouth every six (6) hours as needed.  BUDESONIDE PO Take 3 mg by mouth three (3) times daily.  MESALAMINE (PENTASA PO) Take  by mouth two (2) times a day.  ibuprofen (MOTRIN) 600 mg tablet ibuprofen 600 mg tablet TAKE 1 TABLET BY MOUTH EVERY 8 HOURS    
 LORazepam (ATIVAN) 1 mg tablet 1-2 tab PO 20 mins Prior to procedure 3 Tab 0  
 meloxicam (MOBIC) 7.5 mg tablet Take 1 Tab by mouth daily. 20 Tab 2 No Known Allergies Past Surgical History:  
Procedure Laterality Date  COLONOSCOPY N/A 9/1/2017 COLONOSCOPY, DIAGNOSTIC /c Bx performed by Sloan Cohn MD at [place] [address] Pt with 2 previous abortions, and multiple surgeries to release adhesions Social History Socioeconomic History  Marital status:  Spouse name: Not on file  Number of children: Not on file  Years of education: Not on file  Highest education level: Not on file Occupational History  Not on file Social Needs  Financial resource strain: Not on file  Food insecurity:  
  Worry: Not on file Inability: Not on file  Transportation needs:  
  Medical: Not on file Non-medical: Not on file Tobacco Use  Smoking status: Never Smoker  Smokeless tobacco: Never Used Substance and Sexual Activity  Alcohol use: Yes Comment: occassionally  Drug use: No  
 Sexual activity: Yes  
  Partners: Male Lifestyle  Physical activity:  
  Days per week: Not on file Minutes per session: Not on file  Stress: Not on file Relationships  Social connections:  
  Talks on phone: Not on file Gets together: Not on file Attends [employer] service: Not on file Active member of club or organization: Not on file Attends meetings of clubs or organizations: Not on file Relationship status: Not on file  Intimate partner violence:  
  Fear of current or ex partner: Not on file Emotionally abused: Not on file Physically abused: Not on file Forced sexual activity: Not on file Other Topics Concern  Not on file Social History Narrative  Not on file REVIEW OF SYSTEMS:   
 
No changes from previous review of systems unless noted. PHYSICAL EXAMINATION: 
Visit Vitals /78 (BP 1 Location: Left arm, BP Patient Position: Sitting) Pulse 73 Temp 98.4 °F (36.9 °C) (Oral) Resp 16 Ht 5' 6\" (1.676 m) Wt 207 lb 12.8 oz (94.3 kg) SpO2 96% BMI 33.54 kg/m² Body mass index is 33.54 kg/m². GENERAL: Alert and oriented x3, in no acute distress. HEENT: Normocephalic, atraumatic. RESP: Non labored breathing. SKIN: No rashes or lesions noted. PHYSICAL EXAMINATION:   Physical exam of the lumbar spine is benign. She has good range of motion. Nontender to palpation about the lumbar and thoracic spine. She has no focal neurological deficits with 5/5 strength distally. ASSESSMENT/PLAN:   Marcelle Hawk is recovering well from her work related injury now that she has been on the appropriate treatment with therapy. Due to the amount of weight that she would be lifting, I would like for her to do a work hardening program for the next two weeks and plan to see her back in two weeks. I have kept her out of work until that time. I did order the physical therapy work hardening program today.   
 
 
 
 
 
 
 
 
Electronically signed by: Maximiliano Torres MD

## 2019-04-04 ENCOUNTER — HOSPITAL ENCOUNTER (OUTPATIENT)
Dept: PHYSICAL THERAPY | Age: 39
Discharge: HOME | End: 2019-04-04
Payer: OTHER GOVERNMENT

## 2019-04-04 PROCEDURE — 97110 THERAPEUTIC EXERCISES: CPT | Performed by: PHYSICAL THERAPIST

## 2019-04-04 PROCEDURE — 97530 THERAPEUTIC ACTIVITIES: CPT | Performed by: PHYSICAL THERAPIST

## 2019-04-04 PROCEDURE — 97164 PT RE-EVAL EST PLAN CARE: CPT | Performed by: PHYSICAL THERAPIST

## 2019-04-04 NOTE — PROGRESS NOTES
PT DAILY TREATMENT NOTE 10-18 Patient Name: Marietta Bloom Date:2019 : 1980 [x]  Patient  Verified Payor: DEPARTMENT OF LABOR / Plan: [address] [place] [address] Avenue / Product Type: Workers Comp / In time:3:15P  Out time:4:00P Total Treatment Time (min): 45min Visit #: 1 of 13 Medicare/BCBS Only Total Timed Codes (min):   1:1 Treatment Time:    
 
 
Treatment Area: Strain of muscle, fascia and tendon of lower back, subsequent encounter [C54.415E] SUBJECTIVE Pain Level (0-10 scale): 2/10 Any medication changes, allergies to medications, adverse drug reactions, diagnosis change, or new procedure performed?: [x] No    [] Yes (see summary sheet for update) Subjective functional status/changes:   [] No changes reported Patient states she has been feeling pretty good, just increased pain for one day after trying to life more than 26lbs. OBJECTIVE 15 min []Eval                  [x]Re-Eval  
 
 
15 min Therapeutic Exercise:  [x] See flow sheet :  
Rationale: increase ROM and increase strength to improve the patients ability to improve A/ROM and decrease pain with movement. 15 min Therapeutic Activity:  [x]  See flow sheet :  
Rationale: improve coordination, improve balance and increase proprioception  to improve the patients ability to improve the patients ability perform basic ADLs and job-related tasks without pain. With 
 [x] TE 
 [x] TA [x] neuro 
 [] other: Patient Education: [x] Review HEP [x] Progressed/Changed HEP based on:  
[x] positioning   [] body mechanics   [] transfers   [] heat/ice application   
[] other:   
 
Other Objective/Functional Measures: Lumbar A/ROM appears WNL, MMT of LEs grossly 4/5 throughout, able to SLS >20 secs Bilaterally, no c/o numbness or tingling. Pain Level (0-10 scale) post treatment: 0/10 ASSESSMENT/Changes in Function: Patient is a pleasant younger adult female with continued discomfort in lumbar spine with repeated lifting of greater than 26lbs. She has completed 11 visits of skilled PT therapy with moderate improvement, but remains unable to return to work second to not being able to lift and carry more than 50lbs without intermittent pain. She continues to have no radicular symptoms and appears eager to progress, but understandably fearful after 6 months absence from work. Anticipate she is an excellent candidate for the [employer].  
Patient will continue to benefit from skilled PT services to modify and progress therapeutic interventions, address functional mobility deficits, address ROM deficits, address strength deficits, analyze and address soft tissue restrictions, analyze and cue movement patterns, analyze and modify body mechanics/ergonomics and assess and modify postural abnormalities to attain remaining goals. [x]  See Plan of Care 
[]  See progress note/recertification 
[]  See Discharge Summary Progress towards goals / Updated goals: 
Patient reports increased anxiety, fear of pain, and uncertainty with return to work which is a  for [employer], considering the length of time she has been out of work at this time. Patient perceives she can lift up to 26lbs without increased pain. At initial work conditioning visit today, she does report being able to carry up to 50lbs without pain. Since her last PT visit, she has also discovered she is pregnant with her 4th child. Discussed possible lifting restrictions per MD recommendation and encouraged her to clarify this as soon as possible. NEW Goals: to be achieved in 4 weeks: for a total of 12 visits, 3x/week at 2 hours each 1. Patient will demonstrate increased pain of no more than 2 points on Verbal Analog Scale lasting no more than 48 hours after increased resistance on lifting of 50lbs at 5 sets of 3 repetitions over a two hour period. (Eval: 26lbs). 2. Patient will demonstrate ability to stoop and lift 65lbs (eval 50lbs) for 5 sets of 3 repetitions over a 2 hour period without difficulty 3. Patient will improve squatting tolerance time to 50lbs (Eval: 20lbs) without increased pain.   
4. Patient will demonstrate ability to carry 75lbs 100ft for 5 repetitions over a 2 hour period. 5. 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 6. Patient will be able to step up on 20\" box carrying 50lbs 5 repetitions over a 2 hour period without lasting increase of pain. PLAN [x]  Upgrade activities as tolerated     []  Continue plan of care 
[]  Update interventions per flow sheet      
[]  Discharge due to:_ 
[]  Other:_ Trinity Bloom, PT 4/4/2019  4:52 PM 
 
Future Appointments Date Time Provider Diogo Katz 4/12/2019  1:50 PM Yunior Bonilla MD [facility] [address] Manuel 69

## 2019-04-04 NOTE — PROGRESS NOTES
In JasielMaria Eugenia Hernandez [address], [address] [place], [address] [place], [place] [address]y [address],[address] 
[phone] fax Physician Update [x] Progress Note/New Order Update  [] Discharge Summary Patient Debby Guallpa Start of Care: 2/5/2019 Referral Dalia Dallas MD IUY: 44/50/5093               
Medical Diagnosis: Low back pain [M54.5] Payor: DEPARTMENT OF LABOR / Plan: Funding [facility] / Product Type: Workers Germania Mclaughlin Onset Date:9/27/2018               
Treatment Diagnosis: LBP Prior Hospitalization: see medical history Provider#: [id number] Medications: Verified on Patient summary List  
 Comorbidities: thyroid 
 Prior Level of Function: works as [employer] . Has been out of work due to injury. Lives in [address]nd level apartment. Active mother of 3. Visits from Start of Care: 11    Missed Visits: 0 Status at Evaluation/Last Progress Note: Ms. Ortega Resendez has seen some improvement with PT over the past month, but does remain limited with squatting and lifting form. She remains hesitant to perform lifting tasks needed to return to full duty at work for fear of pain. Pain remains intermittent and related to activity level. Recommend continued therapy to work on remaining functional limitations. Progress towards Goals: Patient reports increased anxiety, fear of pain, and uncertainty with return to work which is a  for [employer], considering the length of time she has been out of work at this time. Patient perceives she can lift up to 26lbs without increased pain. At initial work conditioning visit today, she does report being able to carry up to 50lbs without pain. Since her last PT visit, she has also discovered she is pregnant with her 4th child. Discussed possible lifting restrictions per MD recommendation and encouraged her to clarify this as soon as possible. NEW Goals: to be achieved in 4 weeks: for a total of 12 visits, 3x/week at 2 hours each 1. Patient will demonstrate increased pain of no more than 2 points on Verbal Analog Scale lasting no more than 48 hours after increased resistance on lifting of 50lbs at 5 sets of 3 repetitions over a two hour period. (Eval: 26lbs). 2. Patient will demonstrate ability to stoop and lift 65lbs (eval 50lbs) for 5 sets of 3 repetitions over a 2 hour period without difficulty 3. Patient will improve squatting tolerance time to 50lbs (Eval: 20lbs) without increased pain. 4. Patient will demonstrate ability to carry 75lbs 100ft for 5 repetitions over a 2 hour period. 5. 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 6. Patient will be able to step up on 20\" box carrying 50lbs 5 repetitions over a 2 hour period without lasting increase of pain. ASSESSMENT/RECOMMENDATIONS: Patient is a pleasant younger adult female with continued discomfort in lumbar spine with repeated lifting of greater than 26lbs. She has completed 11 visits of skilled PT therapy with moderate improvement, but remains unable to return to work second to not being able to lift and carry more than 50lbs without intermittent pain. She continues to have no radicular symptoms and appears eager to progress, but understandably fearful after 6 months absence from work. Anticipate she is an excellent candidate for the [employer].  
[x]Continue work conditioning per new plan/protocol at a frequency of  3 x per week for 4 weeks []Continue therapy with the following recommended changes:_____________________      _____________________________________________________________________ []Discontinue therapy progressing towards or have reached established goals []Discontinue therapy due to lack of appreciable progress towards goals []Discontinue therapy due to lack of attendance or compliance []Await Physician's recommendations/decisions regarding therapy []Other:________________________________________________________________ Thank you for this referral. Chloé White, PT 4/4/2019 4:10 PM 
NOTE TO PHYSICIAN:  PLEASE COMPLETE THE ORDERS BELOW AND  
FAX TO [facility]: [phone] If you are unable to process this request in 24 hours please contact our office: [phone] ? I have read the above report and request that my patient continue as recommended. ? I have read the above report and request that my patient continue therapy with the following changes/special instructions:_____________________________________ ? I have read the above report and request that my patient be discharged from therapy.  
 
[de-identified] Signature:____________Date:_________TIME:________ 
 
[employer], Date and Time must be completed for valid certification **

## 2019-04-08 ENCOUNTER — TELEPHONE (OUTPATIENT)
Dept: ORTHOPEDIC SURGERY | Facility: CLINIC | Age: 39
End: 2019-04-08

## 2019-04-08 DIAGNOSIS — M54.50 CHRONIC BILATERAL LOW BACK PAIN WITHOUT SCIATICA: Primary | ICD-10-CM

## 2019-04-08 DIAGNOSIS — G89.29 CHRONIC BILATERAL LOW BACK PAIN WITHOUT SCIATICA: Primary | ICD-10-CM

## 2019-04-08 NOTE — TELEPHONE ENCOUNTER
Malu from [employer] still needs patients physical therapy referral updated to [facility] conditioning\" (not work hardening or work strengthening) [facility]. 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 Order will also need to be signed by provider only, not PA or nurse. Malu was advised provider out until tomorrow. Please update or advise Malu if any questions at [phone].

## 2019-04-11 ENCOUNTER — HOSPITAL ENCOUNTER (OUTPATIENT)
Dept: PHYSICAL THERAPY | Age: 39
Discharge: HOME | End: 2019-04-11
Payer: OTHER GOVERNMENT

## 2019-04-11 PROCEDURE — 97545 WORK HARDENING: CPT

## 2019-04-11 NOTE — PROGRESS NOTES
PT DAILY TREATMENT NOTE 10-18 Patient Name: Elbert Watson Date:2019 : 1980 [x]  Patient  Verified Payor: DEPARTMENT OF LABOR / Plan: [address] [place] [address] Avenue / Product Type: Workers Comp / In time:1:42  Out time:3:33 Total Treatment Time (min): 120 Visit #: 2 of 13 Medicare/BCBS Only Total Timed Codes (min):   1:1 Treatment Time:    
 
 
Treatment Area: Strain of muscle, fascia and tendon of lower back, subsequent encounter [F30.449X] SUBJECTIVE Pain Level (0-10 scale): 0 Any medication changes, allergies to medications, adverse drug reactions, diagnosis change, or new procedure performed?: [x] No    [] Yes (see summary sheet for update) Subjective functional status/changes:   [] No changes reported No pain. OBJECTIVE Modality rationale: decrease edema, decrease inflammation, decrease pain and increase tissue extensibility to improve the patients ability to perform ADL Min Type Additional Details  
 [] Estim:  []Unatt       []IFC  []Premod []Other:  []w/ice   []w/heat Position: Location:  
 [] Estim: []Att    []TENS instruct  []NMES []Other:  []w/US   []w/ice   []w/heat Position: Location:  
 []  Traction: [] Cervical       []Lumbar 
                     [] Prone          []Supine []Intermittent   []Continuous Lbs: 
[] before manual 
[] after manual  
 []  Ultrasound: []Continuous   [] Pulsed []1MHz   []3MHz W/cm2: 
Location:  
 []  Iontophoresis with dexamethasone Location: [] Take home patch  
[] In clinic  
 []  Ice     []  heat 
[]  Ice massage 
[]  Laser  
[]  Anodyne Position: Location:  
 []  Laser with stim 
[]  Other:  Position: Location:  
 []  Vasopneumatic Device Pressure:       [] lo [] med [] hi  
Temperature: [] lo [] med [] hi  
[x] Skin assessment post-treatment:  [x]intact []redness- no adverse reaction []redness  adverse reaction:  
 
 min []Eval                  []Re-Eval  
 
 
120 min Therapeutic Exercise:  [x] See flow sheet :  
Rationale: increase ROM and increase strength to improve the patients ability to perform ADL  
 
 min Therapeutic Activity:  []  See flow sheet :  
Rationale:   to improve the patients ability to  
  
 min Neuromuscular Re-education:  []  See flow sheet :  
Rationale:   to improve the patients ability to  
 
 min Manual Therapy:    
Rationale: decrease pain, increase ROM, increase tissue extensibility and decrease edema  to perform ADL  
 
 min Gait Training:  ___ feet with ___ device on level surfaces with ___ level of assist  
Rationale: With 
 [x] TE 
 [] TA 
 [] neuro 
 [] other: Patient Education: [x] Review HEP [] Progressed/Changed HEP based on:  
[] positioning   [] body mechanics   [] transfers   [] heat/ice application   
[] other:   
 
Other Objective/Functional Measures: completed  Each there ex and  activity  Fairly  Well with  Cues. Pain Level (0-10 scale) post treatment: 2 
 
ASSESSMENT/Changes in Function: [id number] there ex  Had  Minimal  Pain. Patient will continue to benefit from skilled PT services to address functional mobility deficits, address ROM deficits, address strength deficits, analyze and address soft tissue restrictions and analyze and cue movement patterns to attain remaining goals. [x]  See Plan of Care 
[]  See progress note/recertification 
[]  See Discharge Summary Progress towards goals / Updated goals: NEW Goals: to be achieved in 4 weeks: for a total of 12 visits, 3x/week at 2 hours each 1. Patient will demonstrate increased pain of no more than 2 points on Verbal Analog Scale lasting no more than 48 hours after increased resistance on lifting of 50lbs at 5 sets of 3 repetitions over a two hour period.  (Eval: 26lbs).  
2. Patient will demonstrate ability to stoop and lift 65lbs (eval 50lbs) for 5 sets of 3 repetitions over a 2 hour period without difficulty 3. Patient will improve squatting tolerance time to 50lbs (Eval: 20lbs) without increased pain.  25#   2 sets  10  4/11/19 4. Patient will demonstrate ability to carry 75lbs 100ft for 5 repetitions over a 2 hour period. 5. 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 6. Patient will be able to step up on 20\" box carrying 50lbs 5 repetitions over a 2 hour period without lasting increase of pain.  PLAN [x]  Upgrade activities as tolerated     []  Continue plan of care 
[]  Update interventions per flow sheet      
[]  Discharge due to:_ 
[]  Other:_   
 
Sarika Martínez PTA 4/11/2019  2:01 PM 
 
Future Appointments Date Time Provider Diogo Katz 4/12/2019  1:50 PM Danuta Hardin MD [facility] SCHED  
4/16/2019  3:00 PM Anat Rubio PT [facility]  
4/19/2019  2:30 PM Haritha Martínez, NORAH [facility]

## 2019-04-12 ENCOUNTER — TELEPHONE (OUTPATIENT)
Dept: ORTHOPEDIC SURGERY | Facility: CLINIC | Age: 39
End: 2019-04-12

## 2019-04-12 NOTE — TELEPHONE ENCOUNTER
Spoke to Dr Randi Hannah states the patient can make an appt to be seen when she is ready to go back to work. Called and made patient aware. Patient thanked writer for the call.

## 2019-04-12 NOTE — TELEPHONE ENCOUNTER
Patient called in states that she has an appt today for f/u after  work hardening program, patient states that she just started yesterday and would like to know when she needs to come in. Please contact patient at  [phone].

## 2019-04-16 ENCOUNTER — HOSPITAL ENCOUNTER (OUTPATIENT)
Dept: PHYSICAL THERAPY | Age: 39
Discharge: HOME | End: 2019-04-16
Payer: OTHER GOVERNMENT

## 2019-04-16 PROCEDURE — 97545 WORK HARDENING: CPT

## 2019-04-16 NOTE — PROGRESS NOTES
PT DAILY TREATMENT NOTE 10-18 Patient Name: Jerrica Miller Date:2019 : 1980 [x]  Patient  Verified Payor: DEPARTMENT OF LABOR / Plan: [address] [place] [address] Avenue / Product Type: Workers Comp / In time:234  Out time:424 Total Treatment Time (min): 110 Visit #: 3 of 13 Medicare/BCBS Only Total Timed Codes (min):  0 1:1 Treatment Time:  0 Treatment Area: Strain of muscle, fascia and tendon of lower back, subsequent encounter [S39.249D] SUBJECTIVE Pain Level (0-10 scale): 0 Any medication changes, allergies to medications, adverse drug reactions, diagnosis change, or new procedure performed?: [x] No    [] Yes (see summary sheet for update) Subjective functional status/changes:   [] No changes reported Doing alright OBJECTIVE Modality rationale:    
Min Type Additional Details  
 [] Estim:  []Unatt       []IFC  []Premod []Other:  []w/ice   []w/heat Position: Location:  
 [] Estim: []Att    []TENS instruct  []NMES []Other:  []w/US   []w/ice   []w/heat Position: Location:  
 []  Traction: [] Cervical       []Lumbar 
                     [] Prone          []Supine []Intermittent   []Continuous Lbs: 
[] before manual 
[] after manual  
 []  Ultrasound: []Continuous   [] Pulsed []1MHz   []3MHz W/cm2: 
Location:  
 []  Iontophoresis with dexamethasone Location: [] Take home patch  
[] In clinic  
 []  Ice     []  heat 
[]  Ice massage 
[]  Laser  
[]  Anodyne Position: Location:  
 []  Laser with stim 
[]  Other:  Position: Location:  
 []  Vasopneumatic Device Pressure:       [] lo [] med [] hi  
Temperature: [] lo [] med [] hi  
[] Skin assessment post-treatment:  []intact []redness- no adverse reaction 
  []redness  adverse reaction:  
 
  min []Eval                  []Re-Eval  
 
 
55 min Therapeutic Exercise:  [x] See flow sheet :  
 Rationale: increase ROM, increase strength, improve coordination and improve balance to improve the patients ability to aid with increase tolerance to ADLS and activities 55 min Therapeutic Activity:  [x]  See flow sheet :  
Rationale: increase ROM, increase strength, improve coordination and improve balance  to improve the patients ability to aid with increase tolerance to ADLs and activities 
  
 min Neuromuscular Re-education:  []  See flow sheet :  
Rationale:   to improve the patients ability to  
 min Manual Therapy:    
Rationale:  to  
 
 min Gait Training:  ___ feet with ___ device on level surfaces with ___ level of assist  
Rationale: With 
 [] TE 
 [] TA 
 [] neuro 
 [] other: Patient Education: [x] Review HEP [] Progressed/Changed HEP based on:  
[] positioning   [] body mechanics   [] transfers   [] heat/ice application   
[] other:   
 
Other Objective/Functional Measures: VC exercises and technique    Decreased tolerance to stoop and lift and front carry- decreased to 10# (empty crate) Pain Level (0-10 scale) post treatment: 2 
 
ASSESSMENT/Changes in Function: tolerated well Patient will continue to benefit from skilled PT services to modify and progress therapeutic interventions, address ROM deficits, address strength deficits, analyze and address soft tissue restrictions, analyze and cue movement patterns, analyze and modify body mechanics/ergonomics, assess and modify postural abnormalities and instruct in home and community integration to attain remaining goals. [x]  See Plan of Care [x]  See progress note/recertification 
[]  See Discharge Summary Progress towards goals / Updated goals: NEW Goals: to be achieved in 4 weeks: for a total of 12 visits, 3x/week at 2 hours each 1.  Patient will demonstrate increased pain of no more than 2 points on Verbal Analog Scale lasting no more than 48 hours after increased resistance on lifting of 50lbs at 5 sets of 3 repetitions over a two hour period.  (Eval: 26lbs). 2. Patient will demonstrate ability to stoop and lift 65lbs (eval 50lbs) for 5 sets of 3 repetitions over a 2 hour period without difficulty CURRENT: 10# 4/16/19 3. Patient will improve squatting tolerance time to 50lbs (Eval: 20lbs) without increased pain.  25#   2 sets  10  4/11/19 CURRENT 25# 15 reps 4/16/19 
4. Patient will demonstrate ability to carry 75lbs 100ft for 5 repetitions over a 2 hour period. CURRENT 10# 100 feet total  4/16/19 
5. 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 6. Patient will be able to step up on 20\" box carrying 50lbs 5 repetitions over a 2 hour period without lasting increase of pain.  
  
 
 
 
PLAN [x]  Upgrade activities as tolerated     [x]  Continue plan of care 
[]  Update interventions per flow sheet      
[]  Discharge due to:_ 
[]  Other:_ Earney Rice, PT 4/16/2019  2:27 PM 
 
Future Appointments Date Time Provider Diogo Katz 4/16/2019  3:00 PM Geoff Walton, PT [facility]  
4/19/2019  2:30 PM Sarika Martínez, PTA [facility]

## 2019-04-19 ENCOUNTER — APPOINTMENT (OUTPATIENT)
Dept: PHYSICAL THERAPY | Age: 39
End: 2019-04-19
Payer: OTHER GOVERNMENT

## 2019-04-19 ENCOUNTER — HOSPITAL ENCOUNTER (EMERGENCY)
Age: 39
Discharge: HOME | End: 2019-04-19
Attending: EMERGENCY MEDICINE
Payer: MEDICAID

## 2019-04-19 VITALS
WEIGHT: 200 LBS | HEART RATE: 96 BPM | RESPIRATION RATE: 14 BRPM | HEIGHT: 66 IN | OXYGEN SATURATION: 99 % | SYSTOLIC BLOOD PRESSURE: 120 MMHG | DIASTOLIC BLOOD PRESSURE: 82 MMHG | TEMPERATURE: 100 F | BODY MASS INDEX: 32.14 KG/M2

## 2019-04-19 DIAGNOSIS — R50.9 FEVER, UNSPECIFIED FEVER CAUSE: ICD-10-CM

## 2019-04-19 DIAGNOSIS — N30.00 ACUTE CYSTITIS WITHOUT HEMATURIA: Primary | ICD-10-CM

## 2019-04-19 LAB
APPEARANCE UR: ABNORMAL
BACTERIA URNS QL MICRO: ABNORMAL /HPF
BILIRUB UR QL: NEGATIVE
COLOR UR: ABNORMAL
EPITH CASTS URNS QL MICRO: ABNORMAL /LPF (ref 0–5)
FLUAV AG NPH QL IA: NEGATIVE
FLUBV AG NOSE QL IA: NEGATIVE
GLUCOSE UR STRIP.AUTO-MCNC: NEGATIVE MG/DL
HGB UR QL STRIP: NEGATIVE
KETONES UR QL STRIP.AUTO: ABNORMAL MG/DL
LEUKOCYTE ESTERASE UR QL STRIP.AUTO: ABNORMAL
NITRITE UR QL STRIP.AUTO: NEGATIVE
PH UR STRIP: 7 [PH] (ref 5–8)
PROT UR STRIP-MCNC: 30 MG/DL
RBC #/AREA URNS HPF: ABNORMAL /HPF (ref 0–5)
SP GR UR REFRACTOMETRY: >1.03 (ref 1–1.03)
UROBILINOGEN UR QL STRIP.AUTO: 1 EU/DL (ref 0.2–1)
WBC URNS QL MICRO: ABNORMAL /HPF (ref 0–4)

## 2019-04-19 PROCEDURE — 87804 INFLUENZA ASSAY W/OPTIC: CPT

## 2019-04-19 PROCEDURE — 74011250637 HC RX REV CODE- 250/637: Performed by: PHYSICIAN ASSISTANT

## 2019-04-19 PROCEDURE — 99282 EMERGENCY DEPT VISIT SF MDM: CPT

## 2019-04-19 PROCEDURE — 81001 URINALYSIS AUTO W/SCOPE: CPT

## 2019-04-19 PROCEDURE — 87086 URINE CULTURE/COLONY COUNT: CPT

## 2019-04-19 RX ORDER — ACETAMINOPHEN 325 MG/1
650 TABLET ORAL
Status: COMPLETED | OUTPATIENT
Start: 2019-04-19 | End: 2019-04-19

## 2019-04-19 RX ORDER — ACETAMINOPHEN 325 MG/1
650 TABLET ORAL
Qty: 20 TAB | Refills: 0 | Status: SHIPPED | OUTPATIENT
Start: 2019-04-19 | End: 2019-06-16

## 2019-04-19 RX ORDER — CEPHALEXIN 500 MG/1
500 CAPSULE ORAL 2 TIMES DAILY
Qty: 14 CAP | Refills: 0 | Status: SHIPPED | OUTPATIENT
Start: 2019-04-19 | End: 2019-04-26

## 2019-04-19 RX ADMIN — ACETAMINOPHEN 650 MG: 325 TABLET ORAL at 16:53

## 2019-04-19 NOTE — ED PROVIDER NOTES
EMERGENCY DEPARTMENT HISTORY AND PHYSICAL EXAM 
 
4:44 PM 
 
 
Date: 4/19/2019 Patient Name: Dayron Busby History of Presenting Illness Chief Complaint Patient presents with  Generalized Body Aches History Provided By: Patient Chief Complaint: body aches, chills, fever, nausea Duration:  Hours Timing:  Acute Location: diffuse body aches Quality: Aching Severity: Mild Modifying Factors: none Associated Symptoms: denies any other associated signs or symptoms Additional History (Context): Dayron Busby is a 45 y.o. female with No significant past medical history, 10 weeks pregnant who presents with complaint of fever, chills, body aches, and nausea for 1 day. Patient notes she last took Tylenol at 1 PM.    Did not receive a flu shot this year. Denies chest pain, shortness of breath, cough, sore throat, abdominal pain, vaginal bleeding, dysuria, hematuria. Denies sick contacts. Has been evaluated by obstetrician for this pregnancy. PCP: Aram Ray MD 
 
Current Facility-Administered Medications Medication Dose Route Frequency Provider Last Rate Last Dose  acetaminophen (TYLENOL) tablet 650 mg  650 mg Oral NOW Michaela Flaherty PA Current Outpatient Medications Medication Sig Dispense Refill  cephALEXin (KEFLEX) 500 mg capsule Take 1 Cap by mouth two (2) times a day for 7 days. 14 Cap 0  
 acetaminophen (TYLENOL) 325 mg tablet Take 2 Tabs by mouth every six (6) hours as needed for Pain. 20 Tab 0  
 dicyclomine (BENTYL) 20 mg tablet Take 20 mg by mouth every six (6) hours as needed.  BUDESONIDE PO Take 3 mg by mouth three (3) times daily.  MESALAMINE (PENTASA PO) Take  by mouth two (2) times a day. Past History Past Medical History: 
Past Medical History:  
Diagnosis Date  Diabetes ([address] Pt. states pre-diabetic  Female bladder prolapse  Thyroid disease Pt. states they are watching her levels.  Unspecified urinary incontinence Past Surgical History: 
Past Surgical History:  
Procedure Laterality Date  COLONOSCOPY N/A 9/1/2017 COLONOSCOPY, DIAGNOSTIC /c Bx performed by Lilian Napoles MD at [facility] 42 Pt with 2 previous abortions, and multiple surgeries to release adhesions Family History: 
Family History Problem Relation Age of Onset  Hypertension Father  Heart Disease Father  Hypertension Paternal Grandfather  Hypertension Mother  Hypertension Brother  Cancer Neg Hx  Diabetes Neg Hx Social History: 
Social History Tobacco Use  Smoking status: Never Smoker  Smokeless tobacco: Never Used Substance Use Topics  Alcohol use: Yes Comment: occassionally  Drug use: No  
 
 
Allergies: 
No Known Allergies Review of Systems Review of Systems Constitutional: Positive for chills and fever. Respiratory: Negative for shortness of breath. Cardiovascular: Negative for chest pain. Gastrointestinal: Positive for nausea. Negative for abdominal pain and vomiting. Musculoskeletal: Positive for myalgias. Skin: Negative for rash. Neurological: Negative for weakness. All other systems reviewed and are negative. Physical Exam  
 
Visit Vitals /82 (BP 1 Location: Left arm, BP Patient Position: At rest) Pulse 96 Temp 100 °F (37.8 °C) Resp 14 Ht 5' 6\" (1.676 m) Wt 90.7 kg (200 lb) SpO2 99% BMI 32.28 kg/m² Physical Exam  
Constitutional: She appears well-developed and well-nourished. No distress. HENT:  
Head: Normocephalic and atraumatic. Right Ear: Tympanic membrane, external ear and ear canal normal.  
Left Ear: Tympanic membrane, external ear and ear canal normal.  
Nose: Nose normal.  
Mouth/Throat: Oropharynx is clear and moist. No oropharyngeal exudate. Dry mucous membranes Neck: Normal range of motion. Neck supple. No nuchal rigidity Cardiovascular: Normal rate, regular rhythm, normal heart sounds and intact distal pulses. Exam reveals no gallop and no friction rub. No murmur heard. Pulmonary/Chest: Effort normal and breath sounds normal. No stridor. No respiratory distress. She has no wheezes. She has no rales. Abdominal: Soft. Bowel sounds are normal. She exhibits no distension and no mass. There is no tenderness. There is no rebound and no guarding. Musculoskeletal: Normal range of motion. Lymphadenopathy:  
  She has no cervical adenopathy. Neurological: She is alert. Skin: Skin is warm. No rash noted. She is not diaphoretic. Nursing note and vitals reviewed. Diagnostic Study Results Labs - Recent Results (from the past 12 hour(s)) URINALYSIS W/ RFLX MICROSCOPIC Collection Time: 04/19/19  3:05 PM  
Result Value Ref Range Color DARK YELLOW Appearance TURBID Specific gravity >1.030 (H) 1.005 - 1.030  
 pH (UA) 7.0 5.0 - 8.0 Protein 30 (A) NEG mg/dL Glucose NEGATIVE  NEG mg/dL Ketone TRACE (A) NEG mg/dL Bilirubin NEGATIVE  NEG Blood NEGATIVE  NEG Urobilinogen 1.0 0.2 - 1.0 EU/dL Nitrites NEGATIVE  NEG Leukocyte Esterase MODERATE (A) NEG URINE MICROSCOPIC ONLY Collection Time: 04/19/19  3:05 PM  
Result Value Ref Range WBC 3 to 5 0 - 4 /hpf  
 RBC NONE 0 - 5 /hpf Epithelial cells 4+ 0 - 5 /lpf Bacteria 4+ (A) NEG /hpf INFLUENZA A & B AG (RAPID TEST) Collection Time: 04/19/19  3:09 PM  
Result Value Ref Range Influenza A Antigen NEGATIVE  NEG Influenza B Antigen NEGATIVE  NEG Radiologic Studies - No orders to display Medical Decision Making I am the first provider for this patient. I reviewed the vital signs, available nursing notes, past medical history, past surgical history, family history and social history. Vital Signs-Reviewed the patient's vital signs. Records Reviewed: Nursing Notes and Old Medical Records (Time of Review: 4:44 PM) 
 
ED Course: Progress Notes, Reevaluation, and Consults: 
4:44 PM  Reviewed results with patient and . Discussed need for close outpatient follow-up. Discussed strict return precautions, including fever that does not reduce with medication, vomiting, abdominal pain, or any other medical concerns. Pt in no distress, tolerating PO. Provider Notes (Medical Decision Making): 70-year-old female, 10 weeks pregnant, who presents due to fever, chills, body aches. Antipyretic administered in the ED. Patient is nontoxic-appearing, looks well. No evidence of otitis media, strep pharyngitis, pneumonia, influenza. UA with bacteriuria, will send culture and treat as patient is pregnant. No abdominal tenderness, vaginal bleeding, or any other medical concerns. Patient is stable for discharge with symptomatic management and close outpatient follow-up. Diagnosis Clinical Impression: 1. Acute cystitis without hematuria 2. Fever, unspecified fever cause Disposition: home Follow-up Information Follow up With Specialties Details Why Contact Info [facility] EMERGENCY DEPT Emergency Medicine  If symptoms worsen [phone] [id number] 
[phone] Maria Elena Winkler MD Family Practice In 2 days  [address] [place] [id number] [phone] Patient's Medications Start Taking ACETAMINOPHEN (TYLENOL) 325 MG TABLET    Take 2 Tabs by mouth every six (6) hours as needed for Pain. CEPHALEXIN (KEFLEX) 500 MG CAPSULE    Take 1 Cap by mouth two (2) times a day for 7 days. Continue Taking BUDESONIDE PO    Take 3 mg by mouth three (3) times daily. DICYCLOMINE (BENTYL) 20 MG TABLET    Take 20 mg by mouth every six (6) hours as needed. MESALAMINE (PENTASA PO)    Take  by mouth two (2) times a day. These Medications have changed No medications on file Stop Taking CYCLOBENZAPRINE (FLEXERIL) 10 MG TABLET    Take 1 Tab by mouth three (3) times daily as needed for Muscle Spasm(s). IBUPROFEN (MOTRIN) 600 MG TABLET    ibuprofen 600 mg tablet TAKE 1 TABLET BY MOUTH EVERY 8 HOURS  
 LORAZEPAM (ATIVAN) 1 MG TABLET    1-2 tab PO 20 mins Prior to procedure MELOXICAM (MOBIC) 7.5 MG TABLET    Take 1 Tab by mouth daily. Dictation disclaimer:  Please note that this dictation was completed with [employer], the [employer] voice recognition software. Quite often unanticipated grammatical, syntax, homophones, and other interpretive errors are inadvertently transcribed by the computer software. Please disregard these errors. Please excuse any errors that have escaped final proofreading.

## 2019-04-19 NOTE — ED TRIAGE NOTES
The patient presents for evaluation of generalized body aches, chills, and diarrhea that began today.

## 2019-04-19 NOTE — DISCHARGE INSTRUCTIONS
Patient Education   Take medication as prescribed. Follow-up with your primary care physician in 2 days for reassessment. Bring the results from this visit with you for their review. Return to the ED immediately for any new, worsening, or persistent symptoms, including fever, vomiting, or any other medical concerns. Learning About Fever  What is a fever? A fever is a high body temperature. It's one way your body fights being sick. A fever shows that the body is responding to infection or other illnesses, both minor and severe. A fever is a symptom, not an illness by itself. A fever can be a sign that you are ill, but most fevers are not caused by a serious problem. You may have a fever with a minor illness, such as a cold. But sometimes a very serious infection may cause little or no fever. It is important to look at other symptoms, other conditions you have, and how you feel in general. In children, notice how they act and see what symptoms they complain of. What is a normal body temperature? A normal body temperature is about 98. 6ºF. Some people have a normal temperature that is a little higher or a little lower than this. Your temperature may be a little lower in the morning than it is later in the day. It may go up during hot weather or when you exercise, wear heavy clothes, or take a hot bath. Your temperature may also be different depending on how you take it. A temperature taken in the mouth (oral) or under the arm may be a little lower than your core temperature (rectal). What is a fever temperature? A core temperature of 100.4°F or above is considered a fever. What can cause a fever? A fever may be caused by:  · Infections. This is the most common cause of a fever. Examples of infections that can cause a fever include the flu, a kidney infection, or pneumonia. · Some medicines. · Severe trauma or injury, such as a heart attack, stroke, heatstroke, or burns.   · Other medical conditions, such as arthritis and some cancers. How can you treat a fever at home? · Ask your doctor if you can take an over-the-counter pain medicine, such as acetaminophen (Tylenol), ibuprofen (Advil, Motrin), or naproxen (Aleve). Be safe with medicines. Read and follow all instructions on the label. · To prevent dehydration, drink plenty of fluids. Choose water and other caffeine-free clear liquids until you feel better. If you have kidney, heart, or liver disease and have to limit fluids, talk with your doctor before you increase the amount of fluids you drink. Follow-up care is a key part of your treatment and safety. Be sure to make and go to all appointments, and call your doctor if you are having problems. It's also a good idea to know your test results and keep a list of the medicines you take. Where can you learn more? Go to http://isadoraCaptiveMotioncindy.info/. Enter H015 in the search box to learn more about \"Learning About Fever. \"  Current as of: September 23, 2018  Content Version: 11.9  © 7313-3150 [employer]. Care instructions adapted under license by [employer] (which disclaims liability or warranty for this information). If you have questions about a medical condition or this instruction, always ask your healthcare professional. [place] [address] any warranty or liability for your use of this information. Patient Education        Urinary Tract Infection in Women: Care Instructions  Your Care Instructions    A urinary tract infection, or UTI, is a general term for an infection anywhere between the kidneys and the urethra (where urine comes out). Most UTIs are bladder infections. They often cause pain or burning when you urinate. UTIs are caused by bacteria and can be cured with antibiotics. Be sure to complete your treatment so that the infection goes away. Follow-up care is a key part of your treatment and safety.  Be sure to make and go to all appointments, and call your doctor if you are having problems. It's also a good idea to know your test results and keep a list of the medicines you take. How can you care for yourself at home? · Take your antibiotics as directed. Do not stop taking them just because you feel better. You need to take the full course of antibiotics. · Drink extra water and other fluids for the next day or two. This may help wash out the bacteria that are causing the infection. (If you have kidney, heart, or liver disease and have to limit fluids, talk with your doctor before you increase your fluid intake.)  · Avoid drinks that are carbonated or have caffeine. They can irritate the bladder. · Urinate often. Try to empty your bladder each time. · To relieve pain, take a hot bath or lay a heating pad set on low over your lower belly or genital area. Never go to sleep with a heating pad in place. To prevent UTIs  · Drink plenty of water each day. This helps you urinate often, which clears bacteria from your system. (If you have kidney, heart, or liver disease and have to limit fluids, talk with your doctor before you increase your fluid intake.)  · Urinate when you need to. · Urinate right after you have sex. · Change sanitary pads often. · Avoid douches, bubble baths, feminine hygiene sprays, and other feminine hygiene products that have deodorants. · After going to the bathroom, wipe from front to back. When should you call for help? Call your doctor now or seek immediate medical care if:    · Symptoms such as fever, chills, nausea, or vomiting get worse or appear for the first time.     · You have new pain in your back just below your rib cage.  This is called flank pain.     · There is new blood or pus in your urine.     · You have any problems with your antibiotic medicine.    Watch closely for changes in your health, and be sure to contact your doctor if:    · You are not getting better after taking an antibiotic for 2 days.     · Your symptoms go away but then come back. Where can you learn more? Go to http://isadora-cindy.info/. Enter S786 in the search box to learn more about \"Urinary Tract Infection in Women: Care Instructions. \"  Current as of: March 20, 2018  Content Version: 11.9  © 2267-4208 [employer]. Care instructions adapted under license by [employer] (which disclaims liability or warranty for this information). If you have questions about a medical condition or this instruction, always ask your healthcare professional. [address] any warranty or liability for your use of this information.

## 2019-04-20 LAB
BACTERIA SPEC CULT: NORMAL
SERVICE CMNT-IMP: NORMAL

## 2019-04-25 ENCOUNTER — HOSPITAL ENCOUNTER (OUTPATIENT)
Dept: PHYSICAL THERAPY | Age: 39
Discharge: HOME | End: 2019-04-25
Payer: OTHER GOVERNMENT

## 2019-04-25 PROCEDURE — 97545 WORK HARDENING: CPT

## 2019-04-25 NOTE — PROGRESS NOTES
PT DAILY TREATMENT NOTE 10-18 Patient Name: Joseph Cedillo Date:2019 : 1980 [x]  Patient  Verified Payor: DEPARTMENT OF LABOR / Plan: [address] [place] [address] Avenue / Product Type: Workers Comp / In time: 3:00  Out time:4:40 Total Treatment Time (min): 100 Visit #: 4 of 13 Medicare/BCBS Only Total Timed Codes (min):   1:1 Treatment Time:    
 
 
Treatment Area: Strain of muscle, fascia and tendon of lower back, subsequent encounter [Y10.186Q] SUBJECTIVE Pain Level (0-10 scale): 0 Any medication changes, allergies to medications, adverse drug reactions, diagnosis change, or new procedure performed?: [x] No    [] Yes (see summary sheet for update) Subjective functional status/changes:   [] No changes reported No pain OBJECTIVE Modality rationale: decrease edema, decrease inflammation, decrease pain and increase tissue extensibility to improve the patients ability to perform ADL Min Type Additional Details  
 [] Estim:  []Unatt       []IFC  []Premod []Other:  []w/ice   []w/heat Position: Location:  
 [] Estim: []Att    []TENS instruct  []NMES []Other:  []w/US   []w/ice   []w/heat Position: Location:  
 []  Traction: [] Cervical       []Lumbar 
                     [] Prone          []Supine []Intermittent   []Continuous Lbs: 
[] before manual 
[] after manual  
 []  Ultrasound: []Continuous   [] Pulsed []1MHz   []3MHz W/cm2: 
Location:  
 []  Iontophoresis with dexamethasone Location: [] Take home patch  
[] In clinic  
 []  Ice     []  heat 
[]  Ice massage 
[]  Laser  
[]  Anodyne Position: Location:  
 []  Laser with stim 
[]  Other:  Position: Location:  
 []  Vasopneumatic Device Pressure:       [] lo [] med [] hi  
Temperature: [] lo [] med [] hi  
[x] Skin assessment post-treatment:  [x]intact []redness- no adverse reaction 
  []redness  adverse reaction: min []Eval                  []Re-Eval  
 
 
100 min Therapeutic Exercise:  [] See flow sheet :  
Rationale: increase ROM and increase strength to improve the patients ability to perform ADL  
 
 min Therapeutic Activity:  []  See flow sheet :  
Rationale:   to improve the patients ability to  
  
 min Neuromuscular Re-education:  []  See flow sheet :  
Rationale:   to improve the patients ability to  
 
 min Manual Therapy:    
Rationale: decrease pain, increase ROM, increase tissue extensibility and decrease edema  to perform ADL  
 
 min Gait Training:  ___ feet with ___ device on level surfaces with ___ level of assist  
Rationale: With 
 [x] TE 
 [] TA 
 [] neuro 
 [] other: Patient Education: [x] Review HEP [] Progressed/Changed HEP based on:  
[] positioning   [] body mechanics   [] transfers   [] heat/ice application   
[] other:   
 
Other Objective/Functional Measures:  Decreased  Weights  For  Sled pull/push  Due  To weights  Were  Too much    Cues  With  Stoop and  Lift Pain Level (0-10 scale) post treatment:  
 
ASSESSMENT/Changes in Function: Overall  Fair  Response  To remaining  There ex. Patient will continue to benefit from skilled PT services to address functional mobility deficits, address ROM deficits, address strength deficits, analyze and address soft tissue restrictions, analyze and cue movement patterns and instruct in home and community integration to attain remaining goals. [x]  See Plan of Care 
[]  See progress note/recertification 
[]  See Discharge Summary Progress towards goals / Updated goals: NEW Goals: to be achieved in 4 weeks: for a total of 12 visits, 3x/week at 2 hours each 1. Patient will demonstrate increased pain of no more than 2 points on Verbal Analog Scale lasting no more than 48 hours after increased resistance on lifting of 50lbs at 5 sets of 3 repetitions over a two hour period.  (Eval: 26lbs). 2. Patient will demonstrate ability to stoop and lift 65lbs (eval 50lbs) for 5 sets of 3 repetitions over a 2 hour period without difficulty CURRENT: 10# 4/25/19 3. Patient will improve squatting tolerance time to 50lbs (Eval: 20lbs) without increased pain.  25#   2 sets  10  4/11/19 CURRENT 25# 15 reps 4/16/19 
4. Patient will demonstrate ability to carry 75lbs 100ft for 5 repetitions over a 2 hour period. CURRENT 10# 100 feet total  4/25/19 5. 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 6. Patient will be able to step up on 20\" box carrying 50lbs 5 repetitions over a 2 hour period without lasting increase of pain.  
  
 
 
 
PLAN 
[]  Upgrade activities as tolerated     []  Continue plan of care 
[]  Update interventions per flow sheet      
[]  Discharge due to:_ 
[]  Other:_   
 
Huy Taveras PTA 4/25/2019  3:08 PM 
 
Future Appointments Date Time Provider Diogo Katz 4/26/2019 10:30 AM Sarika Martínez PTA [id number]

## 2019-04-26 ENCOUNTER — HOSPITAL ENCOUNTER (OUTPATIENT)
Dept: PHYSICAL THERAPY | Age: 39
Discharge: HOME | End: 2019-04-26
Payer: OTHER GOVERNMENT

## 2019-04-26 PROCEDURE — 97545 WORK HARDENING: CPT

## 2019-05-02 ENCOUNTER — HOSPITAL ENCOUNTER (OUTPATIENT)
Dept: PHYSICAL THERAPY | Age: 39
Discharge: HOME | End: 2019-05-02
Payer: OTHER GOVERNMENT

## 2019-05-02 PROCEDURE — 97545 WORK HARDENING: CPT

## 2019-05-02 NOTE — PROGRESS NOTES
PT DAILY TREATMENT NOTE 10-18 Patient Name: Tabby Koch Date:2019 : 1980 [x]  Patient  Verified Payor: DEPARTMENT OF LABOR / Plan: [address] [place] [address] Avenue / Product Type: Workers Comp / In time:3:30   Out time:5:20 Total Treatment Time (min):  
Visit #: 5 of 13 Medicare/BCBS Only Total Timed Codes (min):   1:1 Treatment Time:    
 
 
Treatment Area: Strain of muscle, fascia and tendon of lower back, subsequent encounter [X82.072H] SUBJECTIVE Pain Level (0-10 scale): 1 Any medication changes, allergies to medications, adverse drug reactions, diagnosis change, or new procedure performed?: [x] No    [] Yes (see summary sheet for update) Subjective functional status/changes:   [] No changes reported No pain. OBJECTIVE Modality rationale: decrease edema, decrease inflammation, decrease pain and increase tissue extensibility to improve the patients ability to perform ADL Min Type Additional Details  
 [] Estim:  []Unatt       []IFC  []Premod []Other:  []w/ice   []w/heat Position: Location:  
 [] Estim: []Att    []TENS instruct  []NMES []Other:  []w/US   []w/ice   []w/heat Position: Location:  
 []  Traction: [] Cervical       []Lumbar 
                     [] Prone          []Supine []Intermittent   []Continuous Lbs: 
[] before manual 
[] after manual  
 []  Ultrasound: []Continuous   [] Pulsed []1MHz   []3MHz W/cm2: 
Location:  
 []  Iontophoresis with dexamethasone Location: [] Take home patch  
[] In clinic  
 []  Ice     []  heat 
[]  Ice massage 
[]  Laser  
[]  Anodyne Position: Location:  
 []  Laser with stim 
[]  Other:  Position: Location:  
 []  Vasopneumatic Device Pressure:       [] lo [] med [] hi  
Temperature: [] lo [] med [] hi  
[x] Skin assessment post-treatment:  [x]intact []redness- no adverse reaction 
  []redness  adverse reaction: min []Eval                  []Re-Eval  
 
 
120 min Therapeutic Exercise:  [x] See flow sheet :  
Rationale: increase ROM and increase strength to improve the patients ability to perform ADL  
 
 min Therapeutic Activity:  []  See flow sheet :  
Rationale:   to improve the patients ability to  
  
 min Neuromuscular Re-education:  []  See flow sheet :  
Rationale:   to improve the patients ability to  
 
 min Manual Therapy:    
Rationale: decrease pain, increase ROM, increase tissue extensibility and decrease edema  to perform ADL  
 
 min Gait Training:  ___ feet with ___ device on level surfaces with ___ level of assist  
Rationale: With 
 [x] TE 
 [] TA 
 [] neuro 
 [] other: Patient Education: [x] Review HEP [] Progressed/Changed HEP based on:  
[] positioning   [] body mechanics   [] transfers   [] heat/ice application   
[] other:   
 
Other Objective/Functional Measures:  
Completed   Each activity/strengthening  There ex  Fairly  well Pain Level (0-10 scale) post treatment:2  
 
ASSESSMENT/Changes in Function:  
 
Patient will continue to benefit from skilled PT services to address functional mobility deficits, address ROM deficits, address strength deficits, analyze and address soft tissue restrictions and analyze and cue movement patterns to attain remaining goals. [x]  See Plan of Care 
[]  See progress note/recertification 
[]  See Discharge Summary Progress towards goals / Updated goals: NEW Goals: to be achieved in 4 weeks: for a total of 12 visits, 3x/week at 2 hours each 1. Patient will demonstrate increased pain of no more than 2 points on Verbal Analog Scale lasting no more than 48 hours after increased resistance on lifting of 50lbs at 5 sets of 3 repetitions over a two hour period.  (Eval: 26lbs). 2. Patient will demonstrate ability to stoop and lift 65lbs (eval 50lbs) for 5 sets of 3 repetitions over a 2 hour period without difficulty CURRENT: 10# 4/25/19 3. Patient will improve squatting tolerance time to 50lbs (Eval: 20lbs) without increased pain.   CURRENT 25# 15 reps  5/2/19 4. Patient will demonstrate ability to carry 75lbs 100ft for 5 repetitions over a 2 hour period. [place] [address] 15# 100 feet total  5/2/19 5. 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 6. Patient will be able to step up on 20\" box carrying 50lbs 5 repetitions over a 2 hour period without lasting increase of pain.  PLAN [x]  Upgrade activities as tolerated     []  Continue plan of care 
[]  Update interventions per flow sheet      
[]  Discharge due to:_ 
[]  Other:_   
 
Sarika Martínez PTA 5/2/2019  3:40 PM 
 
Future Appointments Date Time Provider Diogo Katz 5/3/2019  8:00 AM Terrence Segal PTA [id number]  
5/21/2019  1:50 PM Nile Bonilla MD [address]

## 2019-05-03 ENCOUNTER — HOSPITAL ENCOUNTER (OUTPATIENT)
Dept: PHYSICAL THERAPY | Age: 39
Discharge: HOME | End: 2019-05-03
Payer: OTHER GOVERNMENT

## 2019-05-03 PROCEDURE — 97545 WORK HARDENING: CPT

## 2019-05-03 NOTE — PROGRESS NOTES
PT DAILY TREATMENT NOTE 10-18 Patient Name: Elliott Haque Date:5/3/2019 : 1980 [x]  Patient  Verified Payor: DEPARTMENT OF LABOR / Plan: [address] [place] [address] Avenue / Product Type: Workers Comp / In time: 2:00  Out time: 3:43 Total Treatment Time (min): 115 Visit #: 6 of 13 Medicare/BCBS Only Total Timed Codes (min):   1:1 Treatment Time:    
 
 
Treatment Area: Strain of muscle, fascia and tendon of lower back, subsequent encounter [X02.514Z] SUBJECTIVE Pain Level (0-10 scale): 1 Any medication changes, allergies to medications, adverse drug reactions, diagnosis change, or new procedure performed?: [x] No    [] Yes (see summary sheet for update) Subjective functional status/changes:   [] No changes reported Little  Pain. OBJECTIVE Modality rationale: decrease edema, decrease inflammation, decrease pain and increase tissue extensibility to improve the patients ability to perform ADL Min Type Additional Details  
 [] Estim:  []Unatt       []IFC  []Premod []Other:  []w/ice   []w/heat Position: Location:  
 [] Estim: []Att    []TENS instruct  []NMES []Other:  []w/US   []w/ice   []w/heat Position: Location:  
 []  Traction: [] Cervical       []Lumbar 
                     [] Prone          []Supine []Intermittent   []Continuous Lbs: 
[] before manual 
[] after manual  
 []  Ultrasound: []Continuous   [] Pulsed []1MHz   []3MHz W/cm2: 
Location:  
 []  Iontophoresis with dexamethasone Location: [] Take home patch  
[] In clinic  
 []  Ice     []  heat 
[]  Ice massage 
[]  Laser  
[]  Anodyne Position: Location:  
 []  Laser with stim 
[]  Other:  Position: Location:  
 []  Vasopneumatic Device Pressure:       [] lo [] med [] hi  
Temperature: [] lo [] med [] hi  
[x] Skin assessment post-treatment:  [x]intact []redness- no adverse reaction []redness  adverse reaction:  
 
 min []Eval                  []Re-Eval  
 
 
115 min Therapeutic Exercise:  [x] See flow sheet :  
Rationale: increase ROM and increase strength to improve the patients ability to perform  ADL 
 
 min Therapeutic Activity:  []  See flow sheet :  
Rationale:   to improve the patients ability to  
  
 min Neuromuscular Re-education:  []  See flow sheet :  
Rationale:   to improve the patients ability to  
 
 min Manual Therapy:    
Rationale: decrease pain, increase ROM, increase tissue extensibility and decrease edema  to perform ADL  
 
 min Gait Training:  ___ feet with ___ device on level surfaces with ___ level of assist  
Rationale: With 
 [x] TE 
 [] TA 
 [] neuro 
 [] other: Patient Education: [x] Review HEP [] Progressed/Changed HEP based on:  
[] positioning   [] body mechanics   [] transfers   [] heat/ice application   
[] other:   
 
Other Objective/Functional Measures:  
Pt  Was unable  To tolerate 35# Squat  hang Pain Level (0-10 scale) post treatment: 2 
 
ASSESSMENT/Changes in Function: Fair  Response  To remaining  Strengthening  There ex and  Activity. Patient will continue to benefit from skilled PT services to address functional mobility deficits, address ROM deficits, address strength deficits, analyze and address soft tissue restrictions, analyze and cue movement patterns and instruct in home and community integration to attain remaining goals. [x]  See Plan of Care 
[]  See progress note/recertification 
[]  See Discharge Summary Progress towards goals / Updated goals: NEW Goals: to be achieved in 4 weeks: for a total of 12 visits, 3x/week at 2 hours each 1. Patient will demonstrate increased pain of no more than 2 points on Verbal Analog Scale lasting no more than 48 hours after increased resistance on lifting of 50lbs at 5 sets of 3 repetitions over a two hour period.  (Eval: 26lbs). 2. Patient will demonstrate ability to stoop and lift 65lbs (eval 50lbs) for 5 sets of 3 repetitions over a 2 hour period without difficulty CURRENT: 10# 4/25/19 3. Patient will improve squatting tolerance time to 50lbs (Eval: 20lbs) without increased pain.   CURRENT 25# 15 reps  5/2/19 4. Patient will demonstrate ability to carry 75lbs 100ft for 5 repetitions over a 2 hour period. [place] [address] 15# 100 feet total  5/2/19 5. 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 6. Patient will be able to step up on 20\" box carrying 50lbs 5 repetitions over a 2 hour period without lasting increase of pain.  
  
 
 
 
PLAN [x]  Upgrade activities as tolerated     []  Continue plan of care 
[]  Update interventions per flow sheet      
[]  Discharge due to:_ 
[]  Other:_   
 
Sarika Martínez, NORAH 5/3/2019  3:43 PM 
 
Future Appointments Date Time Provider Diogo Katz 5/21/2019  1:50 PM Nile Bonilla MD [address]

## 2019-05-09 ENCOUNTER — HOSPITAL ENCOUNTER (OUTPATIENT)
Dept: PHYSICAL THERAPY | Age: 39
Discharge: HOME | End: 2019-05-09
Payer: OTHER GOVERNMENT

## 2019-05-09 PROCEDURE — 97545 WORK HARDENING: CPT

## 2019-05-09 NOTE — PROGRESS NOTES
In [facility] Eug[facility] [address] [place] [address], [address] [place], [address] [place] [address],[facility] 300 [phone] fax Physician Update [x] Progress Note  [] Discharge Summary Patient name: Bethann Severance Start of Care: 19 Referral source: Ji Shah MD : 1980 Medical/Treatment Diagnosis: Strain of muscle, fascia and tendon of lower back, subsequent encounter [S39.104D] Payor: DEPARTMENT OF LABOR / Plan: [employer] [facility] / Product Type: Workers Comp /  Onset Date:18 Prior Hospitalization: see medical history Provider#: [id number] Medications: Verified on Patient Summary List   
Comorbidities: thyroid 
 Prior Level of Function: works as [employer]. Has been out of work due to injury. Lives in [address]nd level apartment. Active mother of 3. Visits from Start of Care: 7    Missed Visits: 0 Status at Evaluation/Last Progress Note: Reeval and plan of care for work conditioning 19 Progress towards Goals: tolerating 2 hours of work conditioning well with activities including cardio, flexibility, strengthening and work simulation tasks as able to reproduce in the clinic. She reports pain 0 and her FOTO increased to 83. She demonstrates the potential to make further functional gains within a reasonable time frame. Goals: to be achieved in 13 total treatments: CONTINUE WITH EVAL GOALS 
 
ASSESSMENT/RECOMMENDATIONS: 
[x]Continue therapy per initial plan/protocol at a frequency of  2-3 x per week for 13 total treatments []Continue therapy with the following recommended changes:_____________________      _____________________________________________________________________ []Discontinue therapy progressing towards or have reached established goals []Discontinue therapy due to lack of appreciable progress towards goals []Discontinue therapy due to lack of attendance or compliance []Await Physician's recommendations/decisions regarding therapy []Other:________________________________________________________________ Thank you for this referral. Hamida Sanchez, PT 5/9/2019 10:31 AM 
NOTE TO PHYSICIAN:  PLEASE COMPLETE THE ORDERS BELOW AND  
FAX TO [facility]: [phone] If you are unable to process this request in 24 hours please contact our office: [phone] ? I have read the above report and request that my patient continue as recommended. ? I have read the above report and request that my patient continue therapy with the following changes/special instructions:_____________________________________ ? I have read the above report and request that my patient be discharged from therapy.  
 
[de-identified] Signature:____________Date:_________TIME:________ 
 
[employer], Date and Time must be completed for valid certification **

## 2019-05-09 NOTE — PROGRESS NOTES
PT DAILY TREATMENT NOTE 10-18 Patient Name: Jerrica Miller Date:2019 : 1980 [x]  Patient  Verified Payor: DEPARTMENT OF LABOR / Plan: [address] [place] [address] Avenue / Product Type: Workers Comp / In time:1005   Out time:1158 Total Treatment Time (min): 113 Visit #: 7 of 13 Medicare/BCBS Only Total Timed Codes (min):  0 1:1 Treatment Time:  0 Treatment Area: Strain of muscle, fascia and tendon of lower back, subsequent encounter [S39.863D] SUBJECTIVE Pain Level (0-10 scale): 0 Any medication changes, allergies to medications, adverse drug reactions, diagnosis change, or new procedure performed?: [x] No    [] Yes (see summary sheet for update) Subjective functional status/changes:   [] No changes reported \" I am not having any pain. \" OBJECTIVE Modality rationale: increase tissue extensibility to improve the patients ability to aid with post exercise recovery Min Type Additional Details  
 [] Estim:  []Unatt       []IFC  []Premod []Other:  []w/ice   []w/heat Position: Location:  
 [] Estim: []Att    []TENS instruct  []NMES []Other:  []w/US   []w/ice   []w/heat Position: Location:  
 []  Traction: [] Cervical       []Lumbar 
                     [] Prone          []Supine []Intermittent   []Continuous Lbs: 
[] before manual 
[] after manual  
 []  Ultrasound: []Continuous   [] Pulsed []1MHz   []3MHz W/cm2: 
Location:  
 []  Iontophoresis with dexamethasone Location: [] Take home patch  
[] In clinic  
10 [x]  Ice  post   []  heat 
[]  Ice massage 
[]  Laser  
[]  Anodyne Position: seated Location:B LS  
 []  Laser with stim 
[]  Other:  Position: Location:  
 []  Vasopneumatic Device Pressure:       [] lo [] med [] hi  
Temperature: [] lo [] med [] hi  
[] Skin assessment post-treatment:  []intact []redness- no adverse reaction []redness  adverse reaction:  
 
  min []Eval                  []Re-Eval  
 
 
60 min Therapeutic Exercise:  [x] See flow sheet :  
Rationale: increase ROM, increase strength, improve coordination, improve balance and increase proprioception to improve the patients ability to aid  With increase tolerance to ADLs and activities and work demands 53 min Therapeutic Activity:  []  See flow sheet :  
Rationale: increase ROM, increase strength, improve coordination, improve balance and increase proprioception to improve the patients ability to aid  With increase tolerance to ADLs and activities and work demands  
 min Neuromuscular Re-education:  []  See flow sheet :  
Rationale:   to improve the patients ability to  
 
 min Manual Therapy:    
Rationale:  to  
 
 min Gait Training:  ___ feet with ___ device on level surfaces with ___ level of assist  
Rationale: With 
 [x] TE 
 [x] TA 
 [] neuro [x] other: Patient Education: [x] Review HEP [] Progressed/Changed HEP based on:  
[] positioning   [] body mechanics   [] transfers   [] heat/ice application   
[x] other: POC    FOTO Other Objective/Functional Measures: Supervision VC, technique for work conditioning activities Pain Level (0-10 scale) post treatment: 0 
 
ASSESSMENT/Changes in Function: tolerated well. Patient will continue to benefit from skilled PT services to modify and progress therapeutic interventions, address ROM deficits, address strength deficits, analyze and address soft tissue restrictions, analyze and cue movement patterns, analyze and modify body mechanics/ergonomics, assess and modify postural abnormalities, address imbalance/dizziness and instruct in home and community integration to attain remaining goals. [x]  See Plan of Care [x]  See progress note/recertification 
[]  See Discharge Summary Progress towards goals / Updated goals: NEW Goals: to be achieved in 4 weeks: for a total of 12 visits, 3x/week at 2 hours each 1. Patient will demonstrate increased pain of no more than 2 points on Verbal Analog Scale lasting no more than 48 hours after increased resistance on lifting of 50lbs at 5 sets of 3 repetitions over a two hour period.  (Eval: 26lbs). CURRENT 25#  Lift and carry with 0 pain 5/9/19 2. Patient will demonstrate ability to stoop and lift 65lbs (eval 50lbs) for 5 sets of 3 repetitions over a 2 hour period without difficulty CURRENT: 10# 4/25/19 CURRENT not initiated 5/9/19 3. Patient will improve squatting tolerance time to 50lbs (Eval: 20lbs) without increased pain.   CURRENT 25# 15 reps  5/2/19  NA 5/9/19 4. Patient will demonstrate ability to carry 75lbs 100ft for 5 repetitions over a 2 hour period.  CURRENT 25# 100 feet total  5/9/19 
5. 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 CURRENT NA 5/9/19 6. Patient will be able to step up on 20\" box carrying 50lbs 5 repetitions over a 2 hour period without lasting increase of pain. CURRENT 10 inch 10X 5/9/19 
  
 
 
 
PLAN [x]  Upgrade activities as tolerated     [x]  Continue plan of care 
[]  Update interventions per flow sheet      
[]  Discharge due to:_ 
[x]  Other:_send 30 day note Jeff Esteban PT 5/9/2019  10:00 AM 
 
Future Appointments Date Time Provider Diogo Katz 5/10/2019  1:00 PM [facility]  
5/21/2019  1:50 PM Eve Cain MD [id number] [facility]

## 2019-05-10 ENCOUNTER — HOSPITAL ENCOUNTER (OUTPATIENT)
Dept: PHYSICAL THERAPY | Age: 39
Discharge: HOME | End: 2019-05-10
Payer: OTHER GOVERNMENT

## 2019-05-10 PROCEDURE — 97545 WORK HARDENING: CPT

## 2019-05-10 NOTE — PROGRESS NOTES
PT DAILY TREATMENT NOTE 10-18 Patient Name: Joe Pepe Date:5/10/2019 : 1980 [x]  Patient  Verified Payor: DEPARTMENT OF LABOR / Plan: [address] [place] [address] [address] / Product Type: Workers Comp / In time:104  Out time:300 Total Treatment Time (min): 116 Visit #: 8 of 13 Medicare/BCBS Only Total Timed Codes (min):  0 1:1 Treatment Time:  0 Treatment Area: Strain of muscle, fascia and tendon of lower back, subsequent encounter [S39.370D] SUBJECTIVE Pain Level (0-10 scale): 1 Any medication changes, allergies to medications, adverse drug reactions, diagnosis change, or new procedure performed?: [x] No    [] Yes (see summary sheet for update) Subjective functional status/changes:   [] No changes reported \"I am doing okay today\" OBJECTIVE Modality rationale:    
Min Type Additional Details  
 [] Estim:  []Unatt       []IFC  []Premod []Other:  []w/ice   []w/heat Position: Location:  
 [] Estim: []Att    []TENS instruct  []NMES []Other:  []w/US   []w/ice   []w/heat Position: Location:  
 []  Traction: [] Cervical       []Lumbar 
                     [] Prone          []Supine []Intermittent   []Continuous Lbs: 
[] before manual 
[] after manual  
 []  Ultrasound: []Continuous   [] Pulsed []1MHz   []3MHz W/cm2: 
Location:  
 []  Iontophoresis with dexamethasone Location: [] Take home patch  
[] In clinic  
 []  Ice     []  heat 
[]  Ice massage 
[]  Laser  
[]  Anodyne Position: Location:  
 []  Laser with stim 
[]  Other:  Position: Location:  
 []  Vasopneumatic Device Pressure:       [] lo [] med [] hi  
Temperature: [] lo [] med [] hi  
[] Skin assessment post-treatment:  []intact []redness- no adverse reaction 
  []redness  adverse reaction:  
 
  min []Eval                  []Re-Eval  
 
 
58 min Therapeutic Exercise:  [] See flow sheet :  
 Rationale: increase ROM, increase strength, improve coordination, improve balance and increase proprioception to improve the patients ability to aid with increase tolerance to ADLs, activities and work demands 58 min Therapeutic Activity:  []  See flow sheet :  
Rationale: increase ROM, increase strength, improve coordination, improve balance and increase proprioception  to improve the patients ability to aid with increase tolerance to ADLs and activities 
  
 min Neuromuscular Re-education:  []  See flow sheet :  
Rationale:   to improve the patients ability to  
 
 min Manual Therapy:    
Rationale: to  
 
 min Gait Training:  ___ feet with ___ device on level surfaces with ___ level of assist  
Rationale: With 
 [] TE 
 [] TA 
 [] neuro 
 [] other: Patient Education: [x] Review HEP [] Progressed/Changed HEP based on:  
[] positioning   [] body mechanics   [] transfers   [] heat/ice application   
[] other:   
 
Other Objective/Functional Measures: Added soila squats Pain Level (0-10 scale) post treatment:1 
 
ASSESSMENT/Changes in Function: tolerated well. Patient will continue to benefit from skilled PT services to modify and progress therapeutic interventions, address functional mobility deficits, address ROM deficits, address strength deficits, analyze and address soft tissue restrictions, analyze and cue movement patterns, analyze and modify body mechanics/ergonomics, assess and modify postural abnormalities and instruct in home and community integration to attain remaining goals. [x]  See Plan of Care [x]  See progress note/recertification 
[]  See Discharge Summary Progress towards goals / Updated goals: NEW Goals: to be achieved in 4 weeks: for a total of 12 visits, 3x/week at 2 hours each 1.  Patient will demonstrate increased pain of no more than 2 points on Verbal Analog Scale lasting no more than 48 hours after increased resistance on lifting of 50lbs at 5 sets of 3 repetitions over a two hour period.  (Eval: 26lbs). CURRENT 25#  Lift and carry with 0 pain 5/9/19 2. Patient will demonstrate ability to stoop and lift 65lbs (eval 50lbs) for 5 sets of 3 repetitions over a 2 hour period without difficulty CURRENT: 10# 4/25/19 CURRENT not initiated 5/9/19 3. Patient will improve squatting tolerance time to 50lbs (Eval: 20lbs) without increased pain.   CURRENT 25# 15 reps  5/2/19  NA 5/9/19 4. Patient will demonstrate ability to carry 75lbs 100ft for 5 repetitions over a 2 hour period.  CURRENT 25# 100 feet total  5/9/19 
5. 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 CURRENT NA 5/9/19 6. Patient will be able to step up on 20\" box carrying 50lbs 5 repetitions over a 2 hour period without lasting increase of pain. CURRENT 10 inch 10X 5/9/19 
  
 
 
 
PLAN [x]  Upgrade activities as tolerated     [x]  Continue plan of care 
[]  Update interventions per flow sheet      
[]  Discharge due to:_ 
[]  Other:_ Azalia Azar, PT 5/10/2019  1:07 PM 
 
Future Appointments Date Time Provider Diogo Katz 5/21/2019  1:50 PM Annelise Bonilla MD [facility] [address]

## 2019-05-15 ENCOUNTER — HOSPITAL ENCOUNTER (OUTPATIENT)
Dept: PHYSICAL THERAPY | Age: 39
Discharge: HOME | End: 2019-05-15
Payer: OTHER GOVERNMENT

## 2019-05-15 PROCEDURE — 97545 WORK HARDENING: CPT

## 2019-05-15 PROCEDURE — 97110 THERAPEUTIC EXERCISES: CPT

## 2019-05-15 PROCEDURE — 97530 THERAPEUTIC ACTIVITIES: CPT

## 2019-05-15 NOTE — PROGRESS NOTES
PT DAILY TREATMENT NOTE 10-18 
  
Patient Name: Marietta Bloom Date:5/10/2019 : 1980 [x]  Patient  Verified Payor: DEPARTMENT OF LABOR / Plan: [address] [place] [address] Avenue / Product Type: Workers Comp / In time:2:30  Out time:4:30 Total Treatment Time (min): 120 Visit #: 10 of 13 
  
   
Medicare/BCBS Only Total Timed Codes (min):  0 1:1 Treatment Time:  0  
  
  
Treatment Area: Strain of muscle, fascia and tendon of lower back, subsequent encounter [S39.762D] 
  
SUBJECTIVE Pain Level (0-10 scale): 0 Any medication changes, allergies to medications, adverse drug reactions, diagnosis change, or new procedure performed?: [x] No    [] Yes (see summary sheet for update) Subjective functional status/changes:   [] No changes reported I am very used to the program by now. It has been challenging, but good. 
  
OBJECTIVE 
  
      
Modality rationale:    
Min Type Additional Details   
  [] Estim:  []Unatt       []IFC  []Premod []Other:  []w/ice   []w/heat Position: Location:   
  [] Estim: []Att    []TENS instruct  []NMES []Other:  []w/US   []w/ice   []w/heat Position: Location:   
  []  Traction: [] Cervical       []Lumbar 
                     [] Prone          []Supine []Intermittent   []Continuous Lbs: 
[] before manual 
[] after manual   
  []  Ultrasound: []Continuous   [] Pulsed []1MHz   []3MHz W/cm2: 
Location:   
  []  Iontophoresis with dexamethasone Location: [] Take home patch  
[] In clinic   
  []  Ice     []  heat 
[]  Ice massage 
[]  Laser  
[]  Anodyne Position: Location:   
  []  Laser with stim 
[]  Other:  Position: Location:   
  []  Vasopneumatic Device Pressure:       [] lo [] med [] hi  
Temperature: [] lo [] med [] hi   
[] Skin assessment post-treatment:  []intact []redness- no adverse reaction 
  []redness  adverse reaction:  
  
 min []Eval                  []Re-Eval  
  
  
60 min Therapeutic Exercise:  [] See flow sheet :  
Rationale: increase ROM, increase strength, improve coordination, improve balance and increase proprioception to improve the patients ability to aid with increase tolerance to ADLs, activities and work demands 
  
60 min Therapeutic Activity:  []  See flow sheet :  
Rationale: increase ROM, increase strength, improve coordination, improve balance and increase proprioception  to improve the patients ability to aid with increase tolerance to ADLs and activities 
  
  min Neuromuscular Re-education:  []  See flow sheet :  
Rationale:   to improve the patients ability to  
  
  min Manual Therapy:    
Rationale: to  
  
  min Gait Training:  ___ feet with ___ device on level surfaces with ___ level of assist  
Rationale: With 
 [] TE 
 [] TA 
 [] neuro 
 [] other: Patient Education: [x] Review HEP [] Progressed/Changed HEP based on:  
[] positioning   [] body mechanics   [] transfers   [] heat/ice application   
[] other:   
  
Other Objective/Functional Measures: Decreased number or reps with the free weights for bicep curls 2/2 increased weakness and soreness of the left shoulder joint.      
  
Pain Level (0-10 scale) post treatment:left shoulder pain 4/10, decreased weight on left UE to 5# today and recommend icing for increased inflammation. 
  
ASSESSMENT/Changes in Function: Pt with good knowledge of current restrictions to avoid re-injury.  
  
Patient will continue to benefit from skilled PT services to modify and progress therapeutic interventions, address functional mobility deficits, address ROM deficits, address strength deficits, analyze and address soft tissue restrictions, analyze and cue movement patterns, assess and modify postural abnormalities and instruct in home and community integration to attain remaining goals. [x]  See Plan of Care [x]  See progress note/recertification 
[]  See Discharge Summary Progress towards goals / Updated goals: 
  NEW Goals: to be achieved in 4 weeks: for a total of 12 visits, 3x/week at 2 hours each 1. Patient will demonstrate increased pain of no more than 2 points on Verbal Analog Scale lasting no more than 48 hours after increased resistance on lifting of 50lbs at 5 sets of 3 repetitions over a two hour period.  (Eval: 26lbs). [address] [facility] 25#  Lift and carry with 0 pain 5/9/19 2. Patient will demonstrate ability to stoop and lift 65lbs (eval 50lbs) for 5 sets of 3 repetitions over a 2 hour period without difficulty CURRENT: 10# 4/25/19 CURRENT not initiated 5/9/19 3. Patient will improve squatting tolerance time to 50lbs (Eval: 20lbs) without increased pain.   CURRENT 25# 15 reps  5/2/19  NA 5/9/19 4. Patient will demonstrate ability to carry 75lbs 100ft for 5 repetitions over a 2 hour period.  CURRENT 25# 100 feet total  5/9/19 
5. 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 CURRENT NA 5/9/19 6. Patient will be able to step up on 20\" box carrying 50lbs 5 repetitions over a 2 hour period without lasting increase of pain. CURRENT 10 inch 10X 5/9/19 
  
  
  
  
PLAN [x]  Upgrade activities as tolerated     [x]  Continue plan of care 
[]  Update interventions per flow sheet      
[]  Discharge due to:_ 
[]  Other:_   
  
Jeff Esteban, PT      5/10/2019                    1:07 PM 
  
      
Future Appointments Date Time Provider Diogo Katz 5/21/2019  1:50 PM Anahi Bonilla MD [facility] SCHED  
  
 
  
  
Revision History View Details Report

## 2019-05-21 ENCOUNTER — OFFICE VISIT (OUTPATIENT)
Dept: ORTHOPEDIC SURGERY | Facility: CLINIC | Age: 39
End: 2019-05-21

## 2019-05-21 VITALS
HEART RATE: 95 BPM | SYSTOLIC BLOOD PRESSURE: 131 MMHG | TEMPERATURE: 97.9 F | OXYGEN SATURATION: 99 % | HEIGHT: 66 IN | DIASTOLIC BLOOD PRESSURE: 83 MMHG | WEIGHT: 206.6 LBS | BODY MASS INDEX: 33.2 KG/M2

## 2019-05-21 DIAGNOSIS — S39.012D BACK STRAIN, SUBSEQUENT ENCOUNTER: Primary | ICD-10-CM

## 2019-05-21 NOTE — PROGRESS NOTES
Patient: Rahel Fletcher                MRN: [id number]       SSN: [id number]  YOB: 1980        AGE: 45 y.o. SEX: female  Body mass index is 33.35 kg/m². PCP: Tessie Rogers MD  05/21/19    Chief Complaint: Low back follow up    HISTORY OF PRESENT ILLNESS:  Alejandro Guerra returns to the office today for her low back. She has been in therapy. She has been going about two times a week. Unfortunately, she has continued to have some pain in her back. She has regressed somewhat in physical therapy and is having difficulty with lifting greater than 30 or 35 pounds. No recent injury or trauma. Past Medical History:   Diagnosis Date    Diabetes ([address]     Pt. states pre-diabetic    Female bladder prolapse     Thyroid disease     Pt. states they are watching her levels.  Unspecified urinary incontinence        Family History   Problem Relation Age of Onset    Hypertension Father     Heart Disease Father     Hypertension Paternal Grandfather     Hypertension Mother     Hypertension Brother     Cancer Neg Hx     Diabetes Neg Hx        Current Outpatient Medications   Medication Sig Dispense Refill    acetaminophen (TYLENOL) 325 mg tablet Take 2 Tabs by mouth every six (6) hours as needed for Pain. 20 Tab 0    dicyclomine (BENTYL) 20 mg tablet Take 20 mg by mouth every six (6) hours as needed.  BUDESONIDE PO Take 3 mg by mouth three (3) times daily.  MESALAMINE (PENTASA PO) Take  by mouth two (2) times a day.          No Known Allergies    Past Surgical History:   Procedure Laterality Date    COLONOSCOPY N/A 9/1/2017    COLONOSCOPY, DIAGNOSTIC /c Bx performed by Bryn Thompson MD at [facility]      Pt with 2 previous abortions, and multiple surgeries to release adhesions       Social History     Socioeconomic History    Marital status:      Spouse name: Not on file    Number of children: Not on file    Years of education: Not on file    Highest education level: Not on file   Occupational History    Not on file   Social Needs    Financial resource strain: Not on file    Food insecurity:     Worry: Not on file     Inability: Not on file    Transportation needs:     Medical: Not on file     Non-medical: Not on file   Tobacco Use    Smoking status: Never Smoker    Smokeless tobacco: Never Used   Substance and Sexual Activity    Alcohol use: Yes     Comment: occassionally    Drug use: No    Sexual activity: Yes     Partners: Male   Lifestyle    Physical activity:     Days per week: Not on file     Minutes per session: Not on file    Stress: Not on file   Relationships    Social connections:     Talks on phone: Not on file     Gets together: Not on file     Attends Taoism service: Not on file     Active member of club or organization: Not on file     Attends meetings of clubs or organizations: Not on file     Relationship status: Not on file    Intimate partner violence:     Fear of current or ex partner: Not on file     Emotionally abused: Not on file     Physically abused: Not on file     Forced sexual activity: Not on file   Other Topics Concern    Not on file   Social History Narrative    Not on file       REVIEW OF SYSTEMS:      No changes from previous review of systems unless noted. PHYSICAL EXAMINATION:  Visit Vitals  /83 (BP 1 Location: Left arm, BP Patient Position: Sitting)   Pulse 95   Temp 97.9 °F (36.6 °C) (Oral)   Ht 5' 6\" (1.676 m)   Wt 206 lb 9.6 oz (93.7 kg)   SpO2 99%   BMI 33.35 kg/m²     Body mass index is 33.35 kg/m². GENERAL: Alert and oriented x3, in no acute distress. HEENT: Normocephalic, atraumatic. RESP: Non labored breathing. SKIN: No rashes or lesions noted. PHYSICAL EXAM:  Physical exam of the low back with no focal neurological deficits. Neurovascularly intact distally. ASSESSMENT AND PLAN:   Chavez Kitchen has progressed somewhat with her low back injury.   I would like to do another couple of weeks of physical therapy and then plan to see her back in three weeks. Hopefully at that point, we will at least be allowed to release her to work with some lifting restrictions if anything at all. All of her and her 's questions were answered.               Electronically signed by: Shun Crump MD

## 2019-05-21 NOTE — LETTER
NOTIFICATION RETURN TO WORK / SCHOOL 
 
5/21/2019 2:20 PM 
 
Ms. Beni Silva [address] [id number] To Whom It May Concern: 
 
Beni Silva is currently under the care of [address] [place] [address]. No work prior to June 12th Sincerely, 
 
 
Luster Galeazzi, MD

## 2019-05-22 ENCOUNTER — HOSPITAL ENCOUNTER (OUTPATIENT)
Dept: PHYSICAL THERAPY | Age: 39
Discharge: HOME | End: 2019-05-22
Payer: OTHER GOVERNMENT

## 2019-05-22 PROCEDURE — 97545 WORK HARDENING: CPT

## 2019-05-22 NOTE — PROGRESS NOTES
PT DAILY TREATMENT NOTE 10-18    Patient Name: Amalia Cheung  Date:2019  : 1980  [x]  Patient  Verified  Payor: DEPARTMENT OF LABOR / Plan: [address] [place] [address] Avenue / Product Type: Workers Comp /    In time:2:40   Out time: 4:35  Total Treatment Time (min): 115  Visit #:12 of 13    Medicare/BCBS Only   Total Timed Codes (min):  1:1 Treatment Time:         Treatment Area: Strain of muscle, fascia and tendon of lower back, subsequent encounter [S39.012D]    SUBJECTIVE  Pain Level (0-10 scale): 0  Any medication changes, allergies to medications, adverse drug reactions, diagnosis change, or new procedure performed?: [x] No    [] Yes (see summary sheet for update)  Subjective functional status/changes:   [] No changes reported  No pain.     OBJECTIVE    Modality rationale: decrease edema, decrease inflammation, decrease pain and increase tissue extensibility to improve the patients ability to perform ADL    Min Type Additional Details    [] Estim:  []Unatt       []IFC  []Premod                        []Other:  []w/ice   []w/heat  Position:  Location:    [] Estim: []Att    []TENS instruct  []NMES                    []Other:  []w/US   []w/ice   []w/heat  Position:  Location:    []  Traction: [] Cervical       []Lumbar                       [] Prone          []Supine                       []Intermittent   []Continuous Lbs:  [] before manual  [] after manual    []  Ultrasound: []Continuous   [] Pulsed                           []1MHz   []3MHz W/cm2:  Location:    []  Iontophoresis with dexamethasone         Location: [] Take home patch   [] In clinic    []  Ice     []  heat  []  Ice massage  []  Laser   []  Anodyne Position:  Location:    []  Laser with stim  []  Other:  Position:  Location:    []  Vasopneumatic Device Pressure:       [] lo [] med [] hi   Temperature: [] lo [] med [] hi   [x] Skin assessment post-treatment:  [x]intact []redness- no adverse reaction    []redness  adverse reaction: min []Eval                  []Re-Eval       115 min Therapeutic Exercise:  [] See flow sheet :   Rationale: increase ROM and increase strength to improve the patients ability to perform ADL     min Therapeutic Activity:  []  See flow sheet :   Rationale:   to improve the patients ability to       min Neuromuscular Re-education:  []  See flow sheet :   Rationale:   to improve the patients ability to      min Manual Therapy:     Rationale: decrease pain, increase ROM, increase tissue extensibility and decrease edema  to     min Gait Training:  ___ feet with ___ device on level surfaces with ___ level of assist   Rationale: With   [x] TE   [] TA   [] neuro   [] other: Patient Education: [x] Review HEP    [] Progressed/Changed HEP based on:   [] positioning   [] body mechanics   [] transfers   [] heat/ice application    [] other:      Other Objective/Functional Measures: Pt  Experienced   Pain  At  LSP  With  Chest  Press. Pt  Required  Mod  (A) on   Getting  Up  From  bench    Pain Level (0-10 scale) post treatment:3    ASSESSMENT/Changes in Function:  Pt  Was  Unable  To perform  Deadlift/squat hand  Arms,  With  30#. Decreased  Weights  To 15#. Patient will continue to benefit from skilled PT services to address functional mobility deficits, address ROM deficits, address strength deficits, analyze and address soft tissue restrictions, analyze and cue movement patterns and instruct in home and community integration to attain remaining goals. [x]  See Plan of Care  []  See progress note/recertification  []  See Discharge Summary         Progress towards goals / Updated goals:  1. Patient will demonstrate increased pain of no more than 2 points on Verbal Analog Scale lasting no more than 48 hours after increased resistance on lifting of 50lbs at 5 sets of 3 repetitions over a two hour period.  (Eval: 26lbs). [address] [facility] 25#  Lift and carry with 0 pain 5/9/19  2.  Patient will demonstrate ability to stoop and lift 65lbs (eval 50lbs) for 5 sets of 3 repetitions over a 2 hour period without difficulty CURRENT: 10# 4/25/19 CURRENT not initiated 5/9/19  3. Patient will improve squatting tolerance time to 50lbs (Eval: 20lbs) without increased pain.   CURRENT 25# 15 reps  5/2/19  NA 5/9/19  4. Patient will demonstrate ability to carry 75lbs 100ft for 5 repetitions over a 2 hour period.  CURRENT 30# 100 feet total  5/22/19  5. 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 CURRENT NA 5/22/19  6.  Patient will be able to step up on 20\" box carrying 50lbs 5 repetitions over a 2 hour period without lasting increase of pain. CURRENT 10 inch 10X 5/9/19           PLAN  []  Upgrade activities as tolerated     []  Continue plan of care  []  Update interventions per flow sheet       []  Discharge due to:_  []  Other:_      [address] [facility] 5/22/2019  3:11 PM    Future Appointments   Date Time Provider Diogo Katz   5/24/2019  2:30 PM [facility] [address]   6/12/2019  1:10 PM Stone Braxton MD [facility] [address]

## 2019-05-23 ENCOUNTER — TELEPHONE (OUTPATIENT)
Dept: ORTHOPEDIC SURGERY | Facility: CLINIC | Age: 39
End: 2019-05-23

## 2019-05-23 DIAGNOSIS — S39.012D BACK STRAIN, SUBSEQUENT ENCOUNTER: Primary | ICD-10-CM

## 2019-05-23 NOTE — TELEPHONE ENCOUNTER
Roberto Chairez (Nurse ) called stating that they need a a 2 week extension for work conditioning put in for [employer] and faxed to her no later than tomorrow morning. Fax number [phone]. She said that Mrs. Samina Torres has used her last referral visits up already and just need the extension.  Please advise Mee Mariee with any questions at [phone]

## 2019-05-24 ENCOUNTER — HOSPITAL ENCOUNTER (OUTPATIENT)
Dept: PHYSICAL THERAPY | Age: 39
Discharge: HOME | End: 2019-05-24
Payer: OTHER GOVERNMENT

## 2019-05-24 PROCEDURE — 97545 WORK HARDENING: CPT

## 2019-05-24 NOTE — PROGRESS NOTES
PT DAILY TREATMENT NOTE 10-18    Patient Name: Joseph Cedillo  Date:2019  : 1980  [x]  Patient  Verified  Payor: DEPARTMENT OF LABOR / Plan: [address] [place] [address] Avenue / Product Type:  Workers Comp /    In time: 3:00  Out time:4:42  Total Treatment Time (min): 115  Visit #: 13 of 13    Medicare/BCBS Only   Total Timed Codes (min):   1:1 Treatment Time:         Treatment Area: Strain of muscle, fascia and tendon of lower back, subsequent encounter [S39.688D]    SUBJECTIVE  Pain Level (0-10 scale): 0  Any medication changes, allergies to medications, adverse drug reactions, diagnosis change, or new procedure performed?: [x] No    [] Yes (see summary sheet for update)  Subjective functional status/changes:   [] No changes reported  No pain,    OBJECTIVE    Modality rationale: decrease edema, decrease inflammation, decrease pain and increase tissue extensibility to improve the patients ability to perform ADL    Min Type Additional Details    [] Estim:  []Unatt       []IFC  []Premod                        []Other:  []w/ice   []w/heat  Position:  Location:    [] Estim: []Att    []TENS instruct  []NMES                    []Other:  []w/US   []w/ice   []w/heat  Position:  Location:    []  Traction: [] Cervical       []Lumbar                       [] Prone          []Supine                       []Intermittent   []Continuous Lbs:  [] before manual  [] after manual    []  Ultrasound: []Continuous   [] Pulsed                           []1MHz   []3MHz W/cm2:  Location:    []  Iontophoresis with dexamethasone         Location: [] Take home patch   [] In clinic    []  Ice     []  heat  []  Ice massage  []  Laser   []  Anodyne Position:  Location:    []  Laser with stim  []  Other:  Position:  Location:    []  Vasopneumatic Device Pressure:       [] lo [] med [] hi   Temperature: [] lo [] med [] hi   [x] Skin assessment post-treatment:  [x]intact []redness- no adverse reaction    []redness  adverse reaction:      min []Eval                  []Re-Eval       115 min Therapeutic Exercise:  [] See flow sheet :   Rationale: increase ROM and increase strength to improve the patients ability to perform ADL      min Therapeutic Activity:  []  See flow sheet :   Rationale:   to improve the patients ability to       min Neuromuscular Re-education:  []  See flow sheet :   Rationale:   to improve the patients ability to      min Manual Therapy:     Rationale:  to      min Gait Training:  ___ feet with ___ device on level surfaces with ___ level of assist   Rationale: With   [x] TE   [] TA   [] neuro   [] other: Patient Education: [x] Review HEP    [] Progressed/Changed HEP based on:   [] positioning   [] body mechanics   [] transfers   [] heat/ice application    [] other: REASSESS GOALs     Other Objective/Functional Measures: FOTO:  67    Pain Level (0-10 scale) post treatment: 0    ASSESSMENT/Changes in Function:Mrs. Tijerina  Reports   50%  Improvement. Pain level  On average 2-3. Patient will continue to benefit from skilled PT services to address functional mobility deficits, address ROM deficits, address strength deficits, analyze and address soft tissue restrictions, analyze and cue movement patterns and instruct in home and community integration to attain remaining goals. [x]  See Plan of Care  []  See progress note/recertification  []  See Discharge Summary         Progress towards goals / Updated goals:  1. Patient will demonstrate increased pain of no more than 2 points on Verbal Analog Scale lasting no more than 48 hours after increased resistance on lifting of 50lbs at 5 sets of 3 repetitions over a two hour period.  (Eval: 26lbs). [address] [facility] 25#  Lift and carry with 2-3 pain 5/24/19  2. Patient will demonstrate ability to stoop and lift 65lbs (eval 50lbs) for 5 sets of 3 repetitions over a 2 hour period without difficulty CURRENT: 30# 4/25/19 5/24/19  3.  Patient will improve squatting tolerance time to 50lbs (Eval: 20lbs) without increased pain.   CURRENT 30# 15 reps  5/24/19   4. Patient will demonstrate ability to carry 75lbs 100ft for 5 repetitions over a 2 hour period.  CURRENT 30# 100 feet total  5/24/19  5. 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 CURRENT NA 5/22/19  6.  Patient will be able to step up on 20\" box carrying 50lbs 5 repetitions over a 2 hour period without lasting increase of pain. CURRENT 10 inch 10X 5/24/19           PLAN  []  Upgrade activities as tolerated     []  Continue plan of care  []  Update interventions per flow sheet       []  Discharge due to:_  [x]  Other:_ Awaiting MD  Approval  For  continuation    Judit Sandra, NORAH 5/24/2019  2:54 PM    Future Appointments   Date Time Provider Diogo Katz   6/12/2019  1:10 PM Fiona Herrera MD MD [facility]avier

## 2019-05-31 ENCOUNTER — HOSPITAL ENCOUNTER (OUTPATIENT)
Dept: PHYSICAL THERAPY | Age: 39
Discharge: HOME | End: 2019-05-31
Payer: OTHER GOVERNMENT

## 2019-05-31 PROCEDURE — 97545 WORK HARDENING: CPT

## 2019-05-31 NOTE — PROGRESS NOTES
PT DAILY TREATMENT NOTE 10-18    Patient Name: Chelsie Espinoza  Date:2019  : 1980  [x]  Patient  Verified  Payor: DEPARTMENT OF LABOR / Plan: [address] [place] [address] Avenue / Product Type: Workers Comp /    In time: 2:00  Out time:3:40  Total Treatment Time (min): 105  Visit #: 14 of      Medicare/BCBS Only   Total Timed Codes (min):   1:1 Treatment Time:         Treatment Area: Strain of muscle, fascia and tendon of lower back, subsequent encounter [S39.678D]    SUBJECTIVE  Pain Level (0-10 scale): 0  Any medication changes, allergies to medications, adverse drug reactions, diagnosis change, or new procedure performed?: [x] No    [] Yes (see summary sheet for update)  Subjective functional status/changes:   [] No changes reported  Feeling ok.     OBJECTIVE    Modality rationale: decrease edema, decrease inflammation, decrease pain and increase tissue extensibility to improve the patients ability to perform ADL    Min Type Additional Details    [] Estim:  []Unatt       []IFC  []Premod                        []Other:  []w/ice   []w/heat  Position:  Location:    [] Estim: []Att    []TENS instruct  []NMES                    []Other:  []w/US   []w/ice   []w/heat  Position:  Location:    []  Traction: [] Cervical       []Lumbar                       [] Prone          []Supine                       []Intermittent   []Continuous Lbs:  [] before manual  [] after manual    []  Ultrasound: []Continuous   [] Pulsed                           []1MHz   []3MHz W/cm2:  Location:    []  Iontophoresis with dexamethasone         Location: [] Take home patch   [] In clinic    []  Ice     []  heat  []  Ice massage  []  Laser   []  Anodyne Position:  Location:    []  Laser with stim  []  Other:  Position:  Location:    []  Vasopneumatic Device Pressure:       [] lo [] med [] hi   Temperature: [] lo [] med [] hi   [x] Skin assessment post-treatment:  [x]intact []redness- no adverse reaction    []redness  adverse reaction:      min []Eval                  []Re-Eval       105 min Therapeutic Exercise:  [x] See flow sheet :   Rationale: increase ROM and increase strength to improve the patients ability to perform ADL      min Therapeutic Activity:  []  See flow sheet :   Rationale:   to improve the patients ability to       min Neuromuscular Re-education:  []  See flow sheet :   Rationale:   to improve the patients ability to      min Manual Therapy:     Rationale:  to      min Gait Training:  ___ feet with ___ device on level surfaces with ___ level of assist   Rationale: With   [x] TE   [] TA   [] neuro   [] other: Patient Education: [x] Review HEP    [] Progressed/Changed HEP based on:   [] positioning   [] body mechanics   [] transfers   [] heat/ice application    [] other:      Other Objective/Functional Measures:  Pt  Experienced  Pain  At  Legs  Once  Pt  Stretched  Pain decreased  Decreased  Weight overhead  Press/unable  To perform  Leg  Press  Due  To leg  Pain. Pain Level (0-10 scale) post treatment: 1    ASSESSMENT/Changes in Function: Overall  Fair  Response  To each there ex. Patient will continue to benefit from skilled PT services to address functional mobility deficits, address ROM deficits, address strength deficits, analyze and address soft tissue restrictions and analyze and cue movement patterns to attain remaining goals. [x]  See Plan of Care  []  See progress note/recertification  []  See Discharge Summary         Progress towards goals / Updated goals:  1. Patient will demonstrate increased pain of no more than 2 points on Verbal Analog Scale lasting no more than 48 hours after increased resistance on lifting of 50lbs at 5 sets of 3 repetitions over a two hour period.  (Eval: 26lbs). 310 [facility] 25#  Lift and carry with 2-3 pain 5/24/19  2.  Patient will demonstrate ability to stoop and lift 65lbs (eval 50lbs) for 5 sets of 3 repetitions over a 2 hour period without difficulty CURRENT: 30# 4/25/19 5/24/19  3. Patient will improve squatting tolerance time to 50lbs (Eval: 20lbs) without increased pain.   CURRENT 30# 15 reps  5/24/19   4. Patient will demonstrate ability to carry 75lbs 100ft for 5 repetitions over a 2 hour period.  CURRENT 30# 100 feet total  5/24/19  5. 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 CURRENT NA 5/22/19  6.  Patient will be able to step up on 20\" box carrying 50lbs 5 repetitions over a 2 hour period without lasting increase of pain. CURRENT 10 inch 10X 5/24/19           PLAN  [x]  Upgrade activities as tolerated     []  Continue plan of care  []  Update interventions per flow sheet       []  Discharge due to:_  []  Other:_      Sarika Martínez PTA 5/31/2019  2:31 PM    Future Appointments   Date Time Provider Diogo Bradfordi   6/4/2019 10:30 AM Jodi Ortega, PT [facility]   6/5/2019  9:00 AM Mitch Preston PTA [facility]   6/7/2019 10:00 AM Jodi Ortega, PT [facility]   6/10/2019 10:00 AM Dorothye Sicard, PT [facility]   6/12/2019  1:10 PM MD MAHIN Valentine

## 2019-06-04 ENCOUNTER — HOSPITAL ENCOUNTER (OUTPATIENT)
Dept: PHYSICAL THERAPY | Age: 39
Discharge: HOME | End: 2019-06-04
Payer: OTHER GOVERNMENT

## 2019-06-04 PROCEDURE — 97545 WORK HARDENING: CPT

## 2019-06-04 NOTE — PROGRESS NOTES
[facility], [address] [place] [address], [address] [place], [place] [address]y [address],[address]  [phone] fax    Physician Update  [x] Progress Note  [] Discharge Summary  Patient name: Elbert Watson Start of Care: 19   Referral source: Cher Machado MD : 1980   Medical/Treatment Diagnosis: Strain of muscle, fascia and tendon of lower back, subsequent encounter [S39.012D]  Payor: [address] / Plan: [address] [facility] / Product Type: Workers Comp /  Onset Date:19     Prior Hospitalization: see medical history Provider#: [id number]   Medications: Verified on Patient Summary List    Comorbidities: thyroid   Prior Level of Function: works as [employer]. Has been out of work due to injury. Lives in 2nd level apartment. Active mother of 3.       Visits from Start of Care: 15    Missed Visits: 0    Status at Evaluation/Last Progress Note: latest MD note  Progress towards Goals: received RX from MD to continue X 2 weeks and authorization is through 6/10/19. FOTO 75.  Pain 0 at arrival and 2 post session. Stoop and lift 35#, front carry 35#, 10 inch box step up with 30# 10X. She has 18 total approved sessions per workers comp. She demonstrates the potential to make further functional gains within this time ,thank you.    Goals: to be achieved in 18 total treatments:  Continue with previous goals    ASSESSMENT/RECOMMENDATIONS:  [x]Continue therapy per initial plan/protocol at a frequency of  2-3 x per week for 18 total treatments  []Continue therapy with the following recommended changes:_____________________      _____________________________________________________________________  []Discontinue therapy progressing towards or have reached established goals  []Discontinue therapy due to lack of appreciable progress towards goals  []Discontinue therapy due to lack of attendance or compliance  []Await Physician's recommendations/decisions regarding therapy  []Other:________________________________________________________________    Thank you for this referral. Tanvi Doe, PT 6/4/2019 10:27 AM  NOTE TO PHYSICIAN:  PLEASE COMPLETE THE ORDERS BELOW AND   FAX TO [facility]: [phone]  If you are unable to process this request in 24 hours please contact our office: [phone]    ? I have read the above report and request that my patient continue as recommended. ? I have read the above report and request that my patient continue therapy with the following changes/special instructions:_____________________________________  ? I have read the above report and request that my patient be discharged from therapy.     [de-identified] Signature:____________Date:_________TIME:________    [facility], Date and Time must be completed for valid certification **

## 2019-06-04 NOTE — PROGRESS NOTES
PT DAILY TREATMENT NOTE 10-18    Patient Name: Sara Precise  Date:2019  : 1980  [x]  Patient  Verified  Payor: [address] [place] [address] / Plan: [address] [place] [address] / Product Type: Workers Comp /    In [place]  Total Treatment Time (min): 120  Visit #: 15 of 18    Medicare/BCBS Only   Total Timed Codes (min):   1:1 Treatment Time:         Treatment Area: Strain of muscle, fascia and tendon of lower back, subsequent encounter [I70.095J]    SUBJECTIVE  Pain Level (0-10 scale): 0  Any medication changes, allergies to medications, adverse drug reactions, diagnosis change, or new procedure performed?: [x] No    [] Yes (see summary sheet for update)  Subjective functional status/changes:   [] No changes reported  \"I am not having any pain right nowo. \"    OBJECTIVE    Modality rationale:     Min Type Additional Details    [] Estim:  []Unatt       []IFC  []Premod                        []Other:  []w/ice   []w/heat  Position:  Location:    [] Estim: []Att    []TENS instruct  []NMES                    []Other:  []w/US   []w/ice   []w/heat  Position:  Location:    []  Traction: [] Cervical       []Lumbar                       [] Prone          []Supine                       []Intermittent   []Continuous Lbs:  [] before manual  [] after manual    []  Ultrasound: []Continuous   [] Pulsed                           []1MHz   []3MHz W/cm2:  Location:    []  Iontophoresis with dexamethasone         Location: [] Take home patch   [] In clinic    []  Ice     []  heat  []  Ice massage  []  Laser   []  Anodyne Position:  Location:    []  Laser with stim  []  Other:  Position:  Location:    []  Vasopneumatic Device Pressure:       [] lo [] med [] hi   Temperature: [] lo [] med [] hi   [] Skin assessment post-treatment:  []intact []redness- no adverse reaction    []redness - adverse reaction:       min []Eval                  []Re-Eval       60 min Therapeutic Exercise:  [x] See flow sheet : Rationale: increase ROM, increase strength, improve coordination, improve balance and increase proprioception to improve the patients ability to aid with increase tolerance to ADLs and activities and work demands    60 min Therapeutic Activity:  [x]  See flow sheet :   Rationale: increase ROM, increase strength, improve coordination and improve balance  to improve the patients ability to aid with increase tolerance to ADLs and work demands      min Neuromuscular Re-education:  []  See flow sheet :   Rationale:   to improve the patients ability to      min Manual Therapy:     Rationale:  to      min Gait Training:  ___ feet with ___ device on level surfaces with ___ level of assist   Rationale: With   [x] TE   [x] TA   [] neuro   [] other: Patient Education: [x] Review HEP    [] Progressed/Changed HEP based on:   [] positioning   [] body mechanics   [] transfers   [] heat/ice application    [] other:      Other Objective/Functional Measures: VC, supervision with work conditioning activities FOTO 75    Pain Level (0-10 scale) post treatment: 2    ASSESSMENT/Changes in Function: tolerated well. Patient will continue to benefit from skilled PT services to modify and progress therapeutic interventions, address ROM deficits, address strength deficits, analyze and address soft tissue restrictions, analyze and cue movement patterns, analyze and modify body mechanics/ergonomics, assess and modify postural abnormalities and instruct in home and community integration to attain remaining goals. [x]  See Plan of Care  [x]  See progress note/recertification  []  See Discharge Summary         Progress towards goals / Updated goals:   Progress towards goals / Updated goals:  1.  Patient will demonstrate increased pain of no more than 2 points on Verbal Analog Scale lasting no more than 48 hours after increased resistance on lifting of 50lbs at 5 sets of 3 repetitions over a two hour period.  (Eval: 26lbs). 310 [facility] 25#  Lift and carry with 2-3 pain 5/24/19    35 # pain 0-2 6/4/19  2. Patient will demonstrate ability to stoop and lift 65lbs (eval 50lbs) for 5 sets of 3 repetitions over a 2 hour period without difficulty CURRENT: 30# 4/25/19 5/24/19  35# 6/4/19  3. Patient will improve squatting tolerance time to 50lbs (Eval: 20lbs) without increased pain.   CURRENT 30# 15 reps  5/24/19  Discontinued   4. Patient will demonstrate ability to carry 75lbs 100ft for 5 repetitions over a 2 hour period.  CURRENT 30# 100 feet total  5/24/19 35# 6/4/19  5. 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 CURRENT NA 5/22/19 6/4/19  6.  Patient will be able to step up on 20\" box carrying 50lbs 5 repetitions over a 2 hour period without lasting increase of pain. CURRENT 10 inch 10X 5/24/19  10inch 10X 30# 6/4/19           PLAN  [x]  Upgrade activities as tolerated     [x]  Continue plan of care  []  Update interventions per flow sheet       []  Discharge due to:_  [x]  Other:_  Send MD update to cover 18 visits  Mg Bee PT 6/4/2019  10:24 AM    Future Appointments   Date Time Provider Diogo Katz   6/4/2019 10:30 AM Anat Rubio PT [facility]   6/5/2019  9:00 AM [facility] Liang PTA [facility]   6/7/2019 10:00 AM Anat Rubio PT [facility]   6/10/2019 10:00 AM Park Granados, PT [facility]   6/12/2019  1:10 PM Danuta Hardin MD MD Zafar Jimenez

## 2019-06-05 ENCOUNTER — HOSPITAL ENCOUNTER (OUTPATIENT)
Dept: PHYSICAL THERAPY | Age: 39
Discharge: HOME | End: 2019-06-05
Payer: OTHER GOVERNMENT

## 2019-06-05 PROCEDURE — 97545 WORK HARDENING: CPT

## 2019-06-05 NOTE — PROGRESS NOTES
PT DAILY TREATMENT NOTE 10-18    Patient Name: Jhonatan Diaz  Date:2019  : 1980  [x]  Patient  Verified  Payor: [address] [place] [address] / Plan: [address] [place] [address] / Product Type: Workers Comp /    In time:  9:03 Out time:10:55  Total Treatment Time (min): 110  Visit #:16 of 18    Medicare/BCBS Only   Total Timed Codes (min):   1:1 Treatment Time:        Treatment Area: Strain of muscle, fascia and tendon of lower back, subsequent encounter [S39.115D]    SUBJECTIVE  Pain Level (0-10 scale): 3  Any medication changes, allergies to medications, adverse drug reactions, diagnosis change, or new procedure performed?: [x] No    [] Yes (see summary sheet for update)  Subjective functional status/changes:   [] No changes reported  Pt  Reports,  \"Having  Minimal  Pain. \"    OBJECTIVE    Modality rationale: decrease edema, decrease inflammation, decrease pain and increase tissue extensibility to improve the patients ability to perform ADL    Min Type Additional Details    [] Estim:  []Unatt       []IFC  []Premod                        []Other:  []w/ice   []w/heat  Position:  Location:    [] Estim: []Att    []TENS instruct  []NMES                    []Other:  []w/US   []w/ice   []w/heat  Position:  Location:    []  Traction: [] Cervical       []Lumbar                       [] Prone          []Supine                       []Intermittent   []Continuous Lbs:  [] before manual  [] after manual    []  Ultrasound: []Continuous   [] Pulsed                           []1MHz   []3MHz W/cm2:  Location:    []  Iontophoresis with dexamethasone         Location: [] Take home patch   [] In clinic    []  Ice     []  heat  []  Ice massage  []  Laser   []  Anodyne Position:  Location:    []  Laser with stim  []  Other:  Position:  Location:    []  Vasopneumatic Device Pressure:       [] lo [] med [] hi   Temperature: [] lo [] med [] hi   [x] Skin assessment post-treatment:  []intact []redness- no adverse reaction []redness - adverse reaction:      min []Eval                  []Re-Eval       110 min Therapeutic Exercise:  [x] See flow sheet :   Rationale: increase ROM and increase strength to improve the patients ability to perform ADL      min Therapeutic Activity:  []  See flow sheet :   Rationale:   to improve the patients ability to       min Neuromuscular Re-education:  []  See flow sheet :   Rationale:   to improve the patients ability to      min Manual Therapy:     Rationale:  to      min Gait Training:  ___ feet with ___ device on level surfaces with ___ level of assist   Rationale: With   [x] TE   [] TA   [] neuro   [] other: Patient Education: [x] Review HEP    [] Progressed/Changed HEP based on:   [] positioning   [] body mechanics   [] transfers   [] heat/ice application    [] other:      Other Objective/Functional Measures: pt   Decreased  Weights  For  Deadlift/sled push/pull  Due  To  pain    Pain Level (0-10 scale) post treatment:     ASSESSMENT/Changes in Function: Overall  Fair  Response  To remaining there ex. Patient will continue to benefit from skilled PT services to address functional mobility deficits, address ROM deficits, address strength deficits, analyze and address soft tissue restrictions and analyze and cue movement patterns to attain remaining goals. [x]  See Plan of Care  []  See progress note/recertification  []  See Discharge Summary         Progress towards goals / Updated goals:   Progress towards goals / Updated goals:  1. Patient will demonstrate increased pain of no more than 2 points on Verbal Analog Scale lasting no more than 48 hours after increased resistance on lifting of 50lbs at 5 sets of 3 repetitions over a two hour period.  (Eval: 26lbs). 310 [facility] 25#  Lift and carry with 2-3 pain 5/24/19    35 # pain 0-2 6/4/19  2.  Patient will demonstrate ability to stoop and lift 65lbs (eval 50lbs) for 5 sets of 3 repetitions over a 2 hour period without difficulty CURRENT: 30# 4/25/19 5/24/19  35# 6/4/19  3. Patient will improve squatting tolerance time to 50lbs (Eval: 20lbs) without increased pain.   CURRENT 30# 15 reps  5/24/19  Discontinued   4. Patient will demonstrate ability to carry 75lbs 100ft for 5 repetitions over a 2 hour period.  CURRENT 30# 100 feet total  5/24/19 35# 6/4/19  5. 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 CURRENT NA 5/22/19 6/4/19  6.  Patient will be able to step up on 20\" box carrying 50lbs 5 repetitions over a 2 hour period without lasting increase of pain. CURRENT 10 inch 10X 5/24/19  10inch 10X 30# 6/4/19        PLAN  []  Upgrade activities as tolerated     [x]  Continue plan of care  []  Update interventions per flow sheet       []  Discharge due to:_  []  Other:_      Sarika Martínez PTA 6/5/2019  9:43 AM    Future Appointments   Date Time Provider Diogo Katz   6/7/2019 10:00 AM John Melchor, PT [facility]   6/10/2019 10:00 AM Tena Vargas PTA [facility]   6/12/2019  1:10 PM MD MAHIN Staples

## 2019-06-07 ENCOUNTER — HOSPITAL ENCOUNTER (OUTPATIENT)
Dept: PHYSICAL THERAPY | Age: 39
Discharge: HOME | End: 2019-06-07
Payer: OTHER GOVERNMENT

## 2019-06-07 PROCEDURE — 97530 THERAPEUTIC ACTIVITIES: CPT

## 2019-06-07 PROCEDURE — 97545 WORK HARDENING: CPT

## 2019-06-07 PROCEDURE — 97110 THERAPEUTIC EXERCISES: CPT

## 2019-06-07 NOTE — PROGRESS NOTES
PT DAILY TREATMENT NOTE 10-18    Patient Name: Ashwini Willingham  Date:2019  : 1980  [x]  Patient  Verified  Payor: [address] [place] [address] / Plan: [address] [place] [address] / Product Type: Workers Comp /    In [employer] time:3:32  Total Treatment Time (min): 80  Visit #: 17 of 18    Medicare/BCBS Only   Total Timed Codes (min):  92 1:1 Treatment Time:         Treatment Area: Strain of muscle, fascia and tendon of lower back, subsequent encounter [S39.012D]    SUBJECTIVE  Pain Level (0-10 scale): 0  Any medication changes, allergies to medications, adverse drug reactions, diagnosis change, or new procedure performed?: [x] No    [] Yes (see summary sheet for update)  Subjective functional status/changes:   [] No changes reported  States she is feeling fine, no pain    OBJECTIVE      46 min Therapeutic Exercise:  [x] See flow sheet :   Rationale: increase strength, improve coordination and increase proprioception to improve the patients ability to perform full duties while at work. 46 min Therapeutic Activity:  [x]  See flow sheet :   Rationale: increase strength, improve coordination, improve balance and increase proprioception  to improve the patients ability to return to work full time. With   [] TE   [] TA   [] neuro   [] other: Patient Education: [x] Review HEP    [] Progressed/Changed HEP based on:   [] positioning   [] body mechanics   [] transfers   [] heat/ice application    [] other:      Other Objective/Functional Measures:   Performs all activities with proper form and no changes in symptoms     Pain Level (0-10 scale) post treatment: 0    ASSESSMENT/Changes in Function: Patient continues to show improvements with signs/ symptoms however still demonstrates a decrease in strength, impaired ROM, deficits with balance and an increase in pain with functional activities.        Patient will continue to benefit from skilled PT services to modify and progress therapeutic interventions, address functional mobility deficits, address strength deficits, analyze and cue movement patterns and analyze and modify body mechanics/ergonomics to attain remaining goals. [x]  See Plan of Care  []  See progress note/recertification  []  See Discharge Summary         Progress towards goals / Updated goals:  1. Patient will demonstrate increased pain of no more than 2 points on Verbal Analog Scale lasting no more than 48 hours after increased resistance on lifting of 50lbs at 5 sets of 3 repetitions over a two hour period.  (Eval: 26lbs).     CURRENT 25#  Lift and carry with 2-3 pain 5/24/19    35 # pain 0-2 6/4/19  2. Patient will demonstrate ability to stoop and lift 65lbs (eval 50lbs) for 5 sets of 3 repetitions over a 2 hour period without difficulty    CURRENT: 30# 4/25/19 5/24/19  35# 6/4/19  3. Patient will improve squatting tolerance time to 50lbs (Eval: 20lbs) without increased pain.      CURRENT 30# 15 reps  5/24/19  Discontinued   4. Patient will demonstrate ability to carry 75lbs 100ft for 5 repetitions over a 2 hour period.    CURRENT 30# 100 feet total  5/24/19 35# 6/4/19  5. 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 CURRENT NA 5/22/19 6/4/19  6.  Patient will be able to step up on 20\" box carrying 50lbs 5 repetitions over a 2 hour period without lasting increase of pain.    CURRENT 10 inch 10X 5/24/19  10inch 10X 30# 6/4/19         PLAN  [x]  Upgrade activities as tolerated     [x]  Continue plan of care  []  Update interventions per flow sheet       []  Discharge due to:_  []  Other:_      Violet Juen, PT 6/7/2019  7:43 AM    Future Appointments   Date Time Provider Diogo Katz   6/7/2019  2:00 PM Reuben Andrew, PT [facility]   6/10/2019 10:00 AM Trang Ness PTA [facility]   6/12/2019  1:10 PM Mohini Muir MD [facility] [address]

## 2019-06-10 ENCOUNTER — HOSPITAL ENCOUNTER (OUTPATIENT)
Dept: PHYSICAL THERAPY | Age: 39
Discharge: HOME | End: 2019-06-10
Payer: OTHER GOVERNMENT

## 2019-06-10 PROCEDURE — 97545 WORK HARDENING: CPT

## 2019-06-10 NOTE — PROGRESS NOTES
PT DAILY TREATMENT NOTE 10-18    Patient Name: Tabby Koch  Date:6/10/2019  : 1980  [x]  Patient  Verified  Payor: DEPARTMENT OF LABOR / Plan: [address] [place] [address] Avenue / Product Type: Workers Comp /    In time:1:00  Out time:2:49  Total Treatment Time (min): 110  Visit #: 18 of 18    Medicare/BCBS Only   Total Timed Codes (min):   1:1 Treatment Time:         Treatment Area: Strain of muscle, fascia and tendon of lower back, subsequent encounter [S39.012D]    SUBJECTIVE  Pain Level (0-10 scale):    Any medication changes, allergies to medications, adverse drug reactions, diagnosis change, or new procedure performed?: [x] No    [] Yes (see summary sheet for update)  Subjective functional status/changes:   [] No changes reported    OBJECTIVE    Modality rationale: decrease edema, decrease inflammation, decrease pain and increase tissue extensibility to improve the patients ability to perform ADL    Min Type Additional Details    [] Estim:  []Unatt       []IFC  []Premod                        []Other:  []w/ice   []w/heat  Position:  Location:    [] Estim: []Att    []TENS instruct  []NMES                    []Other:  []w/US   []w/ice   []w/heat  Position:  Location:    []  Traction: [] Cervical       []Lumbar                       [] Prone          []Supine                       []Intermittent   []Continuous Lbs:  [] before manual  [] after manual    []  Ultrasound: []Continuous   [] Pulsed                           []1MHz   []3MHz W/cm2:  Location:    []  Iontophoresis with dexamethasone         Location: [] Take home patch   [] In clinic    []  Ice     []  heat  []  Ice massage  []  Laser   []  Anodyne Position:  Location:    []  Laser with stim  []  Other:  Position:  Location:    []  Vasopneumatic Device Pressure:       [] lo [] med [] hi   Temperature: [] lo [] med [] hi   [x] Skin assessment post-treatment:  [x]intact []redness- no adverse reaction    []redness - adverse reaction:      min []Eval                  []Re-Eval       110 min Therapeutic Exercise:  [] See flow sheet :   Rationale: increase ROM and increase strength to improve the patients ability to perform ADL      min Therapeutic Activity:  []  See flow sheet :   Rationale:   to improve the patients ability to       min Neuromuscular Re-education:  []  See flow sheet :   Rationale:   to improve the patients ability to      min Manual Therapy:     Rationale:  to      min Gait Training:  ___ feet with ___ device on level surfaces with ___ level of assist   Rationale: With   [x] TE   [] TA   [] neuro   [] other: Patient Education: [x] Review HEP    [] Progressed/Changed HEP based on:   [] positioning   [] body mechanics   [] transfers   [] heat/ice application    [] other: REASSESS GOALS/DC PROCEDURE     Other Objective/Functional Measures: FOTO: 70    Pain Level (0-10 scale) post treatment:  2    ASSESSMENT/Changes in Function: Mrs. Abdulaziz Rosa  Reports  70%  Improvement. Pain level  Average 2-3  With functional  Activity. Patient will continue to benefit from skilled PT services to address functional mobility deficits, address ROM deficits, address strength deficits, analyze and address soft tissue restrictions, analyze and cue movement patterns and instruct in home and community integration to attain remaining goals. [x]  See Plan of Care  []  See progress note/recertification  []  See Discharge Summary         Progress towards goals / Updated goals:  1. Patient will demonstrate increased pain of no more than 2 points on Verbal Analog Scale lasting no more than 48 hours after increased resistance on lifting of 50lbs at 5 sets of 3 repetitions over a two hour period.  (Eval: 26lbs).     CURRENT 25#  Lift and carry with 2-3 pain 5/24/19    35 # pain 0-2 6/4/19   30#  2/10  6/10/19  2.  Patient will demonstrate ability to stoop and lift 65lbs (eval 50lbs) for 5 sets of 3 repetitions over a 2 hour period without difficulty  CURRENT: 30# 4/25/19 5/24/19  35# 6/4/19    20#  6/10/19  3. Patient will improve squatting tolerance time to 50lbs (Eval: 20lbs) without increased pain.           CURRENT 30# 15 reps  5/24/19  Discontinued   4. Patient will demonstrate ability to carry 75lbs 100ft for 5 repetitions over a 2 hour period.         CURRENT 30# 100 feet total  5/24/19 35# 6/10/19  5. 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 CURRENT NA 5/22/19 6/4/19  6.  Patient will be able to step up on 20\" box carrying 50lbs 5 repetitions over a 2 hour period without lasting increase of pain.         CURRENT 10 inch 10X 5/24/19  10inch 10X 30# 6/10/19           PLAN  []  Upgrade activities as tolerated     []  Continue plan of care  []  Update interventions per flow sheet       []  Discharge due to:_  []  Other:_  [facility] 6/10/2019  2:35 PM    Future Appointments   Date Time Provider Diogo Katz   6/12/2019  1:10 PM Mika Lopez MD [facility] [address]

## 2019-06-11 NOTE — PROGRESS NOTES
[facility] [address], [address] [place] [address], [address]  [phone] fax    Discharge Summary    Patient name: Yolanda Foley     Start of Care: 19  Referral source: Rei Jaime MD    : 1980  Medical/Treatment Diagnosis: Strain of muscle, fascia and tendon of lower back, subsequent encounter [S39.012D]  Payor: [address] [place] [address] / Plan: [facility] / Product Type: Workers Comp /        Onset Date:19  Prior Hospitalization: see medical history   Provider#: [id number]  Comorbidities: thyroid   Prior Level of Function: works as [employer] . Has been out of work due to injury. Lives in [address]nd level apartment. Active mother of 3. Medications: Verified on Patient Summary List    Visits from Start of Care: 18    Missed Visits: 0  Reporting Period : 19 to 6/10/19      Summary of Care:Mrs. Siri Orourke  reports  70%  improvement. Pain level  Average 2-3 with functional activity. Approved workers's comp. Visits have been completed. Thank you. 1. Goal:Patient will demonstrate ability to stoop and lift 65lbs (eval 50lbs) for 5 sets of 3 repetitions over a 2 hour period without difficulty  Status at last note/certification:eval  Status at discharge: not met, progressing 25# 3 sets 10X    2. Goal:Patient will demonstrate ability to carry 75lbs 100ft for 5 repetitions over a 2 hour period. Status at last note/certification:eval  Status at discharge: not met, progressing,35# 5 X 30 feet    3. Goal:Patient will demonstrate ability to climb 2 flights of stairs carrying 50lbs without increased pain to simulate delivery of packages to apartment complexed  Status at last note/certification:eval  Status at discharge: not met, progressing  25# 4 steps 3X    4. Goal:Patient will be able to step up on 20\" box carrying 50lbs 5 repetitions over a 2 hour period without lasting increase of pain.    Status at last note/certification:eval  Status at discharge: not met, progressing  10 inch step  3 sets 10X      ASSESSMENT/RECOMMENDATIONS:  []Discontinue therapy progressing towards or have reached established goals  []Discontinue therapy due to lack of appreciable progress towards goals  []Discontinue therapy due to lack of attendance or compliance  [x]Other:workers comp benefits at max    Thank you for this referral.     Елена Muro, PT 6/11/2019 7:26 AM

## 2019-06-12 ENCOUNTER — OFFICE VISIT (OUTPATIENT)
Dept: ORTHOPEDIC SURGERY | Age: 39
End: 2019-06-12

## 2019-06-12 VITALS
HEART RATE: 72 BPM | HEIGHT: 66 IN | SYSTOLIC BLOOD PRESSURE: 115 MMHG | WEIGHT: 202 LBS | OXYGEN SATURATION: 97 % | BODY MASS INDEX: 32.47 KG/M2 | DIASTOLIC BLOOD PRESSURE: 77 MMHG | RESPIRATION RATE: 24 BRPM

## 2019-06-12 DIAGNOSIS — S39.012D BACK STRAIN, SUBSEQUENT ENCOUNTER: Primary | ICD-10-CM

## 2019-06-12 NOTE — PROGRESS NOTES
Patient: Mahogany Martin                MRN: [id number]       SSN: [id number]  YOB: 1980        AGE: 45 y.o. SEX: female  Body mass index is 32.6 kg/m². PCP: Nilton Chan MD  06/12/19    Chief Complaint: Lumbago     HISTORY OF PRESENT ILLNESS:  Urbano Mtz returns to the office today for her low back. She has been in a work hardening program.  Her symptoms are improving. Her back pain she says is 0/10 today. She was able to get through the work hardening program where they simulated her work activities completely. Past Medical History:   Diagnosis Date    Diabetes ([address]     Pt. states pre-diabetic    Female bladder prolapse     Thyroid disease     Pt. states they are watching her levels.  Unspecified urinary incontinence     UTI (urinary tract infection)        Family History   Problem Relation Age of Onset    Hypertension Father     Heart Disease Father     Hypertension Paternal Grandfather     Hypertension Mother     Hypertension Brother     Cancer Neg Hx     Diabetes Neg Hx        Current Outpatient Medications   Medication Sig Dispense Refill    promethazine HCl (PHENERGAN RE) Insert  into rectum.  acetaminophen (TYLENOL) 325 mg tablet Take 2 Tabs by mouth every six (6) hours as needed for Pain.  20 Tab 0       No Known Allergies    Past Surgical History:   Procedure Laterality Date    COLONOSCOPY N/A 9/1/2017    COLONOSCOPY, DIAGNOSTIC /c Bx performed by Rachel Millan MD at [facility]      Pt with 2 previous abortions, and multiple surgeries to release adhesions       Social History     Socioeconomic History    Marital status:      Spouse name: Not on file    Number of children: Not on file    Years of education: Not on file    Highest education level: Not on file   Occupational History    Not on file   Social Needs    Financial resource strain: Not on file    Food insecurity:     Worry: Not on file     Inability: Not on file    Transportation needs:     Medical: Not on file     Non-medical: Not on file   Tobacco Use    Smoking status: Never Smoker    Smokeless tobacco: Never Used   Substance and Sexual Activity    Alcohol use: Not Currently    Drug use: No    Sexual activity: Yes     Partners: Male   Lifestyle    Physical activity:     Days per week: Not on file     Minutes per session: Not on file    Stress: Not on file   Relationships    Social connections:     Talks on phone: Not on file     Gets together: Not on file     Attends Yazidism service: Not on file     Active member of club or organization: Not on file     Attends meetings of clubs or organizations: Not on file     Relationship status: Not on file    Intimate partner violence:     Fear of current or ex partner: Not on file     Emotionally abused: Not on file     Physically abused: Not on file     Forced sexual activity: Not on file   Other Topics Concern    Not on file   Social History Narrative    Not on file       REVIEW OF SYSTEMS:      No changes from previous review of systems unless noted. PHYSICAL EXAMINATION:  Visit Vitals  /77   Pulse 72   Resp 24   Ht 5' 6\" (1.676 m)   Wt 202 lb (91.6 kg)   LMP 12/19/2018 (Exact Date)   SpO2 97%   BMI 32.60 kg/m²     Body mass index is 32.6 kg/m². GENERAL: Alert and oriented x3, in no acute distress. HEENT: Normocephalic, atraumatic. RESP: Non labored breathing. SKIN: No rashes or lesions noted. PHYSICAL EXAM:  Physical exam of the low back with no obvious deformity. Nontender over the medial, lateral and midline. Full strength and sensation in the lower extremities. Negative straight leg raise bilaterally. ASSESSMENT AND PLAN:   Shana Hdz has completed her work hardening program from her workers' compensation related on the job low back injury. At this point, she seems to be doing well and I have recommended full return to work starting next week.   I filled out the paperwork for her. I will see her back as needed. She has reached maximum medical improvement with 0 permanent disability and is cleared to return to full work duty full time as of today's visit.       Electronically signed by: Rick Edwards MD

## 2019-06-16 ENCOUNTER — HOSPITAL ENCOUNTER (EMERGENCY)
Age: 39
Discharge: HOME | End: 2019-06-16
Attending: EMERGENCY MEDICINE
Payer: MEDICAID

## 2019-06-16 ENCOUNTER — APPOINTMENT (OUTPATIENT)
Dept: VASCULAR SURGERY | Age: 39
End: 2019-06-16
Attending: EMERGENCY MEDICINE
Payer: MEDICAID

## 2019-06-16 VITALS
SYSTOLIC BLOOD PRESSURE: 135 MMHG | DIASTOLIC BLOOD PRESSURE: 68 MMHG | HEART RATE: 65 BPM | TEMPERATURE: 98.9 F | HEIGHT: 66 IN | BODY MASS INDEX: 32.14 KG/M2 | WEIGHT: 200 LBS | OXYGEN SATURATION: 100 % | RESPIRATION RATE: 16 BRPM

## 2019-06-16 DIAGNOSIS — M79.662 PAIN OF LEFT CALF: Primary | ICD-10-CM

## 2019-06-16 LAB
ANION GAP SERPL CALC-SCNC: 10 MMOL/L (ref 3–18)
BASOPHILS # BLD: 0 K/UL (ref 0–0.1)
BASOPHILS NFR BLD: 0 % (ref 0–2)
BUN SERPL-MCNC: 9 MG/DL (ref 7–18)
BUN/CREAT SERPL: 24 (ref 12–20)
CALCIUM SERPL-MCNC: 9.2 MG/DL (ref 8.5–10.1)
CHLORIDE SERPL-SCNC: 107 MMOL/L (ref 100–108)
CO2 SERPL-SCNC: 22 MMOL/L (ref 21–32)
CREAT SERPL-MCNC: 0.38 MG/DL (ref 0.6–1.3)
DIFFERENTIAL METHOD BLD: ABNORMAL
EOSINOPHIL # BLD: 0.1 K/UL (ref 0–0.4)
EOSINOPHIL NFR BLD: 1 % (ref 0–5)
ERYTHROCYTE [DISTWIDTH] IN BLOOD BY AUTOMATED COUNT: 14 % (ref 11.6–14.5)
GLUCOSE SERPL-MCNC: 96 MG/DL (ref 74–99)
HCT VFR BLD AUTO: 37.9 % (ref 35–45)
HGB BLD-MCNC: 12.9 G/DL (ref 12–16)
LYMPHOCYTES # BLD: 1.6 K/UL (ref 0.9–3.6)
LYMPHOCYTES NFR BLD: 33 % (ref 21–52)
MCH RBC QN AUTO: 29.5 PG (ref 24–34)
MCHC RBC AUTO-ENTMCNC: 34 G/DL (ref 31–37)
MCV RBC AUTO: 86.5 FL (ref 74–97)
MONOCYTES # BLD: 0.6 K/UL (ref 0.05–1.2)
MONOCYTES NFR BLD: 13 % (ref 3–10)
NEUTS SEG # BLD: 2.6 K/UL (ref 1.8–8)
NEUTS SEG NFR BLD: 53 % (ref 40–73)
PLATELET # BLD AUTO: 268 K/UL (ref 135–420)
PMV BLD AUTO: 10.5 FL (ref 9.2–11.8)
POTASSIUM SERPL-SCNC: 4 MMOL/L (ref 3.5–5.5)
RBC # BLD AUTO: 4.38 M/UL (ref 4.2–5.3)
SODIUM SERPL-SCNC: 139 MMOL/L (ref 136–145)
WBC # BLD AUTO: 4.9 K/UL (ref 4.6–13.2)

## 2019-06-16 PROCEDURE — 93971 EXTREMITY STUDY: CPT

## 2019-06-16 PROCEDURE — 80048 BASIC METABOLIC PNL TOTAL CA: CPT

## 2019-06-16 PROCEDURE — 99283 EMERGENCY DEPT VISIT LOW MDM: CPT

## 2019-06-16 PROCEDURE — 74011250637 HC RX REV CODE- 250/637: Performed by: EMERGENCY MEDICINE

## 2019-06-16 PROCEDURE — 85025 COMPLETE CBC W/AUTO DIFF WBC: CPT

## 2019-06-16 RX ORDER — ACETAMINOPHEN 325 MG/1
650 TABLET ORAL
Qty: 20 TAB | Refills: 0 | Status: SHIPPED | OUTPATIENT
Start: 2019-06-16 | End: 2019-10-19

## 2019-06-16 RX ORDER — ACETAMINOPHEN 500 MG
1000 TABLET ORAL
Status: DISCONTINUED | OUTPATIENT
Start: 2019-06-16 | End: 2019-06-16 | Stop reason: HOSPADM

## 2019-06-16 NOTE — ED NOTES
7:25 AM :Pt care assumed from Dr. Bernabe Bullard , ED provider. Pt complaint(s), current treatment plan, progression and available diagnostic results have been discussed thoroughly. Rounding occurred: yes  Intended Disposition: TBD  Pending diagnostic reports and/or labs (please list): Duplex L LE    Patient was seen and examined his left calf pain is 19 weeks pregnant. Patient has no other points related complaints however based on sign out my evaluation will proceed with duplex scanning of the left lower extremity to evaluate for DVT. If this is reassuring we will plan to postop patient care. Jayden Vann DO 7:26 AM

## 2019-06-16 NOTE — ED NOTES
Shannan Buck is a 45 y.o. female that was discharged in stable. Pt was accompanied by partner. Pt is not driving. The patients diagnosis, condition and treatment were explained to  patient and aftercare instructions were given. The patient verbalized understanding. Patient armband removed and shredded.

## 2019-06-16 NOTE — ED PROVIDER NOTES
[facility]  EMERGENCY DEPARTMENT HISTORY AND PHYSICAL EXAM       Date: 2019   Patient Name: Brandon Patton   YOB: 1980  Medical Record Number: [id number]    HISTORY OF PRESENTING ILLNESS:     Brandon Patton is a 45 y.o. female  at 23 weeks with no issues in this pregnancy presenting with the noted PMH to the ED c/o moderate left calf pain which was atraumatic and started yesterday, worse with walking. Denies PMH, meds or allergies. No SOB, CP or other complaints. No hx DVT. Primary Care Provider: Kaden Carlson MD   Specialist:    Past Medical History:   Past Medical History:   Diagnosis Date    Diabetes ([address]     Pt. states pre-diabetic    Female bladder prolapse     Thyroid disease     Pt. states they are watching her levels.  Unspecified urinary incontinence     UTI (urinary tract infection)         Past Surgical History:   Past Surgical History:   Procedure Laterality Date    COLONOSCOPY N/A 2017    COLONOSCOPY, DIAGNOSTIC /c Bx performed by Maddi José MD at [facility] 76      Pt with 2 previous abortions, and multiple surgeries to release adhesions        Social History:   Social History     Tobacco Use    Smoking status: Never Smoker    Smokeless tobacco: Never Used   Substance Use Topics    Alcohol use: Not Currently    Drug use: No        Allergies:   No Known Allergies     REVIEW OF SYSTEMS:  Review of Systems   Constitutional: Negative for chills and fever. HENT: Negative for ear pain and sore throat. Eyes: Negative for pain and visual disturbance. Respiratory: Negative for cough and shortness of breath. Cardiovascular: Negative for chest pain and palpitations. Gastrointestinal: Negative for abdominal pain, diarrhea, nausea and vomiting. Genitourinary: Negative for flank pain. Musculoskeletal: Positive for myalgias. Negative for back pain and neck pain.    Neurological: Negative for syncope and headaches. Psychiatric/Behavioral: Negative for agitation. The patient is not nervous/anxious. PHYSICAL EXAM:  Vitals:    06/16/19 0546   BP: 142/76   Pulse: 74   Resp: 16   Temp: 98.9 °F (37.2 °C)   SpO2: 100%   Weight: 90.7 kg (200 lb)   Height: 5' 6\" (1.676 m)       Physical Exam   Constitutional: She is oriented to person, place, and time. She appears well-developed and well-nourished. No distress. HENT:   Head: Normocephalic and atraumatic. Mouth/Throat: Oropharynx is clear and moist.   Eyes: Pupils are equal, round, and reactive to light. No scleral icterus. Neck: Neck supple. No tracheal deviation present. Cardiovascular: Normal rate, regular rhythm and intact distal pulses. No murmur heard. Pulmonary/Chest: Effort normal and breath sounds normal. No respiratory distress. Abdominal: Soft. She exhibits no distension. There is no tenderness. There is no rebound and no guarding. Gravid uterus palpated 2 inches below umbilicus   Musculoskeletal: Normal range of motion. She exhibits no edema, tenderness or deformity. Calves appear symmetric, no erythema warmth or asymmetry. Neurological: She is alert and oriented to person, place, and time. No cranial nerve deficit. No gross neuro deficit   Skin: Skin is warm and dry. No rash noted. She is not diaphoretic. No erythema. Psychiatric: She has a normal mood and affect. Nursing note and vitals reviewed. Medications - No data to display    RESULTS:    Labs -   Labs Reviewed - No data to display    Radiologic Studies -  No results found. MEDICAL DECISION MAKING    DDX: DVT versus muscle strain. 45 y.o. female with noted past medical history who presented with left calf pain starting yesterday which was atraumatic in the setting of pregnancy therefore ultrasound will need to be performed to rule out DVT. Patient has no systemic signs or symptoms.   Ultrasound tech will arrive at 7 AM, will handoff to oncoming provider pending imaging. Tylenol for pain, basic labs in case anti-coagulation needed. Diagnosis   Clinical Impression:   Acute Right calf pain.     Follow-up Information    None         There are no discharge medications for this patient.      _______________________________   Attestations:

## 2019-07-10 ENCOUNTER — OFFICE VISIT (OUTPATIENT)
Dept: ORTHOPEDIC SURGERY | Age: 39
End: 2019-07-10

## 2019-07-10 VITALS
BODY MASS INDEX: 31.82 KG/M2 | WEIGHT: 198 LBS | SYSTOLIC BLOOD PRESSURE: 125 MMHG | HEIGHT: 66 IN | DIASTOLIC BLOOD PRESSURE: 82 MMHG | OXYGEN SATURATION: 100 % | HEART RATE: 81 BPM | RESPIRATION RATE: 18 BRPM

## 2019-07-10 DIAGNOSIS — S39.012D BACK STRAIN, SUBSEQUENT ENCOUNTER: Primary | ICD-10-CM

## 2019-07-10 NOTE — PROGRESS NOTES
Patient: Corrine Haque                MRN: [id number]       SSN: [id number]  YOB: 1980        AGE: 45 y.o. SEX: female  Body mass index is 31.96 kg/m². PCP: Gavi Kitchen MD  07/10/19    Chief Complaint: Low back pain    HISTORY OF PRESENT ILLNESS:  Veena Fairbanks returns to the office today for an unexpected visit. At her last visit, she had gone through work hardening. She had cleared her functional capacity examination with no disability and was cleared to return back to work. She went back to work and started to complain of back pain again. She did not report a new injury. She did not report any new symptoms to her workers compensation. She comes in today saying that her back pain never actually went away. Unfortunately, I do not have any documentation of that. My documentation as I reviewed it reports a steady improvement in her back pain up until her last several visits where she had no back pain in the office reported and she cleared her functional capacity evaluation and reached maximal medical improvement with no disability rating. Today, she is complaining of similar complaints of right sided low back pain that radiates into her buttock. She has had an MRI, which was negative. She has had no new injury. She is here today with her workers compensation  also. Past Medical History:   Diagnosis Date    Diabetes ([address]     Pt. states pre-diabetic    Female bladder prolapse     Thyroid disease     Pt. states they are watching her levels.     Unspecified urinary incontinence     UTI (urinary tract infection)        Family History   Problem Relation Age of Onset    Hypertension Father     Heart Disease Father     Hypertension Paternal Grandfather     Hypertension Mother     Hypertension Brother     Cancer Neg Hx     Diabetes Neg Hx        Current Outpatient Medications   Medication Sig Dispense Refill    acetaminophen (TYLENOL) 325 mg tablet Take 2 Tabs by mouth every four (4) hours as needed for Pain. 20 Tab 0       No Known Allergies    Past Surgical History:   Procedure Laterality Date    COLONOSCOPY N/A 9/1/2017    COLONOSCOPY, DIAGNOSTIC /c Bx performed by Aydee Lucas MD at [facility] 76      Pt with 2 previous abortions, and multiple surgeries to release adhesions       Social History     Socioeconomic History    Marital status:      Spouse name: Not on file    Number of children: Not on file    Years of education: Not on file    Highest education level: Not on file   Occupational History    Not on file   Social Needs    Financial resource strain: Not on file    Food insecurity:     Worry: Not on file     Inability: Not on file    Transportation needs:     Medical: Not on file     Non-medical: Not on file   Tobacco Use    Smoking status: Never Smoker    Smokeless tobacco: Never Used   Substance and Sexual Activity    Alcohol use: Not Currently    Drug use: No    Sexual activity: Yes     Partners: Male   Lifestyle    Physical activity:     Days per week: Not on file     Minutes per session: Not on file    Stress: Not on file   Relationships    Social connections:     Talks on phone: Not on file     Gets together: Not on file     Attends Tenriism service: Not on file     Active member of club or organization: Not on file     Attends meetings of clubs or organizations: Not on file     Relationship status: Not on file    Intimate partner violence:     Fear of current or ex partner: Not on file     Emotionally abused: Not on file     Physically abused: Not on file     Forced sexual activity: Not on file   Other Topics Concern    Not on file   Social History Narrative    Not on file       REVIEW OF SYSTEMS:      No changes from previous review of systems unless noted.     PHYSICAL EXAMINATION:  Visit Vitals  /82   Pulse 81   Resp 18   Ht 5' 6\" (1.676 m)   Wt 198 lb (89.8 kg)   LMP 12/19/2018 (Exact Date)   SpO2 100%   BMI 31.96 kg/m²     Body mass index is 31.96 kg/m². GENERAL: Alert and oriented x3, in no acute distress. HEENT: Normocephalic, atraumatic. RESP: Non labored breathing. SKIN: No rashes or lesions noted. HISTORY OF PRESENT ILLNESS:  Physical exam of the lumbar spine does not reveal any focal neurological deficits. She is nontender in the midline. She has mild paraspinal tenderness to palpation on the right side that radiates into her buttock. Nontender on the left. Full spine range of motion with flexion and extension. No focal neurological deficits from L2 - S1. She has 5/5 strength in the lower extremities. Mildly positive straight leg raise bilaterally. ASSESSMENT AND PLAN:   Win Cuello returns to the office today with a similar complaint to her previous complaint, which is low back pain. She says that this never was 100% better, however , the documentation clearly shows that she met maximal medical improvement with no disability and was cleared to return to full work after functional capacity evaluation. She has reported no new injury, so unfortunately I would revert back to my previous recommendation, which is return to full duty. I discussed this with her and her workers compensation  who was in the office today. My other recommendation is that she get a second opinion regarding her injury and work status, as well as fitness to return to work, any disability and medical improvement. She has reached the point of maximal medical improvement. At this point, as she does not have a new injury and she has been fully cleared previously to return to work, from my standpoint, I would stick by that recommendation and opinion, which she will be set up through her workers compensation  to make any further evaluations, as well as recommendations for treatment moving forward.        Total visit time including discussing findings, recommendations, past treatment, chart review and discussion with patient and  totaled over 30 minutes.      Electronically signed by: Jesse Lezama MD

## 2019-10-15 PROBLEM — Z34.90 PREGNANCY: Status: ACTIVE | Noted: 2019-10-15

## 2019-10-19 PROBLEM — Z34.90 PREGNANCY: Status: RESOLVED | Noted: 2019-10-15 | Resolved: 2019-10-19

## 2020-07-02 ENCOUNTER — HOSPITAL ENCOUNTER (OUTPATIENT)
Dept: GENERAL RADIOLOGY | Age: 40
Discharge: HOME | End: 2020-07-02
Payer: MEDICAID

## 2020-07-02 DIAGNOSIS — M25.579 ANKLE PAIN: ICD-10-CM

## 2020-07-02 PROCEDURE — 73610 X-RAY EXAM OF ANKLE: CPT

## 2020-08-10 ENCOUNTER — HOSPITAL ENCOUNTER (EMERGENCY)
Age: 40
Discharge: LEFT BEFORE DISCHARGE | End: 2020-08-11
Attending: EMERGENCY MEDICINE | Admitting: EMERGENCY MEDICINE
Payer: MEDICAID

## 2020-08-10 VITALS
HEART RATE: 68 BPM | HEIGHT: 66 IN | SYSTOLIC BLOOD PRESSURE: 140 MMHG | DIASTOLIC BLOOD PRESSURE: 92 MMHG | TEMPERATURE: 98 F | RESPIRATION RATE: 16 BRPM | OXYGEN SATURATION: 100 % | BODY MASS INDEX: 32.14 KG/M2 | WEIGHT: 200 LBS

## 2020-08-10 PROCEDURE — 75810000275 HC EMERGENCY DEPT VISIT NO LEVEL OF CARE

## 2020-08-10 PROCEDURE — 99281 EMR DPT VST MAYX REQ PHY/QHP: CPT

## 2020-08-11 NOTE — ED TRIAGE NOTES
Pt presents via triage from home with complaints of diarrhea since aprox noon today. Pt states \"It didn't get better after taking pepto\" Pt also reports left shoulder and neck pain, described as a \"Pinching feeling\" that began this morning when she woke up.

## 2020-10-02 ENCOUNTER — OFFICE VISIT (OUTPATIENT)
Dept: ORTHOPEDIC SURGERY | Age: 40
End: 2020-10-02
Payer: MEDICAID

## 2020-10-02 VITALS — WEIGHT: 200 LBS | HEIGHT: 66 IN | BODY MASS INDEX: 32.14 KG/M2

## 2020-10-02 DIAGNOSIS — M25.512 ACUTE PAIN OF LEFT SHOULDER: Primary | ICD-10-CM

## 2020-10-02 DIAGNOSIS — M54.2 NECK PAIN: ICD-10-CM

## 2020-10-02 PROCEDURE — 99204 OFFICE O/P NEW MOD 45 MIN: CPT | Performed by: ORTHOPAEDIC SURGERY

## 2020-10-02 NOTE — PATIENT INSTRUCTIONS
Knee Pain or Injury: Care Instructions  Your Care Instructions     Injuries are a common cause of knee problems. Sudden (acute) injuries may be caused by a direct blow to the knee. They can also be caused by abnormal twisting, bending, or falling on the knee. Pain, bruising, or swelling may be severe, and may start within minutes of the injury. Overuse is another cause of knee pain. Other causes are climbing stairs, kneeling, and other activities that use the knee. Everyday wear and tear, especially as you get older, also can cause knee pain. Rest, along with home treatment, often relieves pain and allows your knee to heal. If you have a serious knee injury, you may need tests and treatment. Follow-up care is a key part of your treatment and safety. Be sure to make and go to all appointments, and call your doctor if you are having problems. It's also a good idea to know your test results and keep a list of the medicines you take. How can you care for yourself at home? · Be safe with medicines. Read and follow all instructions on the label. ? If the doctor gave you a prescription medicine for pain, take it as prescribed. ? If you are not taking a prescription pain medicine, ask your doctor if you can take an over-the-counter medicine. · Rest and protect your knee. Take a break from any activity that may cause pain. · Put ice or a cold pack on your knee for 10 to 20 minutes at a time. Put a thin cloth between the ice and your skin. · Prop up a sore knee on a pillow when you ice it or anytime you sit or lie down for the next 3 days. Try to keep it above the level of your heart. This will help reduce swelling. · If your knee is not swollen, you can put moist heat, a heating pad, or a warm cloth on your knee. · If your doctor recommends an elastic bandage, sleeve, or other type of support for your knee, wear it as directed.   · Follow your doctor's instructions about how much weight you can put on your leg. Use a cane, crutches, or a walker as instructed. · Follow your doctor's instructions about activity during your healing process. If you can do mild exercise, slowly increase your activity. · Reach and stay at a healthy weight. Extra weight can strain the joints, especially the knees and hips, and make the pain worse. Losing even a few pounds may help. When should you call for help? Call 911 anytime you think you may need emergency care. For example, call if:    · You have symptoms of a blood clot in your lung (called a pulmonary embolism). These may include:  ? Sudden chest pain. ? Trouble breathing. ? Coughing up blood. Call your doctor now or seek immediate medical care if:    · You have severe or increasing pain.     · Your leg or foot turns cold or changes color.     · You cannot stand or put weight on your knee.     · Your knee looks twisted or bent out of shape.     · You cannot move your knee.     · You have signs of infection, such as:  ? Increased pain, swelling, warmth, or redness. ? Red streaks leading from the knee. ? Pus draining from a place on your knee. ? A fever.     · You have signs of a blood clot in your leg (called a deep vein thrombosis), such as:  ? Pain in your calf, back of the knee, thigh, or groin. ? Redness and swelling in your leg or groin. Watch closely for changes in your health, and be sure to contact your doctor if:    · You have tingling, weakness, or numbness in your knee.     · You have any new symptoms, such as swelling.     · You have bruises from a knee injury that last longer than 2 weeks.     · You do not get better as expected. Where can you learn more? Go to http://www.gray.com/  Enter K195 in the search box to learn more about \"Knee Pain or Injury: Care Instructions. \"  Current as of: June 26, 2019               Content Version: 12.6  © 5724-4212 [employer].    Care instructions adapted under license by [facility] (which disclaims liability or warranty for this information). If you have questions about a medical condition or this instruction, always ask your healthcare professional. [address] any warranty or liability for your use of this information. Neck Pain: Care Instructions  Your Care Instructions     You can have neck pain anywhere from the bottom of your head to the top of your shoulders. It can spread to the upper back or arms. Injuries, painting a ceiling, sleeping with your neck twisted, staying in one position for too long, and many other activities can cause neck pain. Most neck pain gets better with home care. Your doctor may recommend medicine to relieve pain or relax your muscles. He or she may suggest exercise and physical therapy to increase flexibility and relieve stress. You may need to wear a special (cervical) collar to support your neck for a day or two. Follow-up care is a key part of your treatment and safety. Be sure to make and go to all appointments, and call your doctor if you are having problems. It's also a good idea to know your test results and keep a list of the medicines you take. How can you care for yourself at home? · Try using a heating pad on a low or medium setting for 15 to 20 minutes every 2 or 3 hours. Try a warm shower in place of one session with the heating pad. · You can also try an ice pack for 10 to 15 minutes every 2 to 3 hours. Put a thin cloth between the ice and your skin. · Take pain medicines exactly as directed. ? If the doctor gave you a prescription medicine for pain, take it as prescribed. ? If you are not taking a prescription pain medicine, ask your doctor if you can take an over-the-counter medicine. · If your doctor recommends a cervical collar, wear it exactly as directed. When should you call for help?    Call your doctor now or seek immediate medical care if:    · You have new or worsening numbness in your arms, buttocks or legs.     · You have new or worsening weakness in your arms or legs. (This could make it hard to stand up.)     · You lose control of your bladder or bowels. Watch closely for changes in your health, and be sure to contact your doctor if:    · Your neck pain is getting worse.     · You are not getting better after 1 week.     · You do not get better as expected. Where can you learn more? Go to http://www.fuentes.com/  Enter V723 in the search box to learn more about \"Neck Pain: Care Instructions. \"  Current as of: March 2, 2020               Content Version: 12.6  © 2085-2182 [employer]. Care instructions adapted under license by [employer] (which disclaims liability or warranty for this information). If you have questions about a medical condition or this instruction, always ask your healthcare professional. [address] any warranty or liability for your use of this information.

## 2020-10-02 NOTE — PROGRESS NOTES
Name: Loki Doty    : 1980     Service Dept: [address] and Sports Medicine    Patient's Pharmacies:    [facility]/pharmacy #[id number]- [address] [facility], [address] [facility] [place] [address]  [address]  ([address])  [address] [address]  Phone: [phone] Fax: [phone]       Chief Complaint   Patient presents with    Elbow Pain     left    Shoulder Pain     left        Visit Vitals  Ht 5' 6\" (1.676 m)   Wt 200 lb (90.7 kg)   BMI 32.28 kg/m²        No Known Allergies     Current Outpatient Medications   Medication Sig Dispense Refill    ibuprofen (MOTRIN) 600 mg tablet Take 1 Tab by mouth every six (6) hours as needed for Pain. 30 Tab 1    prenatal vit calc,iron,folic (PRENATAL VITAMIN PO) Take  by mouth.           Patient Active Problem List   Diagnosis Code    Cellulitis L03.90    Anemia D64.9    Chest pain R07.9    Delivery of pregnancy by  section O82        Family History   Problem Relation Age of Onset    Hypertension Father     Heart Disease Father     Hypertension Paternal Grandfather     Hypertension Mother     Hypertension Brother     Cancer Neg Hx     Diabetes Neg Hx         Social History     Socioeconomic History    Marital status:      Spouse name: Not on file    Number of children: Not on file    Years of education: Not on file    Highest education level: Not on file   Tobacco Use    Smoking status: Never Smoker    Smokeless tobacco: Never Used   Substance and Sexual Activity    Alcohol use: Not Currently    Drug use: No    Sexual activity: Yes     Partners: Male   Other Topics Concern        Past Surgical History:   Procedure Laterality Date    COLONOSCOPY N/A 2017    COLONOSCOPY, DIAGNOSTIC /c Bx performed by Michele Schmidt MD at [facility] 76      Pt with 2 previous abortions, and multiple surgeries to release adhesions        Past Medical History:   Diagnosis Date    Anemia     Diabetes ([facility] [address] Pt. states pre-diabetic    Female bladder prolapse     PCOS (polycystic ovarian syndrome)     Thyroid disease     Pt. states they are watching her levels.  Unspecified urinary incontinence     UTI (urinary tract infection)         I have reviewed and agree with [address] and ROS and intake form in chart and the record. Review of Systems:   Patient is a pleasant appearing individual, appropriately dressed, well hydrated, well nourished, who is alert, appropriately oriented for age, and in no acute distress with a normal gait and normal affect who does not appear to be in any significant pain. Physical Exam:  Left Shoulder - Grossly neurovascularly intact. Range of motion-Full passive, Active with impingement. Anterior apprehension, No Point tenderness, Strength-weakness with abduction, palpable popping felt on exam, No skin lesion are identified, No instabilty is noted, positive apprehension. No Swelling. Right Shoulder - Grossly neurovascularly intact, Full Range of motion, No point tenderness, No weakness, No skin lesions, No Instability, No apprehension, No swelling. patient's neck and shoulders are grossly neurovascularly intact. The patient's neck has decreased motion in all planes both passively and actively. Good strength against resistance with lateralized pin-point tenderness to palpation radiating to the shoulder. Positive Spurlings test. Positive forward flexion test. No swelling or instablity. No skin lesions noted. Encounter Diagnoses     ICD-10-CM ICD-9-CM   1. Acute pain of left shoulder  M25.512 719.41   2. Neck pain  M54.2 723.1          HPI:  The patient is here with a chief complaint of left shoulder pain, elbow pain, and numbness and tingling, but little bit better. Movement makes it worse. Pain is 5/10. She has got a history of shoulder dislocation and numbness and tingling. ROS:  10-point review of systems is unremarkable.     X-rays of the left shoulder and left elbow done at [facility] are unremarkable. Assessment/Plan:  1. Left shoulder instability and numbness and tingling. I would like to go and get an EMG of the left upper extremity and also get the patient set up for an MR arthrogram of the left shoulder. I will see the patient post both. If the patient gets worse, she is to give me a call. No restrictions in the meantime. Return to Office: Follow-up and Dispositions    · Return for POST EMG. Scribed by Maribeth Amato LPN as dictated by [facility] NICK Mckeon MD.    Documentation True and Accepted Robbin Mckeon MD

## 2020-10-02 NOTE — PROGRESS NOTES
Providers protocol for the intake nurse to complete in patient's chart:    Roney Molina presents today for   Chief Complaint   Patient presents with    Elbow Pain     left    Shoulder Pain     left       Reason for visit - from intake sheet  Pain assessment  -  from intake sheet  Height - from intake sheet  Weight - from intake sheet  Medical Conditions - from intake sheet  Family medical history - from intake sheet  Social History, alcohol, smoking - from the intake sheet  The PHQ2 only questions - from the intake sheet  Learning Assessment - from the intake sheet    Temperature is taken by MAUREEN WOLFF [facility] - written on intake sheet  [facility]    Provider will complete patient's chart

## 2020-10-16 ENCOUNTER — HOSPITAL ENCOUNTER (OUTPATIENT)
Dept: MRI IMAGING | Age: 40
Discharge: HOME | End: 2020-10-16
Payer: MEDICAID

## 2020-10-16 ENCOUNTER — HOSPITAL ENCOUNTER (OUTPATIENT)
Dept: GENERAL RADIOLOGY | Age: 40
Discharge: HOME | End: 2020-10-16
Payer: MEDICAID

## 2020-10-16 DIAGNOSIS — M54.2 NECK PAIN: ICD-10-CM

## 2020-10-16 DIAGNOSIS — M25.512 ACUTE PAIN OF LEFT SHOULDER: ICD-10-CM

## 2020-10-16 PROCEDURE — 74011250636 HC RX REV CODE- 250/636

## 2020-10-16 PROCEDURE — 74011000636 HC RX REV CODE- 636

## 2020-10-16 PROCEDURE — 74011000250 HC RX REV CODE- 250

## 2020-10-16 PROCEDURE — 73222 MRI JOINT UPR EXTREM W/DYE: CPT

## 2020-10-16 PROCEDURE — A9575 INJ GADOTERATE MEGLUMI 0.1ML: HCPCS

## 2020-10-16 PROCEDURE — 77002 NEEDLE LOCALIZATION BY XRAY: CPT

## 2020-10-16 RX ORDER — GADOTERATE MEGLUMINE 376.9 MG/ML
5 INJECTION INTRAVENOUS
Status: COMPLETED | OUTPATIENT
Start: 2020-10-16 | End: 2020-10-16

## 2020-10-16 RX ORDER — LIDOCAINE HYDROCHLORIDE 10 MG/ML
30 INJECTION, SOLUTION EPIDURAL; INFILTRATION; INTRACAUDAL; PERINEURAL ONCE
Status: COMPLETED | OUTPATIENT
Start: 2020-10-16 | End: 2020-10-16

## 2020-10-16 RX ORDER — SODIUM CHLORIDE 9 MG/ML
10 INJECTION INTRAMUSCULAR; INTRAVENOUS; SUBCUTANEOUS
Status: COMPLETED | OUTPATIENT
Start: 2020-10-16 | End: 2020-10-16

## 2020-10-16 RX ADMIN — SODIUM CHLORIDE 10 ML: 9 INJECTION, SOLUTION INTRAMUSCULAR; INTRAVENOUS; SUBCUTANEOUS at 10:40

## 2020-10-16 RX ADMIN — LIDOCAINE HYDROCHLORIDE 10 ML: 10 INJECTION, SOLUTION EPIDURAL; INFILTRATION; INTRACAUDAL; PERINEURAL at 10:40

## 2020-10-16 RX ADMIN — GADOTERATE MEGLUMINE 0.15 ML: 376.9 INJECTION INTRAVENOUS at 10:40

## 2020-10-16 RX ADMIN — IOPAMIDOL 10 ML: 408 INJECTION, SOLUTION INTRATHECAL at 10:40

## 2020-10-23 ENCOUNTER — OFFICE VISIT (OUTPATIENT)
Dept: ORTHOPEDIC SURGERY | Age: 40
End: 2020-10-23
Payer: MEDICAID

## 2020-10-23 VITALS — WEIGHT: 200 LBS | BODY MASS INDEX: 32.14 KG/M2 | HEIGHT: 66 IN

## 2020-10-23 DIAGNOSIS — M25.512 LEFT SHOULDER PAIN, UNSPECIFIED CHRONICITY: Primary | ICD-10-CM

## 2020-10-23 PROCEDURE — 99213 OFFICE O/P EST LOW 20 MIN: CPT | Performed by: ORTHOPAEDIC SURGERY

## 2020-10-23 RX ORDER — LANOLIN ALCOHOL/MO/W.PET/CERES
CREAM (GRAM) TOPICAL
COMMUNITY
End: 2022-10-19

## 2020-10-23 RX ORDER — ERGOCALCIFEROL 1.25 MG/1
CAPSULE ORAL
COMMUNITY

## 2020-10-23 RX ORDER — BUDESONIDE 3 MG/1
CAPSULE, COATED PELLETS ORAL
COMMUNITY
End: 2022-10-19

## 2020-10-23 RX ORDER — LEVOTHYROXINE SODIUM 50 UG/1
TABLET ORAL
COMMUNITY
End: 2022-10-19

## 2020-10-23 RX ORDER — NORETHINDRONE ACETATE AND ETHINYL ESTRADIOL, ETHINYL ESTRADIOL AND FERROUS FUMARATE 1MG-10(24)
KIT ORAL
COMMUNITY
End: 2021-03-25 | Stop reason: ALTCHOICE

## 2020-10-23 RX ORDER — DOCUSATE SODIUM 100 MG/1
CAPSULE, LIQUID FILLED ORAL
COMMUNITY
Start: 2020-07-27 | End: 2022-10-19

## 2020-10-23 RX ORDER — CHOLECALCIFEROL (VITAMIN D3) 125 MCG
CAPSULE ORAL
COMMUNITY

## 2020-10-23 RX ORDER — ETODOLAC 400 MG/1
TABLET, FILM COATED ORAL
COMMUNITY
Start: 2020-08-17 | End: 2022-10-19

## 2020-10-23 RX ORDER — DICYCLOMINE HYDROCHLORIDE 20 MG/1
TABLET ORAL
COMMUNITY
End: 2022-10-19

## 2020-10-23 RX ORDER — MESALAMINE 500 MG/1
CAPSULE, EXTENDED RELEASE ORAL
COMMUNITY
End: 2022-10-19

## 2020-10-23 RX ORDER — SWAB
1 SWAB, NON-MEDICATED MISCELLANEOUS
COMMUNITY
End: 2022-10-19

## 2020-10-23 NOTE — PATIENT INSTRUCTIONS
Shoulder Pain: Care Instructions  Your Care Instructions     You can hurt your shoulder by using it too much during an activity, such as fishing or baseball. It can also happen as part of the everyday wear and tear of getting older. Shoulder injuries can be slow to heal, but your shoulder should get better with time. Your doctor may recommend a sling to rest your shoulder. If you have injured your shoulder, you may need testing and treatment. Follow-up care is a key part of your treatment and safety. Be sure to make and go to all appointments, and call your doctor if you are having problems. It's also a good idea to know your test results and keep a list of the medicines you take. How can you care for yourself at home? · Take pain medicines exactly as directed. ? If the doctor gave you a prescription medicine for pain, take it as prescribed. ? If you are not taking a prescription pain medicine, ask your doctor if you can take an over-the-counter medicine. ? Do not take two or more pain medicines at the same time unless the doctor told you to. Many pain medicines contain acetaminophen, which is Tylenol. Too much acetaminophen (Tylenol) can be harmful. · If your doctor recommends that you wear a sling, use it as directed. Do not take it off before your doctor tells you to. · Put ice or a cold pack on the sore area for 10 to 20 minutes at a time. Put a thin cloth between the ice and your skin. · If there is no swelling, you can put moist heat, a heating pad, or a warm cloth on your shoulder. Some doctors suggest alternating between hot and cold. · Rest your shoulder for a few days. If your doctor recommends it, you can then begin gentle exercise of the shoulder, but do not lift anything heavy. When should you call for help? Call 911 anytime you think you may need emergency care. For example, call if:    · You have chest pain or pressure. This may occur with:  ? Sweating. ?  Shortness of breath. ? Nausea or vomiting. ? Pain that spreads from the chest to the neck, jaw, or one or both shoulders or arms. ? Dizziness or lightheadedness. ? A fast or uneven pulse. After calling 911, chew 1 adult-strength aspirin. Wait for an ambulance. Do not try to drive yourself.     · Your arm or hand is cool or pale or changes color. Call your doctor now or seek immediate medical care if:    · You have signs of infection, such as:  ? Increased pain, swelling, warmth, or redness in your shoulder. ? Red streaks leading from a place on your shoulder. ? Pus draining from an area of your shoulder. ? Swollen lymph nodes in your neck, armpits, or groin. ? A fever. Watch closely for changes in your health, and be sure to contact your doctor if:    · You cannot use your shoulder.     · Your shoulder does not get better as expected. Where can you learn more? Go to http://www.fuentes.com/  Enter H996 in the search box to learn more about \"Shoulder Pain: Care Instructions. \"  Current as of: March 2, 2020               Content Version: 12.6  © 4972-8505 [employer]. Care instructions adapted under license by [employer] (which disclaims liability or warranty for this information). If you have questions about a medical condition or this instruction, always ask your healthcare professional. [place] [address] any warranty or liability for your use of this information.

## 2020-10-23 NOTE — PROGRESS NOTES
Name: Honey Tsai    : 1980     Service Dept: [address] and Sports Medicine    Patient's Pharmacies:    [facility]/pharmacy #[id number]- [address]  ([address])  [address]  [address]  Phone: [phone] Fax: [phone]       Chief Complaint   Patient presents with    Shoulder Pain     left        Visit Vitals  Ht 5' 6\" (1.676 m)   Wt 200 lb (90.7 kg)   BMI 32.28 kg/m²        No Known Allergies     Current Outpatient Medications   Medication Sig Dispense Refill    budesonide (ENTOCORT EC) 3 mg capsule budesonide DR - ER 3 mg capsule,delayed,extended release   TAKE 1 CAPSULE BY MOUTH THREE TIMES DAILY      cholecalciferol (VITAMIN D3) (5000 Units /125 mcg) capsule Take  by mouth.  dicyclomine (BENTYL) 20 mg tablet dicyclomine 20 mg tablet   TAKE 1 (ONE) TABLET BY MOUTH EVERY SIX HOURS AS NEEDED      docusate sodium (COLACE) 100 mg capsule TAKE 1 CAPSULE BY MOUTH TWICE A DAY      ergocalciferol (ERGOCALCIFEROL) 1,250 mcg (50,000 unit) capsule ergocalciferol (vitamin D2) 1,250 mcg (50,000 unit) capsule   TAKE ONE CAPSULE BY MOUTH EVERY WEEK      etodolac (LODINE) 400 mg tablet TAKE 1 TABLET BY MOUTH TWICE A DAY      ferrous sulfate 325 mg (65 mg iron) tablet ferrous sulfate 325 mg (65 mg iron) tablet   TAKE 1 TABLET BY MOUTH DAILY      levothyroxine (synthroid) 50 mcg tablet Take  by mouth.  mesalamine (Pentasa) 500 mg CR capsule Pentasa 500 mg capsule,controlled release   TAKE 2 CAPSULES BY MOUTH TWICE DAILY      norethindrone-e.estradioL-iron (Lo Loestrin Fe) 1 mg-10 mcg (24)/10 mcg (2) tab Take  by mouth.  prenatal vit-iron fumarate-fa (PRENATAL PLUS with IRON) 28 mg iron- 800 mcg tab Take 1 Tab by mouth.  ibuprofen (MOTRIN) 600 mg tablet Take 1 Tab by mouth every six (6) hours as needed for Pain. 30 Tab 1    prenatal vit calc,iron,folic (PRENATAL VITAMIN PO) Take  by mouth.           Patient Active Problem List   Diagnosis Code    Cellulitis L03.90    Anemia D64.9    Chest pain R07.9    Delivery of pregnancy by  section O82        Family History   Problem Relation Age of Onset    Hypertension Father     Heart Disease Father     Hypertension Paternal Grandfather     Hypertension Mother     Hypertension Brother     Cancer Neg Hx     Diabetes Neg Hx         Social History     Socioeconomic History    Marital status:      Spouse name: Not on file    Number of children: Not on file    Years of education: Not on file    Highest education level: Not on file   Tobacco Use    Smoking status: Never Smoker    Smokeless tobacco: Never Used   Substance and Sexual Activity    Alcohol use: Not Currently    Drug use: No    Sexual activity: Yes     Partners: Male   Other Topics Concern        Past Surgical History:   Procedure Laterality Date    COLONOSCOPY N/A 2017    COLONOSCOPY, DIAGNOSTIC /c Bx performed by Nain Felton MD at [facility] 76      Pt with 2 previous abortions, and multiple surgeries to release adhesions        Past Medical History:   Diagnosis Date    Anemia     Diabetes ([address]     Pt. states pre-diabetic    Female bladder prolapse     PCOS (polycystic ovarian syndrome)     Thyroid disease     Pt. states they are watching her levels.  Unspecified urinary incontinence     UTI (urinary tract infection)         I have reviewed and agree with [address] [place] [address] Nw and ROS and intake form in chart and the record. Review of Systems:   Patient is a pleasant appearing individual, appropriately dressed, well hydrated, well nourished, who is alert, appropriately oriented for age, and in no acute distress with a normal gait and normal affect who does not appear to be in any significant pain. Physical Exam:  Right Shoulder - grossly neurovascularly intact.  Full Range of motion, No Weakness with abduction, No Point Tenderness, No skin lesion are identified, No instabilty is noted, No apprehension. No cuts or abrasions are identified. Left Shoulder - grossly neurovascularly intact. Full Range of motion, No Weakness with abduction, No Point Tenderness, No skin lesion are identified, No instabilty is noted, No apprehension. No cuts or abrasions are identified. Encounter Diagnoses     ICD-10-CM ICD-9-CM   1. Left shoulder pain, unspecified chronicity  M25.512 719.41          HPI:  The patient is here with a chief complaint of left shoulder pain, stable, stabbing pain, progressively getting worse. Nothing has helped. Movement makes it worse, but she is better today. MRI is unremarkable except for maybe a little bit of labral irritation. Assessment/Plan:  Plan at this point, activities as tolerated started, weightbearing started. No restriction from my standpoint. We will see the patient back as needed. If the patient gets worse, she is to give me a call. Again, she has not tried any cortisone injection so we may consider doing that and go from there. Return to Office: Follow-up and Dispositions    · Return if symptoms worsen or fail to improve. Scribed by Fredy Aguiar LPN as dictated by [facility] NICK Cabral MD.    Documentation True and Accepted Robbin Cabral MD

## 2021-02-15 ENCOUNTER — TRANSCRIBE ORDER (OUTPATIENT)
Dept: SCHEDULING | Age: 41
End: 2021-02-15

## 2021-02-15 DIAGNOSIS — Z12.31 SCREENING MAMMOGRAM, ENCOUNTER FOR: Primary | ICD-10-CM

## 2021-02-26 ENCOUNTER — OFFICE VISIT (OUTPATIENT)
Dept: ORTHOPEDIC SURGERY | Age: 41
End: 2021-02-26
Payer: MEDICAID

## 2021-02-26 VITALS — HEIGHT: 66 IN | WEIGHT: 200 LBS | BODY MASS INDEX: 32.14 KG/M2

## 2021-02-26 DIAGNOSIS — G56.02 CARPAL TUNNEL SYNDROME ON LEFT: Primary | ICD-10-CM

## 2021-02-26 PROCEDURE — 20526 THER INJECTION CARP TUNNEL: CPT | Performed by: ORTHOPAEDIC SURGERY

## 2021-02-26 PROCEDURE — 99214 OFFICE O/P EST MOD 30 MIN: CPT | Performed by: ORTHOPAEDIC SURGERY

## 2021-02-26 PROCEDURE — 76942 ECHO GUIDE FOR BIOPSY: CPT | Performed by: ORTHOPAEDIC SURGERY

## 2021-02-26 RX ORDER — METFORMIN HYDROCHLORIDE 500 MG/1
TABLET, EXTENDED RELEASE ORAL
COMMUNITY
Start: 2020-12-10

## 2021-02-26 RX ORDER — PREDNISONE 5 MG/1
TABLET ORAL
COMMUNITY
Start: 2021-02-09 | End: 2022-10-19

## 2021-02-26 RX ORDER — TRIAMCINOLONE ACETONIDE 40 MG/ML
40 INJECTION, SUSPENSION INTRA-ARTICULAR; INTRAMUSCULAR ONCE
Status: COMPLETED | OUTPATIENT
Start: 2021-02-26 | End: 2021-02-26

## 2021-02-26 RX ORDER — CYCLOBENZAPRINE HCL 10 MG
TABLET ORAL
COMMUNITY
Start: 2021-02-09 | End: 2022-10-19

## 2021-02-26 RX ORDER — LIDOCAINE HYDROCHLORIDE 10 MG/ML
1 INJECTION INFILTRATION; PERINEURAL ONCE
Status: COMPLETED | OUTPATIENT
Start: 2021-02-26 | End: 2021-02-26

## 2021-02-26 RX ORDER — BUSPIRONE HYDROCHLORIDE 5 MG/1
TABLET ORAL
COMMUNITY
Start: 2020-12-10 | End: 2022-10-19

## 2021-02-26 RX ADMIN — LIDOCAINE HYDROCHLORIDE 1 ML: 10 INJECTION INFILTRATION; PERINEURAL at 09:52

## 2021-02-26 RX ADMIN — TRIAMCINOLONE ACETONIDE 40 MG: 40 INJECTION, SUSPENSION INTRA-ARTICULAR; INTRAMUSCULAR at 09:52

## 2021-02-26 NOTE — PROGRESS NOTES
Name: Magnolia Ramirez    : 1980     Service Dept: [address] and Sports Medicine    Patient's Pharmacies:    [facility]/pharmacy #[id number] [address]  ([address])  [address]  [address]  Phone: [phone] Fax: [phone]       Chief Complaint   Patient presents with    Wrist Pain        Visit Vitals  Ht 5' 6\" (1.676 m)   Wt 200 lb (90.7 kg)   BMI 32.28 kg/m²      No Known Allergies   Current Outpatient Medications   Medication Sig Dispense Refill    busPIRone (BUSPAR) 5 mg tablet TAKE 1 TABLET BY MOUTH TWICE A DAY AS NEEDED      cyclobenzaprine (FLEXERIL) 10 mg tablet TAKE 1 TABLET BY MOUTH TWICE A DAY AS NEEDED      metFORMIN ER (GLUCOPHAGE XR) 500 mg tablet TAKE 1 TABLET BY MOUTH EVERY DAY      predniSONE (STERAPRED) 5 mg dose pack 1 (ONE) PACKAGE BY MOUTH USE AS DIRECTED PER INSTRUCTIONS IN PACK      budesonide (ENTOCORT EC) 3 mg capsule budesonide DR - ER 3 mg capsule,delayed,extended release   TAKE 1 CAPSULE BY MOUTH THREE TIMES DAILY      cholecalciferol (VITAMIN D3) (5000 Units /125 mcg) capsule Take  by mouth.  dicyclomine (BENTYL) 20 mg tablet dicyclomine 20 mg tablet   TAKE 1 (ONE) TABLET BY MOUTH EVERY SIX HOURS AS NEEDED      docusate sodium (COLACE) 100 mg capsule TAKE 1 CAPSULE BY MOUTH TWICE A DAY      ergocalciferol (ERGOCALCIFEROL) 1,250 mcg (50,000 unit) capsule ergocalciferol (vitamin D2) 1,250 mcg (50,000 unit) capsule   TAKE ONE CAPSULE BY MOUTH EVERY WEEK      etodolac (LODINE) 400 mg tablet TAKE 1 TABLET BY MOUTH TWICE A DAY      ferrous sulfate 325 mg (65 mg iron) tablet ferrous sulfate 325 mg (65 mg iron) tablet   TAKE 1 TABLET BY MOUTH DAILY      levothyroxine (synthroid) 50 mcg tablet Take  by mouth.       mesalamine (Pentasa) 500 mg CR capsule Pentasa 500 mg capsule,controlled release   TAKE 2 CAPSULES BY MOUTH TWICE DAILY      norethindrone-e.estradioL-iron (Lo Loestrin Fe) 1 mg-10 mcg (24)/10 mcg (2) tab Take  by mouth.  prenatal vit-iron fumarate-fa (PRENATAL PLUS with IRON) 28 mg iron- 800 mcg tab Take 1 Tab by mouth.  ibuprofen (MOTRIN) 600 mg tablet Take 1 Tab by mouth every six (6) hours as needed for Pain. 30 Tab 1      Patient Active Problem List   Diagnosis Code    Cellulitis L03.90    Anemia D64.9    Chest pain R07.9    Delivery of pregnancy by  section O82      Family History   Problem Relation Age of Onset    Hypertension Father     Heart Disease Father     Hypertension Paternal Grandfather     Hypertension Mother     Hypertension Brother     Cancer Neg Hx     Diabetes Neg Hx       Social History     Socioeconomic History    Marital status:      Spouse name: Not on file    Number of children: Not on file    Years of education: Not on file    Highest education level: Not on file   Tobacco Use    Smoking status: Never Smoker    Smokeless tobacco: Never Used   Substance and Sexual Activity    Alcohol use: Not Currently    Drug use: No    Sexual activity: Yes     Partners: Male   Other Topics Concern      Past Surgical History:   Procedure Laterality Date    COLONOSCOPY N/A 2017    COLONOSCOPY, DIAGNOSTIC /c Bx performed by David Pollock MD at [facility]      Pt with 2 previous abortions, and multiple surgeries to release adhesions      Past Medical History:   Diagnosis Date    Anemia     Diabetes ([facility] [address]     Pt. states pre-diabetic    Female bladder prolapse     PCOS (polycystic ovarian syndrome)     Thyroid disease     Pt. states they are watching her levels.  Unspecified urinary incontinence     UTI (urinary tract infection)         I have reviewed and agree with PFSH and ROS and intake form in chart and the record furthermore I have reviewed prior medical record(s) regarding this patients care during this appointment.      Review of Systems:   Patient is a pleasant appearing individual, appropriately dressed, well hydrated, well nourished, who is alert, appropriately oriented for age, and in no acute distress with a normal gait and normal affect who does not appear to be in any significant pain. Physical Exam:  Left Wrist - Positive Carpal Compression test, Full Range of motion of the wrist, No Instability, Positive for numbness, Mild swelling, Decreased  strength, Positive for muscle atrophy, Good cap refill. Right Wrist - Negative for Carpal Compression test, Ful Range of motion of the wrist, No Instability, No Numbness, No Swelling, No Weakness, No Muscle atrophy, Good cap refill. Procedure Documentation:    I discussed in detail the risks, benefits and complications of an injection which included but are not limited to infection, skin reactions, hot swollen joint, and anaphylaxis with the patient. The patient verbalized understanding and gave informed consent for the injection. The patient's left wrist was prepped using sterile alcohol solution. A sterile needle was inserted into the left wrist and the mixture of 1 mL Lidocaine 1%, 1 mL Kenalog 40 mg was injected under sterile technique. The needle was withdrawn and the puncture site sealed with a Band-Aid. Technique: Under sterile conditions a [employer] ultrasound unit with a variable frequency (7.0-14.0 MHz) linear transducer was used to localize the placement of needle into the left wrist joint. Findings: Successful needle placement for wrist injection. Final images were taken and saved for permanent record. The patient tolerated the injection well. The patient was instructed to call the office immediately if there is any pain, redness, warmth, fever, or chills. Encounter Diagnoses     ICD-10-CM ICD-9-CM   1. Carpal tunnel syndrome on left  G56.02 354.0       HPI:  The patient is here with a chief complaint of left wrist pain, sharp throbbing pain, progressively getting worse, nothing has helped. Pain is 5/10.   Post EMG which shows she has mild carpal tunnel syndrome, continues to have difficulty. Assessment/Plan:  Plan at this point, my recommendation would be for a cortisone injection left wrist.  See the patient back in three weeks. If no better, we may talk to her about other options. Again, EMG was positive for mild carpal tunnel syndrome. As part of continued conservative pain management options the patient was advised to utilize Tylenol or OTC NSAIDS as long as it is not medically contraindicated. Return to Office: Follow-up and Dispositions    · Return in about 3 weeks (around 3/19/2021). Administrations This Visit     lidocaine (XYLOCAINE) 10 mg/mL (1 %) injection 1 mL     Admin Date  02/26/2021 Action  Given Dose  1 mL Route  Other Administered By  Breanna Vick MD          triamcinolone acetonide (KENALOG-40) 40 mg/mL injection 40 mg     Admin Date  02/26/2021 Action  Given Dose  40 mg Route  Other Administered By  Breanna Vick MD               Scribed by Sawyer Arndt MD as dictated by Jennifer Mcginnis. Tricia Flanagan MD.  Documentation True and Accepted Robbin Flanagan MD

## 2021-02-26 NOTE — LETTER
True Rushing 1980  
[id number]  
 
 
2/26/2021 I hereby authorize and direct Robbin Diaz MD, [facility], and whomever he may designate as his associate to perform upon myself the following procedure: 
 
Injection of: Kenalog, Supartz, Euflexxa, Orthovisc in the Right/Left ____________________. If any unforeseen condition arises in the course of the procedure, I further authorize him and his associated and/or assistant(s) to do whatever he/she deems advisable. The nature, purpose, benefits, risks, side effects, likelihood of achieving goals, and potential problems that might occur during recuperation, risks for not receiving the proposed care, treatment and services and alternatives of the procedure have been fully explained to me by my physician including, but not limited to: 
 
Swelling, joint pain, skin pigment changes, worsening of condition, and failure to improve. I acknowledge that no guarantee or assurance has been made to me as to the results that may be obtained or the likelihood of success. _______________________________________ Signature of patient or authorized representative [employer] and [facility] Medicine fax: [phone]

## 2021-02-26 NOTE — PATIENT INSTRUCTIONS
Carpal Tunnel Syndrome: Care Instructions  Overview     Carpal tunnel syndrome is numbness, tingling, weakness, and pain in your hand, wrist, and sometimes forearm. It is caused by pressure on the median nerve. This nerve and several tough tissues called tendons run through a space in the wrist. This space is called the carpal tunnel. The repeated hand motions used in work and some hobbies and sports can put pressure on the median nerve. Pregnancy can cause carpal tunnel syndrome. Several conditions, such as diabetes, arthritis, and an underactive thyroid, can also cause it. You may be able to limit an activity or change the way you do it to reduce your symptoms. You also can take other steps to feel better. If your symptoms are mild, 1 to 2 weeks of home treatment are likely to ease your pain. Surgery is needed only if other treatments do not work. Follow-up care is a key part of your treatment and safety. Be sure to make and go to all appointments, and call your doctor if you are having problems. It's also a good idea to know your test results and keep a list of the medicines you take. How can you care for yourself at home? · If possible, stop or reduce the activity that causes your symptoms. If you cannot stop the activity, take frequent breaks to rest and stretch or change hand positions to do a task. Try switching hands, such as when using a computer mouse. · Try to avoid bending or twisting your wrists. · Ask your doctor if you can take an over-the-counter pain medicine, such as acetaminophen (Tylenol), ibuprofen (Advil, Motrin), or naproxen (Aleve). Be safe with medicines. Read and follow all instructions on the label. · If your doctor prescribes corticosteroid medicine to help reduce pain and swelling, take it exactly as prescribed. Call your doctor if you think you are having a problem with your medicine. · Put ice or a cold pack on your wrist for 10 to 20 minutes at a time to ease pain.  Put a thin cloth between the ice and your skin. · If your doctor or your physical or occupational therapist tells you to wear a wrist splint, wear it as directed to keep your wrist in a neutral position. This also eases pressure on your median nerve. · Ask your doctor whether you should have physical or occupational therapy to learn how to do tasks differently. · Try a yoga class to stretch your muscles and build strength in your hands and wrists. Yoga has been shown to ease carpal tunnel symptoms. To prevent carpal tunnel  · When working at a computer, keep your hands and wrists in line with your forearms. Hold your elbows close to your sides. Take a break every 10 to 15 minutes. · Try these exercises:  ? Warm up: Rotate your wrist up, down, and from side to side. Repeat this 4 times. Stretch your fingers far apart, relax them, then stretch them again. Repeat 4 times. Stretch your thumb by pulling it back gently, holding it, and then releasing it. Repeat 4 times. ? Prayer stretch: Start with your palms together in front of your chest just below your chin. Slowly lower your hands toward your waistline while keeping your hands close to your stomach and your palms together until you feel a mild to moderate stretch under your forearms. Hold for 10 to 20 seconds. Repeat 4 times. ? Wrist flexor stretch: Hold your arm in front of you with your palm up. Bend your wrist, pointing your hand toward the floor. With your other hand, gently bend your wrist further until you feel a mild to moderate stretch in your forearm. Hold for 10 to 20 seconds. Repeat 4 times. ? Wrist extensor stretch: Repeat the steps for the wrist flexor stretch, but begin with your extended hand palm down. · Squeeze a rubber exercise ball several times a day to keep your hands and fingers strong. · Avoid holding objects (such as a book) in one position for a long time. When possible, use your whole hand to grasp an object.  Using just the thumb and index finger can put stress on the wrist.  · Do not smoke. It can make this condition worse by reducing blood flow to the median nerve. If you need help quitting, talk to your doctor about stop-smoking programs and medicines. These can increase your chances of quitting for good. When should you call for help? Watch closely for changes in your health, and be sure to contact your doctor if:    · Your pain or other problems do not get better with home care.     · You want more information about physical or occupational therapy.     · You have side effects of your corticosteroid medicine, such as:  ? Weight gain. ? Mood changes. ? Trouble sleeping. ? Bruising easily.     · You have any other problems with your medicine. Where can you learn more? Go to http://www.gray.com/  Enter R432 in the search box to learn more about \"Carpal Tunnel Syndrome: Care Instructions. \"  Current as of: March 2, 2020               Content Version: 12.6  © 2039-2554 [employer]. Care instructions adapted under license by [employer] (which disclaims liability or warranty for this information). If you have questions about a medical condition or this instruction, always ask your healthcare professional. [address] [address] any warranty or liability for your use of this information.

## 2021-03-23 ENCOUNTER — HOSPITAL ENCOUNTER (EMERGENCY)
Age: 41
Discharge: HOME | End: 2021-03-23
Attending: EMERGENCY MEDICINE
Payer: MEDICAID

## 2021-03-23 ENCOUNTER — APPOINTMENT (OUTPATIENT)
Dept: GENERAL RADIOLOGY | Age: 41
End: 2021-03-23
Attending: EMERGENCY MEDICINE
Payer: MEDICAID

## 2021-03-23 VITALS
OXYGEN SATURATION: 99 % | DIASTOLIC BLOOD PRESSURE: 75 MMHG | HEART RATE: 62 BPM | BODY MASS INDEX: 33.75 KG/M2 | RESPIRATION RATE: 16 BRPM | HEIGHT: 66 IN | WEIGHT: 210 LBS | SYSTOLIC BLOOD PRESSURE: 170 MMHG | TEMPERATURE: 98.9 F

## 2021-03-23 DIAGNOSIS — M25.512 ACUTE PAIN OF LEFT SHOULDER: ICD-10-CM

## 2021-03-23 DIAGNOSIS — R03.0 ELEVATED BLOOD PRESSURE READING: ICD-10-CM

## 2021-03-23 DIAGNOSIS — S46.912A LEFT SHOULDER STRAIN, INITIAL ENCOUNTER: Primary | ICD-10-CM

## 2021-03-23 DIAGNOSIS — S43.432S LABRAL TEAR OF SHOULDER, LEFT, SEQUELA: ICD-10-CM

## 2021-03-23 PROCEDURE — 99283 EMERGENCY DEPT VISIT LOW MDM: CPT

## 2021-03-23 PROCEDURE — 73030 X-RAY EXAM OF SHOULDER: CPT

## 2021-03-23 RX ORDER — HYDROCODONE BITARTRATE AND ACETAMINOPHEN 5; 325 MG/1; MG/1
1 TABLET ORAL
Qty: 12 TAB | Refills: 0 | Status: SHIPPED | OUTPATIENT
Start: 2021-03-23 | End: 2021-03-26

## 2021-03-23 NOTE — ED TRIAGE NOTES
Left shoulder pain x 2 weeks. States injured while playing kickball. Has had MRI in past (Dr. Jori Diaz) for similar injury and was told there was ligament tear.

## 2021-03-23 NOTE — ED PROVIDER NOTES
EMERGENCY DEPARTMENT HISTORY AND PHYSICAL EXAM    8:18 AM      Date: 3/23/2021  Patient Name: Sarah Beth Rose    History of Presenting Illness     Chief Complaint   Patient presents with    Shoulder Pain     left         History Provided By: Patient    Additional History (Context): Sarah Beth Rose is a 36 y.o. female with Past medical history of anemia, diabetes, short left shoulder injury who presents with chief complaint of worsening left shoulder pain and decreased range of motion since injuring her shoulder 2 weeks ago playing kickball. She reports that her orthopedist Dr. Sonia Joy recently told her that there was some type of small tear in the left shoulder when she had an appointment  a few months ago when she got an MRI of the left shoulder. She cannot recall exactly what type of tear. She states that she been taking NSAIDs and Flexeril with no relief. No numbness, weakness, dizziness, fever, and no other complaints. PCP: Hortencia Phillips MD        Past History     Past Medical History:  Past Medical History:   Diagnosis Date    Anemia     Diabetes ([address]     Pt. states pre-diabetic    Female bladder prolapse     PCOS (polycystic ovarian syndrome)     Thyroid disease     Pt. states they are watching her levels.     Unspecified urinary incontinence     UTI (urinary tract infection)        Past Surgical History:  Past Surgical History:   Procedure Laterality Date    COLONOSCOPY N/A 9/1/2017    COLONOSCOPY, DIAGNOSTIC /c Bx performed by Edgardo Bey MD at [facility] ENDOSCOPY    HX PELVIC LAPAROSCOPY      Pt with 2 previous abortions, and multiple surgeries to release adhesions       Family History:  Family History   Problem Relation Age of Onset    Hypertension Father     Heart Disease Father     Hypertension Paternal Grandfather     Hypertension Mother     Hypertension Brother     Cancer Neg Hx     Diabetes Neg Hx        Social History:  Social History     Tobacco Use    Smoking status: Never Smoker    Smokeless tobacco: Never Used   Substance Use Topics    Alcohol use: Not Currently    Drug use: No       Allergies:  No Known Allergies      Review of Systems       Review of Systems   Constitutional: Negative for chills and fever. HENT: Negative for sore throat and trouble swallowing. Eyes: Negative for visual disturbance. Respiratory: Negative for cough and shortness of breath. Cardiovascular: Negative for chest pain. Gastrointestinal: Negative for abdominal pain, nausea and vomiting. Musculoskeletal: Positive for arthralgias (Left shoulder pain and decreased range of motion). Negative for joint swelling, neck pain and neck stiffness. Skin: Negative for pallor and rash. Neurological: Negative for dizziness, numbness and headaches. Psychiatric/Behavioral: Negative for confusion and dysphoric mood. All other systems reviewed and are negative. Physical Exam     Visit Vitals  BP (!) 170/75 (BP 1 Location: Left upper arm, BP Patient Position: At rest)   Pulse 62   Temp 98.9 °F (37.2 °C)   Resp 16   Ht 5' 6\" (1.676 m)   Wt 95.3 kg (210 lb)   SpO2 99%   BMI 33.89 kg/m²       Physical Exam  Vitals signs and nursing note reviewed. Constitutional:       General: She is not in acute distress. Appearance: She is well-developed. She is not diaphoretic. HENT:      Head: Normocephalic and atraumatic. Eyes:      General: No scleral icterus. Conjunctiva/sclera: Conjunctivae normal.      Pupils: Pupils are equal, round, and reactive to light. Neck:      Musculoskeletal: Normal range of motion and neck supple. Cardiovascular:      Rate and Rhythm: Normal rate. Comments: Capillary refill < 3 seconds  Pulmonary:      Effort: Pulmonary effort is normal. No respiratory distress. Breath sounds: Normal breath sounds. No wheezing. Abdominal:      General: Bowel sounds are normal. There is no distension. Palpations: Abdomen is soft. Tenderness: There is no abdominal tenderness. Musculoskeletal: Normal range of motion. General: Tenderness present. Comments: Tenderness of the left upper trapezius muscle at the shoulder region    Tender to palpate anterior, superior, lateral and posterior left shoulder    No tenderness below the shoulder    Decreased range of motion in abduction and flexion due to pain limitation       Lymphadenopathy:      Cervical: No cervical adenopathy. Skin:     General: Skin is warm and dry. Neurological:      General: No focal deficit present. Mental Status: She is alert and oriented to person, place, and time. Cranial Nerves: No cranial nerve deficit. Sensory: No sensory deficit. Motor: No weakness. Coordination: Coordination normal.      Comments: Median, ulnar, radial nerves are intact  Strength 5 out of 5 bilateral upper extremities but is of pain limitation with left shoulder    Sensation intact           Diagnostic Study Results     Labs -  No results found for this or any previous visit (from the past 12 hour(s)). Radiologic Studies -   XR SHOULDER LT AP/LAT MIN 2 V    (Results Pending)   Per my preliminary read: No acute process, no fracture, no dislocation  Juan Be, DO      Medical Decision Making   I am the first provider for this patient. I reviewed the vital signs, available nursing notes, past medical history, past surgical history, family history and social history. Vital Signs-Reviewed the patient's vital signs. Records Reviewed: Nursing Notes and Old Medical Records (Time of Review: 8:18 AM)    Provider Notes (Medical Decision Making): DDx: Dislocation, fracture, rotator cuff tear, other internal derangement    We will get x-ray      MDM    Medications - No data to display        ED Course: Progress Notes, Reevaluation, and Consults:  I was able to review her MRI which was done in October 2020 which showed a subtle glenoid labral tear.     She may have worsening that tear or injured some other component of her shoulder. Pre and post sling: Neurovascular intact    She states she wants to go back to orthopedist Dr. Radha Delaney in [place]. I have reassessed the patient. I have discussed the workup, results and plan with the patient and patient is in agreement. Patient will be prescribed Norco.  Patient was discharge in stable condition. Patient was given outpatient follow up. Patient is to return to emergency department if any new or worsening condition. Diagnosis     Clinical Impression:   1. Left shoulder strain, initial encounter    2. Elevated blood pressure reading    3. Acute pain of left shoulder    4. Labral tear of shoulder, left, sequela        Disposition: Discharged    Follow-up Information     Follow up With Specialties Details Why Contact Info    Your orthopedic specialist  Schedule an appointment as soon as possible for a visit in 2 days      Amalia Watkins MD Family Medicine In 1 week Get repeat blood pressure check [address] [facility]  [phone]             Patient's Medications   Start Taking    HYDROCODONE-ACETAMINOPHEN (NORCO) 5-325 MG PER TABLET    Take 1 Tab by mouth every six (6) hours as needed for Pain for up to 3 days. Max Daily Amount: 4 Tabs. Continue Taking    BUDESONIDE (ENTOCORT EC) 3 MG CAPSULE    budesonide DR - ER 3 mg capsule,delayed,extended release   TAKE 1 CAPSULE BY MOUTH THREE TIMES DAILY    BUSPIRONE (BUSPAR) 5 MG TABLET    TAKE 1 TABLET BY MOUTH TWICE A DAY AS NEEDED    CHOLECALCIFEROL (VITAMIN D3) (5000 UNITS /125 MCG) CAPSULE    Take  by mouth.     CYCLOBENZAPRINE (FLEXERIL) 10 MG TABLET    TAKE 1 TABLET BY MOUTH TWICE A DAY AS NEEDED    DICYCLOMINE (BENTYL) 20 MG TABLET    dicyclomine 20 mg tablet   TAKE 1 (ONE) TABLET BY MOUTH EVERY SIX HOURS AS NEEDED    DOCUSATE SODIUM (COLACE) 100 MG CAPSULE    TAKE 1 CAPSULE BY MOUTH TWICE A DAY    ERGOCALCIFEROL (ERGOCALCIFEROL) 1,250 MCG (50,000 UNIT) CAPSULE    ergocalciferol (vitamin D2) 1,250 mcg (50,000 unit) capsule   TAKE ONE CAPSULE BY MOUTH EVERY WEEK    ETODOLAC (LODINE) 400 MG TABLET    TAKE 1 TABLET BY MOUTH TWICE A DAY    FERROUS SULFATE 325 MG (65 MG IRON) TABLET    ferrous sulfate 325 mg (65 mg iron) tablet   TAKE 1 TABLET BY MOUTH DAILY    IBUPROFEN (MOTRIN) 600 MG TABLET    Take 1 Tab by mouth every six (6) hours as needed for Pain. LEVOTHYROXINE (SYNTHROID) 50 MCG TABLET    Take  by mouth. MESALAMINE (PENTASA) 500 MG CR CAPSULE    Pentasa 500 mg capsule,controlled release   TAKE 2 CAPSULES BY MOUTH TWICE DAILY    METFORMIN ER (GLUCOPHAGE XR) 500 MG TABLET    TAKE 1 TABLET BY MOUTH EVERY DAY    NORETHINDRONE-E.ESTRADIOL-IRON (LO LOESTRIN FE) 1 MG-10 MCG (24)/10 MCG (2) TAB    Take  by mouth. PREDNISONE (STERAPRED) 5 MG DOSE PACK    1 (ONE) PACKAGE BY MOUTH USE AS DIRECTED PER INSTRUCTIONS IN PACK    PRENATAL VIT-IRON FUMARATE-FA (PRENATAL PLUS WITH IRON) 28 MG IRON- 800 MCG TAB    Take 1 Tab by mouth. These Medications have changed    No medications on file   Stop Taking    No medications on file         Betty Steen DO    Dragon medical dictation software was used for portions of this report. Unintended transcription errors may occur. My signature above authenticates this document and my orders, the final    diagnosis (es), discharge prescription (s), and instructions in the Epic    record.

## 2021-03-25 RX ORDER — NORETHINDRONE ACETATE AND ETHINYL ESTRADIOL AND FERROUS FUMARATE 1MG-20(21)
KIT ORAL
COMMUNITY
Start: 2021-02-23 | End: 2022-10-19

## 2021-03-25 NOTE — PATIENT INSTRUCTIONS
Carpal Tunnel Syndrome: Care Instructions  Overview     Carpal tunnel syndrome is numbness, tingling, weakness, and pain in your hand, wrist, and sometimes forearm. It is caused by pressure on the median nerve. This nerve and several tough tissues called tendons run through a space in the wrist. This space is called the carpal tunnel. The repeated hand motions used in work and some hobbies and sports can put pressure on the median nerve. Pregnancy can cause carpal tunnel syndrome. Several conditions, such as diabetes, arthritis, and an underactive thyroid, can also cause it. You may be able to limit an activity or change the way you do it to reduce your symptoms. You also can take other steps to feel better. If your symptoms are mild, 1 to 2 weeks of home treatment are likely to ease your pain. Surgery is needed only if other treatments do not work. Follow-up care is a key part of your treatment and safety. Be sure to make and go to all appointments, and call your doctor if you are having problems. It's also a good idea to know your test results and keep a list of the medicines you take. How can you care for yourself at home? · If possible, stop or reduce the activity that causes your symptoms. If you cannot stop the activity, take frequent breaks to rest and stretch or change hand positions to do a task. Try switching hands, such as when using a computer mouse. · Try to avoid bending or twisting your wrists. · Ask your doctor if you can take an over-the-counter pain medicine, such as acetaminophen (Tylenol), ibuprofen (Advil, Motrin), or naproxen (Aleve). Be safe with medicines. Read and follow all instructions on the label. · If your doctor prescribes corticosteroid medicine to help reduce pain and swelling, take it exactly as prescribed. Call your doctor if you think you are having a problem with your medicine. · Put ice or a cold pack on your wrist for 10 to 20 minutes at a time to ease pain.  Put a thin cloth between the ice and your skin. · If your doctor or your physical or occupational therapist tells you to wear a wrist splint, wear it as directed to keep your wrist in a neutral position. This also eases pressure on your median nerve. · Ask your doctor whether you should have physical or occupational therapy to learn how to do tasks differently. · Try a yoga class to stretch your muscles and build strength in your hands and wrists. Yoga has been shown to ease carpal tunnel symptoms. To prevent carpal tunnel  · When working at a computer, keep your hands and wrists in line with your forearms. Hold your elbows close to your sides. Take a break every 10 to 15 minutes. · Try these exercises:  ? Warm up: Rotate your wrist up, down, and from side to side. Repeat this 4 times. Stretch your fingers far apart, relax them, then stretch them again. Repeat 4 times. Stretch your thumb by pulling it back gently, holding it, and then releasing it. Repeat 4 times. ? Prayer stretch: Start with your palms together in front of your chest just below your chin. Slowly lower your hands toward your waistline while keeping your hands close to your stomach and your palms together until you feel a mild to moderate stretch under your forearms. Hold for 10 to 20 seconds. Repeat 4 times. ? Wrist flexor stretch: Hold your arm in front of you with your palm up. Bend your wrist, pointing your hand toward the floor. With your other hand, gently bend your wrist further until you feel a mild to moderate stretch in your forearm. Hold for 10 to 20 seconds. Repeat 4 times. ? Wrist extensor stretch: Repeat the steps for the wrist flexor stretch, but begin with your extended hand palm down. · Squeeze a rubber exercise ball several times a day to keep your hands and fingers strong. · Avoid holding objects (such as a book) in one position for a long time. When possible, use your whole hand to grasp an object.  Using just the thumb and index finger can put stress on the wrist.  · Do not smoke. It can make this condition worse by reducing blood flow to the median nerve. If you need help quitting, talk to your doctor about stop-smoking programs and medicines. These can increase your chances of quitting for good. When should you call for help? Watch closely for changes in your health, and be sure to contact your doctor if:    · Your pain or other problems do not get better with home care.     · You want more information about physical or occupational therapy.     · You have side effects of your corticosteroid medicine, such as:  ? Weight gain. ? Mood changes. ? Trouble sleeping. ? Bruising easily.     · You have any other problems with your medicine. Where can you learn more? Go to http://www.gray.com/  Enter R432 in the search box to learn more about \"Carpal Tunnel Syndrome: Care Instructions. \"  Current as of: March 2, 2020               Content Version: 12.6  © 9398-4569 Healthwise, Incorporated. Care instructions adapted under license by [employer] (which disclaims liability or warranty for this information). If you have questions about a medical condition or this instruction, always ask your healthcare professional. [place] [address] any warranty or liability for your use of this information.

## 2021-03-26 ENCOUNTER — OFFICE VISIT (OUTPATIENT)
Dept: ORTHOPEDIC SURGERY | Age: 41
End: 2021-03-26
Payer: MEDICAID

## 2021-03-26 DIAGNOSIS — M25.512 LEFT SHOULDER PAIN, UNSPECIFIED CHRONICITY: ICD-10-CM

## 2021-03-26 DIAGNOSIS — M75.52 BURSITIS OF LEFT SHOULDER: Primary | ICD-10-CM

## 2021-03-26 PROCEDURE — 99214 OFFICE O/P EST MOD 30 MIN: CPT | Performed by: ORTHOPAEDIC SURGERY

## 2021-03-26 PROCEDURE — 20611 DRAIN/INJ JOINT/BURSA W/US: CPT | Performed by: ORTHOPAEDIC SURGERY

## 2021-03-26 RX ORDER — TRIAMCINOLONE ACETONIDE 40 MG/ML
40 INJECTION, SUSPENSION INTRA-ARTICULAR; INTRAMUSCULAR ONCE
Status: COMPLETED | OUTPATIENT
Start: 2021-03-26 | End: 2021-03-26

## 2021-03-26 RX ORDER — LIDOCAINE HYDROCHLORIDE 10 MG/ML
9 INJECTION INFILTRATION; PERINEURAL ONCE
Status: COMPLETED | OUTPATIENT
Start: 2021-03-26 | End: 2021-03-26

## 2021-03-26 RX ADMIN — LIDOCAINE HYDROCHLORIDE 9 ML: 10 INJECTION INFILTRATION; PERINEURAL at 09:23

## 2021-03-26 RX ADMIN — TRIAMCINOLONE ACETONIDE 40 MG: 40 INJECTION, SUSPENSION INTRA-ARTICULAR; INTRAMUSCULAR at 09:24

## 2021-03-26 NOTE — LETTER
NOTIFICATION RETURN TO WORK / SCHOOL 
 
3/26/2021 9:31 AM 
 
Ms. Rebecca King [place] [address] To Whom It May Concern: 
 
Rebecca King is currently under the care of [address] [place] [address]. She will return to work/school on: Patient was seen today 03/26/2021. If there are questions or concerns please have the patient contact our office.  
 
 
 
Sincerely, 
 
 
Tigre Denise MD

## 2021-03-26 NOTE — LETTER
Gait: Norm  Limp  [address] [address]   Chair   [employer] MZT:87/47/3332    Today's Date:3/26/2021 PDF:[id number]                                                Shoulder/Elbow Est  2  3  4 New  2  3   Consult  3    Pre-op         Post-op     No LOS Injection Utrasound     Injection NO Ultrasound    Medication 19173 L      76579       Cortisone 63657 [place] Pal       62219 SHOULDER 
LT Pain  M25.512  RT Pain   M25.511 LT Frozen  M75.02  RT Frozen   M75.01   
LT Partial RTC M75.112  RT Partial  RTC  M75.111 LT RTC tear  M75.122  RT RTC tear   M75.121 LT Bursitis  M75.52  RT Bursitis   M75.51 LT OA   M19.012  RT OA   M19.011 LT recurrent dislocation M24.412  RT recurrent dislocation  M24.411 LT SLAP  S43.432A  RT SLAP  P12.505E SHOULDER FX 
Clavicle       67212  LT Clavicle    S42.025A RT Clavicle   S42.024A Humerus     31990   LT 2part   S42.225A RT 2part     S42.224A Supracondylar  48599 LT  S42.415A RT      S42.41        
 
                                                       ELBOW 
LT Pain                          M25.522  RT Pain    M25.521 LT Golfers elbow       M77.02  RT Golfers elbow   M77.01   
LT lateral epicondylitis    M77.12  RT lateral epicondylitis M77.11 LT olecranon bursitis      M70.22  RT olecranon bursitis  M70.21 LT ulnar nerve palsy      G56.22  RT ulnar nerve palsy  G56.21           
                                                       ELBOW FX Olecranon FX       [id number]  LT Olecranon  S52.035A RT Olecranon   S52.034A Radial head/neck  48603  LT Radial Head S52.125A RT Radial head  S52.124A LT Radial Neck  S52.135A RT Radial Neck S52.134A Splinting 13924- Long Arm Splint 88533- Short Arm Splint

## 2021-03-26 NOTE — LETTER
Mervin Denny 1980  
[id number]  
 
 
3/26/2021 I hereby authorize and direct Robbin Thornton MD, Wendie Quintana, and whomever he may designate as his associate to perform upon myself the following procedure: 
 
Injection of: Kenalog, Supartz, Euflexxa, Orthovisc in the Right/Left ____________________. If any unforeseen condition arises in the course of the procedure, I further authorize him and his associated and/or assistant(s) to do whatever he/she deems advisable. The nature, purpose, benefits, risks, side effects, likelihood of achieving goals, and potential problems that might occur during recuperation, risks for not receiving the proposed care, treatment and services and alternatives of the procedure have been fully explained to me by my physician including, but not limited to: 
 
Swelling, joint pain, skin pigment changes, worsening of condition, and failure to improve. I acknowledge that no guarantee or assurance has been made to me as to the results that may be obtained or the likelihood of success. _______________________________________ Signature of patient or authorized representative [employer] [facility] fax: [phone]

## 2021-03-26 NOTE — PROGRESS NOTES
Name: Monik Dodson    : 1980     Service Dept: [address] and Sports Medicine    Patient's Pharmacies:    [facility]/pharmacy #[id number]- [address] [facility]  ([address])  [address]  [address]  Phone: [phone] Fax: [phone]       Chief Complaint   Patient presents with    Shoulder Pain    Wrist Pain        There were no vitals taken for this visit. No Known Allergies   Current Outpatient Medications   Medication Sig Dispense Refill    Junel FE 1/20, 28, 1 mg-20 mcg (21)/75 mg (7) tab TAKE 1 TABLET BY MOUTH EVERY DAY      HYDROcodone-acetaminophen (Norco) 5-325 mg per tablet Take 1 Tab by mouth every six (6) hours as needed for Pain for up to 3 days. Max Daily Amount: 4 Tabs. 12 Tab 0    busPIRone (BUSPAR) 5 mg tablet TAKE 1 TABLET BY MOUTH TWICE A DAY AS NEEDED      cyclobenzaprine (FLEXERIL) 10 mg tablet TAKE 1 TABLET BY MOUTH TWICE A DAY AS NEEDED      metFORMIN ER (GLUCOPHAGE XR) 500 mg tablet TAKE 1 TABLET BY MOUTH EVERY DAY      predniSONE (STERAPRED) 5 mg dose pack 1 (ONE) PACKAGE BY MOUTH USE AS DIRECTED PER INSTRUCTIONS IN PACK      budesonide (ENTOCORT EC) 3 mg capsule budesonide DR - ER 3 mg capsule,delayed,extended release   TAKE 1 CAPSULE BY MOUTH THREE TIMES DAILY      cholecalciferol (VITAMIN D3) (5000 Units /125 mcg) capsule Take  by mouth.       dicyclomine (BENTYL) 20 mg tablet dicyclomine 20 mg tablet   TAKE 1 (ONE) TABLET BY MOUTH EVERY SIX HOURS AS NEEDED      docusate sodium (COLACE) 100 mg capsule TAKE 1 CAPSULE BY MOUTH TWICE A DAY      ergocalciferol (ERGOCALCIFEROL) 1,250 mcg (50,000 unit) capsule ergocalciferol (vitamin D2) 1,250 mcg (50,000 unit) capsule   TAKE ONE CAPSULE BY MOUTH EVERY WEEK      etodolac (LODINE) 400 mg tablet TAKE 1 TABLET BY MOUTH TWICE A DAY      ferrous sulfate 325 mg (65 mg iron) tablet ferrous sulfate 325 mg (65 mg iron) tablet   TAKE 1 TABLET BY MOUTH DAILY      levothyroxine (synthroid) 50 mcg tablet Take  by mouth.  mesalamine (Pentasa) 500 mg CR capsule Pentasa 500 mg capsule,controlled release   TAKE 2 CAPSULES BY MOUTH TWICE DAILY      prenatal vit-iron fumarate-fa (PRENATAL PLUS with IRON) 28 mg iron- 800 mcg tab Take 1 Tab by mouth.  ibuprofen (MOTRIN) 600 mg tablet Take 1 Tab by mouth every six (6) hours as needed for Pain. 30 Tab 1      Patient Active Problem List   Diagnosis Code    Cellulitis L03.90    Anemia D64.9    Chest pain R07.9    Delivery of pregnancy by  section O82      Family History   Problem Relation Age of Onset    Hypertension Father     Heart Disease Father     Hypertension Paternal Grandfather     Hypertension Mother     Hypertension Brother     Cancer Neg Hx     Diabetes Neg Hx       Social History     Socioeconomic History    Marital status:      Spouse name: Not on file    Number of children: Not on file    Years of education: Not on file    Highest education level: Not on file   Tobacco Use    Smoking status: Never Smoker    Smokeless tobacco: Never Used   Substance and Sexual Activity    Alcohol use: Not Currently    Drug use: No    Sexual activity: Yes     Partners: Male   Other Topics Concern      Past Surgical History:   Procedure Laterality Date    COLONOSCOPY N/A 2017    COLONOSCOPY, DIAGNOSTIC /c Bx performed by Abilio Gonzalez MD at [facility]      Pt with 2 previous abortions, and multiple surgeries to release adhesions      Past Medical History:   Diagnosis Date    Anemia     Diabetes ([address]     Pt. states pre-diabetic    Female bladder prolapse     PCOS (polycystic ovarian syndrome)     Thyroid disease     Pt. states they are watching her levels.     Unspecified urinary incontinence     UTI (urinary tract infection)         I have reviewed and agree with [address] and ROS and intake form in chart and the record furthermore I have reviewed prior medical record(s) regarding this patients care during this appointment. Review of Systems:   Patient is a pleasant appearing individual, appropriately dressed, well hydrated, well nourished, who is alert, appropriately oriented for age, and in no acute distress with a normal gait and normal affect who does not appear to be in any significant pain. Physical Exam:  Left Shoulder - Grossly neurovascularly intact. Range of motion-Full passive, Active with impingement. No Point tenderness, Strength-weakness with abduction, some mild crepitation, No skin lesion are identified, No instabilty is noted, No apprehension. No Swelling. Right Shoulder - Grossly neurovascularly intact, Full Range of motion, No point tenderness, No weakness, No skin lesions, No Instability, No apprehension, No swelling. Procedure Documentation:    I discussed in detail the risks, benefits and complications of an injection which included but are not limited to infection, skin reactions, hot swollen joint, and anaphylaxis with the patient. The patient verbalized understanding and gave informed consent for the injection. The patient's left shoulder were prepped using sterile alcohol solution. A sterile needle was inserted into the left shoulder and the mixture of 9 mL Lidocaine 1%, 1 mL Kenalog 40 mg was injected under sterile technique. The needle was withdrawn and the puncture site sealed with a Band-Aid. Technique: Under sterile conditions a [employer] ultrasound unit with a variable frequency (7.0-14.0 MHz) linear transducer was used to localize the placement of needle into the left joint. Findings: Successful needle placement for shoulder injection. Final images were taken and saved for permanent record. The patient tolerated the injection well. The patient was instructed to call the office immediately if there is any pain, redness, warmth, fever, or chills.    Encounter Diagnoses     ICD-10-CM ICD-9-CM   1. Bursitis of left shoulder M75.52 726.10   2. Left shoulder pain, unspecified chronicity  M25.512 719.41       HPI:  The patient is here with a chief complaint of left shoulder pain, sharp throbbing pain, progressively getting worse. Pain is 5/10. MRI was unremarkable for left shoulder as well as EMG for mild carpal tunnel. Assessment/Plan:  Plan at this point, ice, elevate, antiinflammatories, activities as tolerated, weightbearing started, no restrictions. We will see the patient back in about 4 weeks post physical therapy and post cortisone injection and go from there. As part of continued conservative pain management options the patient was advised to utilize Tylenol or OTC NSAIDS as long as it is not medically contraindicated. Return to Office: Follow-up and Dispositions    · Return in about 4 weeks (around 4/23/2021). Administrations This Visit     lidocaine (XYLOCAINE) 10 mg/mL (1 %) injection 9 mL     Admin Date  03/26/2021 Action  Given Dose  9 mL Route  Other Administered By  Anurag Ravi LPN          triamcinolone acetonide (KENALOG-40) 40 mg/mL injection 40 mg     Admin Date  03/26/2021 Action  Given Dose  40 mg Route  Intra artICUlar Administered By  Anurag Ravi LPN               Scribed by Daivd Merlin, LPN as dictated by [facility] [place] NICK Sanders MD.  Documentation True and Accepted Robbin Sanders MD

## 2021-04-01 ENCOUNTER — HOSPITAL ENCOUNTER (OUTPATIENT)
Dept: PHYSICAL THERAPY | Age: 41
Discharge: HOME | End: 2021-04-01
Payer: MEDICAID

## 2021-04-01 PROCEDURE — 97162 PT EVAL MOD COMPLEX 30 MIN: CPT
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
## 2021-05-04 ENCOUNTER — HOSPITAL ENCOUNTER (OUTPATIENT)
Dept: PHYSICAL THERAPY | Age: 41
Discharge: HOME | End: 2021-05-04
Payer: MEDICAID

## 2021-05-04 PROCEDURE — 97110 THERAPEUTIC EXERCISES: CPT

## 2021-05-04 PROCEDURE — 97530 THERAPEUTIC ACTIVITIES: CPT

## 2021-05-04 PROCEDURE — 97112 NEUROMUSCULAR REEDUCATION: CPT

## 2021-05-04 NOTE — PROGRESS NOTES
[facility]  [address] [facility]  [phone] fax    Physical Therapy Progress Note  Patient name: Romario Gambino Start of Care: 21   Referral source: Sabrina Dooley MD : 1980   Medical/Treatment Diagnosis: Left shoulder pain [M25.512]  Payor: BLUE CROSS MEDICAID / Plan: [address] [place] [address] / Product Type: Managed Care Medicaid /  Onset Date:2021     Prior Hospitalization: see medical history Provider#: [id number]   Medications: Verified on Patient Summary List     Comorbidities: BMI over 30   Prior Level of Function: Patient is right hand dominant. Prior to most recent exacerbation of left shoulder pain patient was able to reach overhead without pain or restriction.       Visits from Start of Care: 2    Missed Visits: 0    Established Goals:         Excellent           Good         Limited           None  [x] Increased ROM   [x]  []  []  []  [x] Increased Strength  [x]  []  []  []  [] Increased Mobility  []  []  []  []   [x] Decreased Pain   [x]  []  []  []  [] Decreased Swelling  []  []  []  []    Key Functional Changes: improving shoulder/cervical AROM, reducing pain levels, improving FOTO score, subjective reports of 80% improvement    Updated Goals: to be achieved in 4 weeks:  1. Pt will improve FOTO by 14 points in order to demonstrate functional improvement. 2.  Pt will improve left shoulder strength to 4+/5 in order to improve ease of lifting with ADLs and household management. 3.  Pt will improve left shoulder flexion/abduction AROM to 150* in order to improve ability with reaching overhead with ADLs. ASSESSMENT/RECOMMENDATIONS:  Pt is making steady progress in therapy despite lack of attendance. She had > 1 month absence from therapy while awaiting insurance authorization but reports compliance with HEP during her absence.   Through HEP compliance, pt has met 4/5 initial goals and is progressing towards FOTO goal.  She demonstrates significant improvement in cervical and shoulder AROM and reports 80% improvement since start of care. She has not had any new shoulder subluxations since initial evaluation. Shoulder strength deficits continue to be evident, and pt complains of \"heavy\" feeling of left UE, consistent with labral involvement. She demonstrates UT substitution when fatigued, which is likely contributing to continued pain over UT. Pt will benefit from continued skilled PT to address remaining strength, ROM, flexibility, and postural deficits in order to increase functional use of left UE and allow for return to PLOF. [x]Continue therapy per initial plan/protocol at a frequency of  2-3 x per week for 4 weeks  []Continue therapy with the following recommended changes:_____________________      _____________________________________________________________________  []Discontinue therapy progressing towards or have reached established goals  []Discontinue therapy due to lack of appreciable progress towards goals  []Discontinue therapy due to lack of attendance or compliance  []Await Physician's recommendations/decisions regarding therapy  []Other:________________________________________________________________    Thank you for this referral.   Elsa Jimenez, PT 5/4/2021 5:29 PM    NOTE TO PHYSICIAN:  [address] AND   FAX TO [facility]: [phone]  If you are unable to process this request in 24 hours please contact our office: [phone] I have read the above report and request that my patient continue as recommended. ? I have read the above report and request that my patient continue therapy with the following changes/special instructions:____________________________________  ? I have read the above report and request that my patient be discharged from therapy.     Physicians signature: ______________________________Date: ______Time:______     Alexis Petty MD

## 2021-05-04 NOTE — PROGRESS NOTES
PT DAILY TREATMENT NOTE     Patient Name: Hemalatha Yang  Date:2021  : 1980  [x]  Patient  Verified  Payor: BLUE CROSS MEDICAID / Plan: [facility] [employer] / Product Type: Managed Care Medicaid /    In time:10:30  Out time:11:24  Total Treatment Time (min): 54  Visit #: 2 of 8    Medicare/BCBS Only   Total Timed Codes (min):  44 1:1 Treatment Time:  40       Treatment Area: Left shoulder pain [M25.512]    SUBJECTIVE  Pain Level (0-10 scale): 0/10   Any medication changes, allergies to medications, adverse drug reactions, diagnosis change, or new procedure performed?: [x] No    [] Yes (see summary sheet for update)  Subjective functional status/changes:   [] No changes reported  Pt reports that she has been doing her HEP at home since the eval, and that has really helped. She reports 80% overall improvement for the past 3 weeks. She reports that shoulder pain fluctuates, so she knows the pain may return in the future. She does not have any problems gripping. OBJECTIVE    15 min Therapeutic Exercise:  [x] See flow sheet :   Rationale: increase ROM and increase strength to improve the patients ability to improve ease of reaching and lifting with daily tasks     16 min Therapeutic Activity:  []  See flow sheet : goal assessment, patient education    Rationale:  To determine therapy plan and optimize pt understanding      13 min Neuromuscular Re-education:  [x]  See flow sheet : posture re-ed with Divya rows, scapular retraction and SB depression    Rationale: increase strength, improve balance and increase proprioception  to improve the patients ability to improve ease of reaching with daily tasks             With   [] TE   [] TA   [] neuro   [] other: Patient Education: [x] Review HEP    [] Progressed/Changed HEP based on:   [] positioning   [] body mechanics   [] transfers   [] heat/ice application    [] other:      Other Objective/Functional Measures:     Cervical Rotation AROM:  Left: 61*  Right: 80*     Shoulder AROM:  Flexion: Right 162*, Left 147*  Abduction: Right 164*, Left 128*   Functional ER: Right T5, Left T4  Functional IR: Right T7, Left T9    MMT B Shoulders  Flexion: Right 4+/5, Left 4-/5  Abduction: Right 4+/5, Left 4-/5  IR: Right 4+/5, Left 4/5  ER: Right 4+/5, Left 4/5     FOTO: 59    Pt reported slightly increased pain following session. Pain was decreasing with UT stretch and heat     Pain Level (0-10 scale) post treatment: 2-3/10     ASSESSMENT/Changes in Function: See Progress Note     Patient will continue to benefit from skilled PT services to modify and progress therapeutic interventions, address ROM deficits, address strength deficits, analyze and address soft tissue restrictions, analyze and cue movement patterns, analyze and modify body mechanics/ergonomics, assess and modify postural abnormalities and instruct in home and community integration to attain remaining goals. []  See Plan of Care  [x]  See progress note/recertification  []  See Discharge Summary         Progress towards goals / Updated goals:  Short Term Goals: To be accomplished in 2 weeks:               1. Patient will be independent and compliant with HEP 1-2x/day to increase ease with ADls. Eval: HEP established. Current: Goal met. 5/4/21                2. Patient will improve Cervical AROM Rotation bilaterally to 50deg to increase ease with driving. Eval: Cervical AROM rotation: Left: 35deg , right: 45deg    Current: Goal met. Long Term Goals: To be accomplished in 4 weeks:               1. Patient will improve FOTO to at least 67 to demonstrate improve function. Eval: FOTO: 53   Current: Progressing. 59 (improved 6 points)               2. Patient will improve left shoulder AROM flexion and scaption to 110deg to increase ease with overhead activities. Eval: Left shoulder AROM: flex: 85deg Scap: 70deg    Current. Goal met. See above 5/4/21               3. Patient will report average pain level no more then 2/10 to increase ease with work duties. Eval: Patient reports average left shoulder pain level is 6/10. Goal met. Max pain 2/10.   Average pain 0/10 within the past week 5/4/21     PLAN  [x]  Upgrade activities as tolerated     [x]  Continue plan of care  []  Update interventions per flow sheet       []  Discharge due to:_  []  Other:_      Jordy Bradford, PT 5/4/2021  10:38 AM    Future Appointments   Date Time Provider Diogo Katz   5/5/2021  9:45 AM Amber SALEHZYTB [facility]   5/11/2021 10:30 AM Bri Tabor, PT [id number] [facility]   5/12/2021 10:30 AM Bri Tabor, PT [id number] [facility]   5/18/2021 10:30 AM Jose Jin, PTA MMCPTPB [facility]   5/19/2021 10:30 AM Jose Jin, PTA [facility]   5/25/2021 10:30 AM Bri Tabor, PT [facility]CPTP[facility]

## 2021-05-05 ENCOUNTER — APPOINTMENT (OUTPATIENT)
Dept: PHYSICAL THERAPY | Age: 41
End: 2021-05-05
Payer: MEDICAID

## 2021-05-11 ENCOUNTER — HOSPITAL ENCOUNTER (OUTPATIENT)
Dept: PHYSICAL THERAPY | Age: 41
Discharge: HOME | End: 2021-05-11
Payer: MEDICAID

## 2021-05-11 PROCEDURE — 97530 THERAPEUTIC ACTIVITIES: CPT

## 2021-05-11 PROCEDURE — 97110 THERAPEUTIC EXERCISES: CPT

## 2021-05-11 PROCEDURE — 97112 NEUROMUSCULAR REEDUCATION: CPT

## 2021-05-11 NOTE — PROGRESS NOTES
PT DAILY TREATMENT NOTE 11    Patient Name: Sarita Vazquez  Date:2021  : 1980  [x]  Patient  Verified  Payor: BLUE CROSS MEDICAID / Plan: [facility] [employer] PLUS / Product Type: Managed Care Medicaid /    In time:10:37  Out time:11:18  Total Treatment Time (min): 51  Visit #: 1 of 12    Medicare/BCBS Only   Total Timed Codes (min): 41 1:1 Treatment Time: 41       Treatment Area: Left shoulder pain [M25.512]    SUBJECTIVE  Pain Level (0-10 scale): 0/10  Any medication changes, allergies to medications, adverse drug reactions, diagnosis change, or new procedure performed?: [x] No    [] Yes (see summary sheet for update)  Subjective functional status/changes:   [] No changes reported  Pt reports that her shoulder is feeling good today. She was a little sore for 1-2 days after last therapy session, but the soreness wore off.       OBJECTIVE    Modality rationale: decrease pain and increase tissue extensibility to improve the patients ability to tolerate daily tasks   Min Type Additional Details    [] Estim:  []Unatt       []IFC  []Premod                        []Other:  []w/ice   []w/heat  Position:  Location:    [] Estim: []Att    []TENS instruct  []NMES                    []Other:  []w/US   []w/ice   []w/heat  Position:  Location:    []  Traction: [] Cervical       []Lumbar                       [] Prone          []Supine                       []Intermittent   []Continuous Lbs:  [] before manual  [] after manual    []  Ultrasound: []Continuous   [] Pulsed                           []1MHz   []3MHz W/cm2:  Location:    []  Iontophoresis with dexamethasone         Location: [] Take home patch   [] In clinic   10 []  Ice     [x]  heat  []  Ice massage  []  Laser   []  Anodyne Position: seated  Location: left shoulder     []  Laser with stim  []  Other:  Position:  Location:    []  Vasopneumatic Device Pressure:       [] lo [] med [] hi   Temperature: [] lo [] med [] hi   [x] Skin assessment post-treatment:  [x]intact []redness- no adverse reaction    []redness  adverse reaction:      23 min Therapeutic Exercise:  [x] See flow sheet :   Rationale: increase ROM and increase strength to improve the patients ability to improve ease of reaching/lifting with ADLs and household management    10 min Therapeutic Activity:  [x]  See flow sheet : reaching interventions with wall slide and supine wand   Rationale: increase ROM and increase strength  to improve the patients ability to improve ability with reaching with daily tasks      8 min Neuromuscular Re-education:  [x]  See flow sheet : posture re-ed with wall push up plus and Divya rows     Rationale: increase strength and increase proprioception  to improve the patients ability to improve ability with reaching with daily tasks             With   [] TE   [] TA   [] neuro   [] other: Patient Education: [x] Review HEP    [] Progressed/Changed HEP based on:   [] positioning   [] body mechanics   [] transfers   [] heat/ice application    [] other:      Other Objective/Functional Measures:     Appointment started 7 minutes late d/t pt using restroom prior to appointment     Challenged with wall slides, discomfort with performing but discomfort always subsided immediately upon ceasing  Pain with wall push up plus, ceased after 5 reps d/t pain     Gave and reviewed submax shoulder iso x 6 for HEP, stressed importance of completing in pain-free range   No pain with Divya interventions     Challenged with eccentric control with lowering during sidelying abduction     UT substitution with supine wand with HOB elevated as shoulder fatigued, ceased after 6 reps d/t compensation    Pt reported mild muscle soreness following session, soreness was slightly reduced with heat    Pain Level (0-10 scale) post treatment: 2/10    ASSESSMENT/Changes in Function:     Pt is making slow, steady progress towards updated goals in therapy.   She was challenged with upgraded interventions this visit but remains highly motivated. Gave and reviewed shoulder iso x 6 and instructed pt to perform every other day to optimize strength progression. Will continue to address strength, ROM, and flexibility deficits in order to increase functional use of left UE. Patient will continue to benefit from skilled PT services to modify and progress therapeutic interventions, address ROM deficits, address strength deficits, analyze and address soft tissue restrictions, analyze and cue movement patterns, assess and modify postural abnormalities and instruct in home and community integration to attain remaining goals. Progress towards goals / Updated goals:  1. Pt will improve FOTO by 14 points in order to demonstrate functional improvement. 2.  Pt will improve left shoulder strength to 4+/5 in order to improve ease of lifting with ADLs and household management. 3.  Pt will improve left shoulder flexion/abduction AROM to 150* in order to improve ability with reaching overhead with ADLs.       PLAN  [x]  Upgrade activities as tolerated     [x]  Continue plan of care  []  Update interventions per flow sheet       []  Discharge due to:_  []  Other:_      Bianka Espinoza PT 5/11/2021  10:41 AM    Future Appointments   Date Time Provider Diogo Katz   5/12/2021 10:30 AM Fannie Roy PT [facility]CP[facility]   5/18/2021 10:30 AM Sofiya Reyes PTA [facility]   5/19/2021 10:30 AM Sofiya Reyes PTA [facility]   5/25/2021 10:30 AM Fannie Roy PT [facility]

## 2021-05-12 ENCOUNTER — APPOINTMENT (OUTPATIENT)
Dept: PHYSICAL THERAPY | Age: 41
End: 2021-05-12
Payer: MEDICAID

## 2021-05-18 ENCOUNTER — HOSPITAL ENCOUNTER (OUTPATIENT)
Dept: PHYSICAL THERAPY | Age: 41
Discharge: HOME | End: 2021-05-18
Payer: MEDICAID

## 2021-05-18 PROCEDURE — 97110 THERAPEUTIC EXERCISES: CPT

## 2021-05-18 PROCEDURE — 97112 NEUROMUSCULAR REEDUCATION: CPT

## 2021-05-18 PROCEDURE — 97530 THERAPEUTIC ACTIVITIES: CPT

## 2021-05-18 NOTE — PROGRESS NOTES
PT DAILY TREATMENT NOTE     Patient Name: Cole Feliz  Date:2021  : 1980  [x]  Patient  Verified  Payor: BLUE CROSS MEDICAID / Plan: [place] / Product Type: Managed Care Medicaid /    In time: 10:30  Out time: 11:21  Total Treatment Time (min): 51  Visit #: 2 of 12    Medicare/BCBS Only   Total Timed Codes (min): 41 1:1 Treatment Time:41       Treatment Area: Left shoulder pain [M25.512]    SUBJECTIVE  Pain Level (0-10 scale): 1/10  Any medication changes, allergies to medications, adverse drug reactions, diagnosis change, or new procedure performed?: [x] No    [] Yes (see summary sheet for update)  Subjective functional status/changes:   [] No changes reported  Pt reports overall less pain. She doesn't have bands at home. She wants to be able to return to working out. She has most pain/difficulty reaching out to the side and behind her head when laying down. She doesn't have to make a follow up with the MD until she is done with therapy.      OBJECTIVE    Modality rationale: decrease pain and increase tissue extensibility to improve the patients ability to tolerate daily tasks   Min Type Additional Details    [] Estim:  []Unatt       []IFC  []Premod                        []Other:  []w/ice   []w/heat  Position:  Location:    [] Estim: []Att    []TENS instruct  []NMES                    []Other:  []w/US   []w/ice   []w/heat  Position:  Location:    []  Traction: [] Cervical       []Lumbar                       [] Prone          []Supine                       []Intermittent   []Continuous Lbs:  [] before manual  [] after manual    []  Ultrasound: []Continuous   [] Pulsed                           []1MHz   []3MHz W/cm2:  Location:    []  Iontophoresis with dexamethasone         Location: [] Take home patch   [] In clinic   10 []  Ice     [x]  heat  []  Ice massage  []  Laser   []  Anodyne Position: seated  Location: left shoulder     []  Laser with stim  [] Other:  Position:  Location:    []  Vasopneumatic Device Pressure:       [] lo [] med [] hi   Temperature: [] lo [] med [] hi   [x] Skin assessment post-treatment:  [x]intact []redness- no adverse reaction    []redness  adverse reaction:      16 min Therapeutic Exercise:  [x] See flow sheet :   Rationale: increase ROM and increase strength to improve the patients ability to improve ease of reaching/lifting with ADLs and household management    15 min Therapeutic Activity:  [x]  See flow sheet : OH reaching activities; education on posture importance; updating HEP   Rationale: increase ROM and increase strength  to improve the patients ability to improve ability with reaching with daily tasks      10 min Neuromuscular Re-education:  [x]  See flow sheet : rhythmic stabs; prone scap setting with exercises for postural re-ed   Rationale: increase strength and increase proprioception  to improve the patients ability to improve ability with reaching with daily tasks             With   [] TE   [] TA   [] neuro   [] other: Patient Education: [x] Review HEP    [] Progressed/Changed HEP based on:   [] positioning   [] body mechanics   [] transfers   [] heat/ice application    [] other:      Other Objective/Functional Measures:   Slight pain with standing shoulder abduction; decreased with scap retraction and posture correction  Challenged with ER strengthening  Updated HEP and provided OTB and BTB for program  Fatigued with prone scapula stabilizing  Heaviness of arm by end of session but no increased pain    Pain Level (0-10 scale) post treatment: 0/10    ASSESSMENT/Changes in Function: Pt progressing in therapy with improving shoulder mobility and strength with decreased pain. She continues with forward shoulder posture likely previous guarding from pain and compensations. She continues to utilize UT hiking with fatigue and has decreased postural endurance and strength.  Will continue to progress shoulder stability for decreased pain and improved AROM for ease of working out and performing ADLs. Patient will continue to benefit from skilled PT services to modify and progress therapeutic interventions, address ROM deficits, address strength deficits, analyze and address soft tissue restrictions, analyze and cue movement patterns, assess and modify postural abnormalities and instruct in home and community integration to attain remaining goals. Progress towards goals / Updated goals:  1. Pt will improve FOTO by 14 points in order to demonstrate functional improvement. 2.  Pt will improve left shoulder strength to 4+/5 in order to improve ease of lifting with ADLs and household management. 3.  Pt will improve left shoulder flexion/abduction AROM to 150* in order to improve ability with reaching overhead with ADLs.       PLAN  [x]  Upgrade activities as tolerated     [x]  Continue plan of care  []  Update interventions per flow sheet       []  Discharge due to:_  []  Other:_      Matilda Lima PTA 5/18/2021  10:41 AM    Future Appointments   Date Time Provider Diogo Katz   5/18/2021 10:30 AM Padmini Billy PTA [facility]   5/19/2021 10:30 AM Padmini Billy PTA [facility]   5/25/2021 10:30 AM Albaro Funez PT [facility]

## 2021-05-19 ENCOUNTER — HOSPITAL ENCOUNTER (OUTPATIENT)
Dept: PHYSICAL THERAPY | Age: 41
Discharge: HOME | End: 2021-05-19
Payer: MEDICAID

## 2021-05-19 PROCEDURE — 97530 THERAPEUTIC ACTIVITIES: CPT

## 2021-05-19 PROCEDURE — 97110 THERAPEUTIC EXERCISES: CPT

## 2021-05-19 NOTE — PROGRESS NOTES
PT DAILY TREATMENT NOTE     Patient Name: Lety Guidry  Date:2021  : 1980  [x]  Patient  Verified  Payor: BLUE CROSS MEDICAID / Plan: [facility] / Product Type: Managed Care Medicaid /    In time: 10:35  Out time: 11:15  Total Treatment Time (min): 40  Visit #: 3 of 12    Medicare/BCBS Only   Total Timed Codes (min): 40 1:1 Treatment Time:40       Treatment Area: Left shoulder pain [M25.512]    SUBJECTIVE  Pain Level (0-10 scale): 3/10  Any medication changes, allergies to medications, adverse drug reactions, diagnosis change, or new procedure performed?: [x] No    [] Yes (see summary sheet for update)  Subjective functional status/changes:   [] No changes reported  Pt reports feeling pain from yesterday and had to take a motrin. She thinks she may have gotten the pain from the exercise laying on her side lifting her arm (Sidelying ER). 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      OBJECTIVE    Modality rationale: decrease pain and increase tissue extensibility to improve the patients ability to tolerate daily tasks   Min Type Additional Details    [] Estim:  []Unatt       []IFC  []Premod                        []Other:  []w/ice   []w/heat  Position:  Location:    [] Estim: []Att    []TENS instruct  []NMES                    []Other:  []w/US   []w/ice   []w/heat  Position:  Location:    []  Traction: [] Cervical       []Lumbar                       [] Prone          []Supine                       []Intermittent   []Continuous Lbs:  [] before manual  [] after manual    []  Ultrasound: []Continuous   [] Pulsed                           []1MHz   []3MHz W/cm2:  Location:    []  Iontophoresis with dexamethasone         Location: [] Take home patch   [] In clinic   During patient education []  Ice     []  heat  [x]  Ice massage  []  Laser   []  Anodyne Position: supine  Location: left shoulder anterior shoulder subacromial region    []  Laser with stim  []  Other:  Position:  Location:    []  Vasopneumatic Device Pressure:       [] lo [] med [] hi   Temperature: [] lo [] med [] hi   [x] Skin assessment post-treatment:  [x]intact []redness- no adverse reaction    []redness  adverse reaction:      15 min Therapeutic Exercise:  [x] See flow sheet :   Rationale: increase ROM and increase strength to improve the patients ability to improve ease of reaching/lifting with ADLs and household management    25 min Therapeutic Activity:  [x]  See flow sheet : education on posture and ergonomics for computer; educated on theracane for TPR to the UTs while performing gentle TPR and STM to left UT; educated on ice massage to reduce inflammation and use of ice for anterior shoulder and heat for UT   Rationale: increase ROM and increase strength  to improve the patients ability to improve ability with reaching with daily tasks            With   [] TE   [] TA   [] neuro   [] other: Patient Education: [x] Review HEP    [] Progressed/Changed HEP based on:   [] positioning   [] body mechanics   [] transfers   [] heat/ice application    [] other:      Other Objective/Functional Measures:   Reduction of pain and improvement in mobility post ice massage and TPR to left upper trap  Education provided as seen above  Provided pt with educational handout of computer setup/ergonomics for proper posture  Discussed hold of ER if it increases pain and perform isometrically until more stable  Forward shoulder posture continues    Pain Level (0-10 scale) post treatment: 0/10    ASSESSMENT/Changes in Function: Pt with likely inflammatory flare up due to forward posture and repetition of movements with exercises. 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 She has decreased rotator cuff strength and scapula stabilizers.  Will continue to progress left shoulder stability and mobility for ease of working and OH reaching for dressing and ADLs. Patient will continue to benefit from skilled PT services to modify and progress therapeutic interventions, address ROM deficits, address strength deficits, analyze and address soft tissue restrictions, analyze and cue movement patterns, assess and modify postural abnormalities and instruct in home and community integration to attain remaining goals. Progress towards goals / Updated goals:  1. Pt will improve FOTO by 14 points in order to demonstrate functional improvement. 2.  Pt will improve left shoulder strength to 4+/5 in order to improve ease of lifting with ADLs and household management. 3.  Pt will improve left shoulder flexion/abduction AROM to 150* in order to improve ability with reaching overhead with ADLs.       PLAN  [x]  Upgrade activities as tolerated     [x]  Continue plan of care  []  Update interventions per flow sheet       []  Discharge due to:_  []  Other:_      Moira Christine PTA 5/19/2021  10:41 AM    Future Appointments   Date Time Provider Diogo Katz   5/19/2021 10:30 AM Yves Brooks PTA [facility]   5/25/2021 10:30 AM Prasanna Kirk PT [facility]

## 2021-05-25 ENCOUNTER — HOSPITAL ENCOUNTER (OUTPATIENT)
Dept: PHYSICAL THERAPY | Age: 41
Discharge: HOME | End: 2021-05-25
Payer: MEDICAID

## 2021-05-25 PROCEDURE — 97530 THERAPEUTIC ACTIVITIES: CPT

## 2021-05-25 PROCEDURE — 97112 NEUROMUSCULAR REEDUCATION: CPT

## 2021-05-25 PROCEDURE — 97110 THERAPEUTIC EXERCISES: CPT

## 2021-05-25 NOTE — PROGRESS NOTES
PT DAILY TREATMENT NOTE     Patient Name: Hemalatha Yang  Date:2021  : 1980  [x]  Patient  Verified  Payor: BLUE CROSS MEDICAID / Plan: [facility] [employer] / Product Type: Managed Care Medicaid /    In time: 11:18  Out time:11:58  Total Treatment Time (min): 40  Visit #: 4 of 12    Treatment Area: Left shoulder pain [M25.512]    SUBJECTIVE  Pain Level (0-10 scale): 0/10  Any medication changes, allergies to medications, adverse drug reactions, diagnosis change, or new procedure performed?: [x] No    [] Yes (see summary sheet for update)  Subjective functional status/changes:   [] No changes reported  Pt reports doing ok. She feels good with ice massage and has been trying that at home.      OBJECTIVE    Modality rationale: decrease edema, decrease inflammation and decrease pain to improve the patients ability to tolerate ADLs/amb   Min Type Additional Details    [] Estim:  []Unatt       []IFC  []Premod                        []Other:  []w/ice   []w/heat  Position:  Location:    [] Estim: []Att    []TENS instruct  []NMES                    []Other:  []w/US   []w/ice   []w/heat  Position:  Location:    []  Traction: [] Cervical       []Lumbar                       [] Prone          []Supine                       []Intermittent   []Continuous Lbs:  [] before manual  [] after manual    []  Ultrasound: []Continuous   [] Pulsed                           []1MHz   []3MHz W/cm2:  Location:    []  Iontophoresis with dexamethasone         Location: [] Take home patch   [] In clinic   10 [x]  Ice     []  heat  []  Ice massage  []  Laser   []  Anodyne Position: supine  Location: Left shoulder    []  Laser with stim  []  Other:  Position:  Location:    []  Vasopneumatic Device Pressure:       [] lo [] med [] hi   Temperature: [] lo [] med [] hi   [] Skin assessment post-treatment:  []intact []redness- no adverse reaction    []redness - adverse reaction:       12 min Therapeutic Exercise:  [x] See flow sheet :   Rationale: increase ROM and increase strength to improve the patients ability to perform ADLs with more ease    8 min Therapeutic Activity:  [x]  See flow sheet :   Rationale: increase ROM, increase strength, improve coordination, improve balance and increase proprioception  to improve the patients ability to perform ADLs/chores with more ease     10 min Neuromuscular Re-education:  [x]  See flow sheet :   Rationale: improve coordination and increase proprioception  to improve the patients ability to perform ADLs with ease and safety          With   [] TE   [] TA   [] neuro   [] other: Patient Education: [x] Review HEP    [] Progressed/Changed HEP based on:   [] positioning   [] body mechanics   [] transfers   [] heat/ice application    [] other:      Other Objective/Functional Measures:    Min soreness with Biceps curl, chest fly, ER iso   Poor stretch overall   Compensated of UT with therex, able to self correct with verbal cues      Pain Level (0-10 scale) post treatment: 0/10    ASSESSMENT/Changes in Function: Pt cont to demonstrates mod guarding with fair posture. She demonstrates improving tolerance with progressive strengthening therex. Will cont to progress therex as tolerated. Patient will continue to benefit from skilled PT services to modify and progress therapeutic interventions, address functional mobility deficits, address ROM deficits, address strength deficits, analyze and address soft tissue restrictions, analyze and cue movement patterns, analyze and modify body mechanics/ergonomics, assess and modify postural abnormalities and instruct in home and community integration to attain remaining goals. []  See Plan of Care  [x]  See progress note/recertification  []  See Discharge Summary         Progress towards goals / Updated goals:  1.  Pt will improve FOTO by 14 points in order to demonstrate functional improvement.   2.  Pt will improve left shoulder strength to 4+/5 in order to improve ease of lifting with ADLs and household management. 3.  Pt will improve left shoulder flexion/abduction AROM to 150* in order to improve ability with reaching overhead with ADLs.       PLAN  [x]  Upgrade activities as tolerated     [x]  Continue plan of care  []  Update interventions per flow sheet       []  Discharge due to:_  []  Other:_      Kate Fritz, PT 5/25/2021  10:46 AM    Future Appointments   Date Time Provider Diogo Katz   5/25/2021 11:15 AM Jose Estrada [facility]

## 2021-06-30 ENCOUNTER — HOSPITAL ENCOUNTER (OUTPATIENT)
Dept: PHYSICAL THERAPY | Age: 41
End: 2021-06-30

## 2021-07-06 ENCOUNTER — APPOINTMENT (OUTPATIENT)
Dept: PHYSICAL THERAPY | Age: 41
End: 2021-07-06

## 2021-07-26 DIAGNOSIS — M79.642 LEFT HAND PAIN: Primary | ICD-10-CM

## 2021-07-30 ENCOUNTER — OFFICE VISIT (OUTPATIENT)
Dept: ORTHOPEDIC SURGERY | Age: 41
End: 2021-07-30
Payer: MEDICAID

## 2021-07-30 VITALS — HEIGHT: 66 IN | WEIGHT: 204 LBS | BODY MASS INDEX: 32.78 KG/M2

## 2021-07-30 DIAGNOSIS — M18.12 PRIMARY OSTEOARTHRITIS OF FIRST CARPOMETACARPAL JOINT OF LEFT HAND: Primary | ICD-10-CM

## 2021-07-30 DIAGNOSIS — M25.532 LEFT WRIST PAIN: ICD-10-CM

## 2021-07-30 PROCEDURE — 20600 DRAIN/INJ JOINT/BURSA W/O US: CPT | Performed by: ORTHOPAEDIC SURGERY

## 2021-07-30 PROCEDURE — 99214 OFFICE O/P EST MOD 30 MIN: CPT | Performed by: ORTHOPAEDIC SURGERY

## 2021-07-30 RX ORDER — TRIAMCINOLONE ACETONIDE 40 MG/ML
40 INJECTION, SUSPENSION INTRA-ARTICULAR; INTRAMUSCULAR ONCE
Status: COMPLETED | OUTPATIENT
Start: 2021-07-30 | End: 2021-07-30

## 2021-07-30 RX ORDER — LIDOCAINE HYDROCHLORIDE 10 MG/ML
1 INJECTION INFILTRATION; PERINEURAL ONCE
Status: COMPLETED | OUTPATIENT
Start: 2021-07-30 | End: 2021-07-30

## 2021-07-30 RX ADMIN — LIDOCAINE HYDROCHLORIDE 1 ML: 10 INJECTION INFILTRATION; PERINEURAL at 11:26

## 2021-07-30 RX ADMIN — TRIAMCINOLONE ACETONIDE 40 MG: 40 INJECTION, SUSPENSION INTRA-ARTICULAR; INTRAMUSCULAR at 11:27

## 2021-07-30 NOTE — PATIENT INSTRUCTIONS

## 2021-07-30 NOTE — PROGRESS NOTES
Name: Faith Acosta    : 1980     Service Dept: [address] and Sports Medicine    Patient's Pharmacies:    [facility]/pharmacy #[id number]- [address] [address]  ([address])  [address]  [address]  Phone: [phone] Fax: [phone]       Chief Complaint   Patient presents with    Hand Pain    Wrist Pain        Visit Vitals  Ht 5' 6\" (1.676 m)   Wt 204 lb (92.5 kg)   BMI 32.93 kg/m²      No Known Allergies   Current Outpatient Medications   Medication Sig Dispense Refill    Junel FE 1/20, 28, 1 mg-20 mcg (21)/75 mg (7) tab TAKE 1 TABLET BY MOUTH EVERY DAY      busPIRone (BUSPAR) 5 mg tablet TAKE 1 TABLET BY MOUTH TWICE A DAY AS NEEDED      cyclobenzaprine (FLEXERIL) 10 mg tablet TAKE 1 TABLET BY MOUTH TWICE A DAY AS NEEDED      metFORMIN ER (GLUCOPHAGE XR) 500 mg tablet TAKE 1 TABLET BY MOUTH EVERY DAY      predniSONE (STERAPRED) 5 mg dose pack 1 (ONE) PACKAGE BY MOUTH USE AS DIRECTED PER INSTRUCTIONS IN PACK      budesonide (ENTOCORT EC) 3 mg capsule budesonide DR - ER 3 mg capsule,delayed,extended release   TAKE 1 CAPSULE BY MOUTH THREE TIMES DAILY      cholecalciferol (VITAMIN D3) (5000 Units /125 mcg) capsule Take  by mouth.  dicyclomine (BENTYL) 20 mg tablet dicyclomine 20 mg tablet   TAKE 1 (ONE) TABLET BY MOUTH EVERY SIX HOURS AS NEEDED      docusate sodium (COLACE) 100 mg capsule TAKE 1 CAPSULE BY MOUTH TWICE A DAY      ergocalciferol (ERGOCALCIFEROL) 1,250 mcg (50,000 unit) capsule ergocalciferol (vitamin D2) 1,250 mcg (50,000 unit) capsule   TAKE ONE CAPSULE BY MOUTH EVERY WEEK      etodolac (LODINE) 400 mg tablet TAKE 1 TABLET BY MOUTH TWICE A DAY      ferrous sulfate 325 mg (65 mg iron) tablet ferrous sulfate 325 mg (65 mg iron) tablet   TAKE 1 TABLET BY MOUTH DAILY      levothyroxine (synthroid) 50 mcg tablet Take  by mouth.       mesalamine (Pentasa) 500 mg CR capsule Pentasa 500 mg capsule,controlled release   TAKE 2 CAPSULES BY MOUTH TWICE DAILY      prenatal vit-iron fumarate-fa (PRENATAL PLUS with IRON) 28 mg iron- 800 mcg tab Take 1 Tab by mouth.  ibuprofen (MOTRIN) 600 mg tablet Take 1 Tab by mouth every six (6) hours as needed for Pain. 30 Tab 1     Current Facility-Administered Medications   Medication Dose Route Frequency Provider Last Rate Last Admin    triamcinolone acetonide (KENALOG-40) 40 mg/mL injection 40 mg  40 mg Other ONCE Robbin Rose MD        lidocaine (XYLOCAINE) 10 mg/mL (1 %) injection 1 mL  1 mL Other ONCE Ashli Rose MD          Patient Active Problem List   Diagnosis Code    Cellulitis L03.90    Anemia D64.9    Chest pain R07.9    Delivery of pregnancy by  section O82      Family History   Problem Relation Age of Onset    Hypertension Father     Heart Disease Father     Hypertension Paternal Grandfather     Hypertension Mother     Hypertension Brother     Cancer Neg Hx     Diabetes Neg Hx       Social History     Socioeconomic History    Marital status:      Spouse name: Not on file    Number of children: Not on file    Years of education: Not on file    Highest education level: Not on file   Tobacco Use    Smoking status: Never Smoker    Smokeless tobacco: Never Used   Substance and Sexual Activity    Alcohol use: Not Currently    Drug use: No    Sexual activity: Yes     Partners: Male   Other Topics Concern     Social Determinants of Health     Financial Resource Strain:     Difficulty of Paying Living Expenses:    Food Insecurity:     Worried About Running Out of Food in the Last Year:     Ran Out of Food in the Last Year:    Transportation Needs:     Lack of Transportation (Medical):      Lack of Transportation (Non-Medical):    Physical Activity:     Days of Exercise per Week:     Minutes of Exercise per Session:    Stress:     Feeling of Stress :    Social Connections:     Frequency of Communication with Friends and Family:     Frequency of Social Gatherings with Friends and Family:     Attends Latter day Services:     Active Member of Clubs or Organizations:     Attends Club or Organization Meetings:     Marital Status:       Past Surgical History:   Procedure Laterality Date    COLONOSCOPY N/A 9/1/2017    COLONOSCOPY, DIAGNOSTIC /c Bx performed by Michael Meyer MD at [facility]      Pt with 2 previous abortions, and multiple surgeries to release adhesions      Past Medical History:   Diagnosis Date    Anemia     Diabetes ([address]     Pt. states pre-diabetic    Female bladder prolapse     PCOS (polycystic ovarian syndrome)     Thyroid disease     Pt. states they are watching her levels.  Unspecified urinary incontinence     UTI (urinary tract infection)         I have reviewed and agree with PFSH and ROS and intake form in chart and the record furthermore I have reviewed prior medical record(s) regarding this patients care during this appointment. Review of Systems:   Patient is a pleasant appearing individual, appropriately dressed, well hydrated, well nourished, who is alert, appropriately oriented for age, and in no acute distress with a normal gait and normal affect who does not appear to be in any significant pain. Physical Exam:  Left 1st finger is grossly neurovascularly intact with good cap refill, slight decreased range of motion with slightly decreased strength, able to flex and extend against resistance, mild swelling, no instability and no other skin lesions noted. Right Hand - No point tenderness, Full range of motion, No instability, No Weakness, No, skin lesions, No swelling, Grossly neurovascularly intact. Procedure Documentation:    I discussed in detail the risks, benefits and complications of an injection which included but are not limited to infection, skin reactions, hot swollen joint, and anaphylaxis with the patient.  The patient verbalized understanding and gave informed consent for the injection. The patient's left finger was prepped using sterile alcohol solution. A sterile needle was inserted into the left finger and the mixture of 1 mL Lidocaine 1%, 1 mL Kenalog 40 mg was injected under sterile technique. The needle was withdrawn and the puncture site sealed with a Band-Aid. Technique: Under sterile conditions a [employer] ultrasound unit with a variable frequency (7.0-14.0 MHz) linear transducer was used to localize the placement of needle into the left finger. Findings: Successful needle placement for finger injection. Final images were taken and saved for permanent record. The patient tolerated the injection well. The patient was instructed to call the office immediately if there is any pain, redness, warmth, fever, or chills. Encounter Diagnoses     ICD-10-CM ICD-9-CM   1. Primary osteoarthritis of first carpometacarpal joint of left hand  M18.12 715.14   2. Left wrist pain  M25.532 719.43       HPI:  The patient is here with a chief complaint of left thumb pain, throbbing burning pain, progressively getting worse. Nothing has helped. Pain is 6/10. X-rays are positive for left thumb CMC arthritis. X-rays are positive for moderate OA. Assessment/Plan:  Plan would be for left thumb CMC cortisone injection. We will see the patient back in 3 to 4 weeks for routine followup. As part of continued conservative pain management options the patient was advised to utilize Tylenol or OTC NSAIDS as long as it is not medically contraindicated. Return to Office: Follow-up and Dispositions    · Return in about 4 weeks (around 8/27/2021). Scribed by Burke Horton as dictated by [facility] [place] NICK Persaud MD.  Documentation True and Accepted Robbin Persaud MD

## 2021-07-30 NOTE — LETTER
Sarah Campos   1980   [id number]       7/30/2021       I hereby authorize and direct Robbin Trinidad MD, Rosalba Dykes, and whomever he may designate as his associate to perform upon myself the following procedure:    Injection of: Kenalog, Supartz, Euflexxa, Orthovisc in the Right/Left ____________________. If any unforeseen condition arises in the course of the procedure, I further authorize him and his associated and/or assistant(s) to do whatever he/she deems advisable. The nature, purpose, benefits, risks, side effects, likelihood of achieving goals, and potential problems that might occur during recuperation, risks for not receiving the proposed care, treatment and services and alternatives of the procedure have been fully explained to me by my physician including, but not limited to:    Swelling, joint pain, skin pigment changes, worsening of condition, and failure to improve. I acknowledge that no guarantee or assurance has been made to me as to the results that may be obtained or the likelihood of success.                 _______________________________________     Signature of patient or authorized representative                [employer] [facility] fax: [phone]

## 2021-08-09 DIAGNOSIS — M79.642 LEFT HAND PAIN: ICD-10-CM

## 2021-09-10 NOTE — PROGRESS NOTES
[facility] - [address]  [phone] fax    Physical Therapy Discharge Summary    Patient name: Charisse Marvin Start of Care: 21   Referral source: Jose Eduardo Aly MD : 1980   Medical/Treatment Diagnosis: Left shoulder pain [M25.512]  Payor: [employer]ICA[employer] / Plan: [address] [place] [address] / Product Type: Managed Care Medicaid /  Onset Date:2021     Prior Hospitalization: see medical history Provider#: [id number]   Medications: Verified on Patient Summary List    Comorbidities: BMI over 30   Prior Level of Function: Patient is right hand dominant. Prior to most recent exacerbation of left shoulder pain patient was able to reach overhead without pain or restriction.       Visits from Start of Care: 6    Missed Visits: 2    Reporting Period : 21 to 21    Summary of Care:  1.  Pt will improve FOTO by 14 points in order to demonstrate functional improvement. 2.  Pt will improve left shoulder strength to 4+/5 in order to improve ease of lifting with ADLs and household management. 3.  Pt will improve left shoulder flexion/abduction AROM to 150* in order to improve ability with reaching overhead with ADLs.      Updated goals not assessed d/t unplanned DC     ASSESSMENT/RECOMMENDATIONS:    Pt was making slow progress in therapy. She continued to present with upper trap hypertonicity, likely with guarded posture as well as poor desk ergonomics with job tasks contributing. Pt had >1 month gap from therapy with reason unknown and then CX scheduled appointment d/t daughter in labor. Pt did not return to therapy and is DC at this time.       [x]Discontinue therapy: []Patient has reached or is progressing toward set goals      [x]Patient is non-compliant or has abdicated      []Due to lack of appreciable progress towards set goals    Jennifer Deluca, PT 9/10/2021 5:19 PM    NOTE TO PHYSICIAN:  Please complete the following and fax to: [facility] at [facility] at [phone]  Retain this original for your records. If you are unable to process this request in   24 hours, please contact our office.      [] I have read the above report and request that my patient continue therapy with the following changes/special instructions:  [] I have read the above report and request that my patient be discharged from therapy    Physician's Signature:_____________________ Date:___________Time:__________

## 2021-10-07 DIAGNOSIS — M79.641 RIGHT HAND PAIN: Primary | ICD-10-CM

## 2021-10-28 ENCOUNTER — TRANSCRIBE ORDER (OUTPATIENT)
Dept: SCHEDULING | Age: 41
End: 2021-10-28

## 2021-10-28 DIAGNOSIS — R92.8 ABNORMAL MAMMOGRAM: ICD-10-CM

## 2021-10-28 DIAGNOSIS — N64.89 BREASTS ASYMMETRICAL: ICD-10-CM

## 2021-10-28 DIAGNOSIS — N64.4 BREAST PAIN, LEFT: Primary | ICD-10-CM

## 2021-11-15 ENCOUNTER — HOSPITAL ENCOUNTER (OUTPATIENT)
Dept: MAMMOGRAPHY | Age: 41
Discharge: HOME | End: 2021-11-15
Attending: PHYSICIAN ASSISTANT
Payer: MEDICAID

## 2021-11-15 ENCOUNTER — HOSPITAL ENCOUNTER (OUTPATIENT)
Dept: ULTRASOUND IMAGING | Age: 41
Discharge: HOME | End: 2021-11-15
Attending: PHYSICIAN ASSISTANT
Payer: MEDICAID

## 2021-11-15 DIAGNOSIS — R92.8 ABNORMAL MAMMOGRAM: ICD-10-CM

## 2021-11-15 DIAGNOSIS — N64.4 BREAST PAIN, LEFT: ICD-10-CM

## 2021-11-15 DIAGNOSIS — N64.89 BREASTS ASYMMETRICAL: ICD-10-CM

## 2021-11-15 PROCEDURE — 77066 DX MAMMO INCL CAD BI: CPT

## 2021-11-15 PROCEDURE — 76642 ULTRASOUND BREAST LIMITED: CPT

## 2021-11-16 ENCOUNTER — HOSPITAL ENCOUNTER (EMERGENCY)
Age: 41
Discharge: HOME | End: 2021-11-16
Attending: STUDENT IN AN ORGANIZED HEALTH CARE EDUCATION/TRAINING PROGRAM
Payer: MEDICAID

## 2021-11-16 ENCOUNTER — APPOINTMENT (OUTPATIENT)
Dept: GENERAL RADIOLOGY | Age: 41
End: 2021-11-16
Attending: STUDENT IN AN ORGANIZED HEALTH CARE EDUCATION/TRAINING PROGRAM
Payer: MEDICAID

## 2021-11-16 VITALS
TEMPERATURE: 97.2 F | HEART RATE: 59 BPM | HEIGHT: 66 IN | DIASTOLIC BLOOD PRESSURE: 77 MMHG | SYSTOLIC BLOOD PRESSURE: 131 MMHG | WEIGHT: 200 LBS | BODY MASS INDEX: 32.14 KG/M2 | OXYGEN SATURATION: 99 % | RESPIRATION RATE: 19 BRPM

## 2021-11-16 DIAGNOSIS — R07.9 ACUTE CHEST PAIN: Primary | ICD-10-CM

## 2021-11-16 DIAGNOSIS — R94.31 T WAVE INVERSION IN EKG: ICD-10-CM

## 2021-11-16 LAB
ANION GAP SERPL CALC-SCNC: 4 MMOL/L (ref 3–18)
BASOPHILS # BLD: 0 K/UL (ref 0–0.1)
BASOPHILS NFR BLD: 0 % (ref 0–2)
BUN SERPL-MCNC: 11 MG/DL (ref 7–18)
BUN/CREAT SERPL: 13 (ref 12–20)
CALCIUM SERPL-MCNC: 9 MG/DL (ref 8.5–10.1)
CHLORIDE SERPL-SCNC: 107 MMOL/L (ref 100–111)
CO2 SERPL-SCNC: 26 MMOL/L (ref 21–32)
CREAT SERPL-MCNC: 0.82 MG/DL (ref 0.6–1.3)
DIFFERENTIAL METHOD BLD: NORMAL
EOSINOPHIL # BLD: 0.1 K/UL (ref 0–0.4)
EOSINOPHIL NFR BLD: 1 % (ref 0–5)
ERYTHROCYTE [DISTWIDTH] IN BLOOD BY AUTOMATED COUNT: 14.3 % (ref 11.6–14.5)
GLUCOSE SERPL-MCNC: 93 MG/DL (ref 74–99)
HCT VFR BLD AUTO: 40 % (ref 35–45)
HGB BLD-MCNC: 13.7 G/DL (ref 12–16)
IMM GRANULOCYTES # BLD AUTO: 0 K/UL (ref 0–0.04)
IMM GRANULOCYTES NFR BLD AUTO: 0 % (ref 0–0.5)
LYMPHOCYTES # BLD: 1.7 K/UL (ref 0.9–3.6)
LYMPHOCYTES NFR BLD: 31 % (ref 21–52)
MAGNESIUM SERPL-MCNC: 2.1 MG/DL (ref 1.6–2.6)
MCH RBC QN AUTO: 29.5 PG (ref 24–34)
MCHC RBC AUTO-ENTMCNC: 34.3 G/DL (ref 31–37)
MCV RBC AUTO: 86 FL (ref 78–100)
MONOCYTES # BLD: 0.4 K/UL (ref 0.05–1.2)
MONOCYTES NFR BLD: 7 % (ref 3–10)
NEUTS SEG # BLD: 3.3 K/UL (ref 1.8–8)
NEUTS SEG NFR BLD: 60 % (ref 40–73)
NRBC # BLD: 0 K/UL (ref 0–0.01)
NRBC BLD-RTO: 0 PER 100 WBC
PLATELET # BLD AUTO: 301 K/UL (ref 135–420)
PMV BLD AUTO: 10.1 FL (ref 9.2–11.8)
POTASSIUM SERPL-SCNC: 3.8 MMOL/L (ref 3.5–5.5)
RBC # BLD AUTO: 4.65 M/UL (ref 4.2–5.3)
SODIUM SERPL-SCNC: 137 MMOL/L (ref 136–145)
TROPONIN I SERPL-MCNC: <0.02 NG/ML (ref 0–0.04)
TROPONIN I SERPL-MCNC: <0.02 NG/ML (ref 0–0.04)
WBC # BLD AUTO: 5.5 K/UL (ref 4.6–13.2)

## 2021-11-16 PROCEDURE — 93005 ELECTROCARDIOGRAM TRACING: CPT

## 2021-11-16 PROCEDURE — 80048 BASIC METABOLIC PNL TOTAL CA: CPT

## 2021-11-16 PROCEDURE — 83735 ASSAY OF MAGNESIUM: CPT

## 2021-11-16 PROCEDURE — 85025 COMPLETE CBC W/AUTO DIFF WBC: CPT

## 2021-11-16 PROCEDURE — 74011250637 HC RX REV CODE- 250/637: Performed by: STUDENT IN AN ORGANIZED HEALTH CARE EDUCATION/TRAINING PROGRAM

## 2021-11-16 PROCEDURE — 71046 X-RAY EXAM CHEST 2 VIEWS: CPT

## 2021-11-16 PROCEDURE — 99284 EMERGENCY DEPT VISIT MOD MDM: CPT

## 2021-11-16 PROCEDURE — 84484 ASSAY OF TROPONIN QUANT: CPT

## 2021-11-16 RX ORDER — GUAIFENESIN 100 MG/5ML
324 LIQUID (ML) ORAL
Status: COMPLETED | OUTPATIENT
Start: 2021-11-16 | End: 2021-11-16

## 2021-11-16 RX ADMIN — ASPIRIN 81 MG CHEWABLE TABLET 324 MG: 81 TABLET CHEWABLE at 17:25

## 2021-11-16 NOTE — ED PROVIDER NOTES
EMERGENCY DEPARTMENT HISTORY AND PHYSICAL EXAM      Date: 11/16/2021  Patient Name: Frankie Talbot    History of Presenting Illness     Chief Complaint   Patient presents with    Abnormal Lab Results       History (Context): Frankie Talbot is a 39 y.o. female with a past medical history significant for anemia, diabetes, PCOS, and thyroid disease comes into the ED today due to chest pain. Patient was seen by her PCP earlier today and sent to the emergency department for further evaluation as there were new T wave inversions in the lateral leads of an EKG. Patient states chest pain has been ongoing for the past 24 hours. She states chest pain is located substernal and radiates to the left, exacerbated by laying down, without alleviating factors, described as dull and achy, constant, and not similar to any previous chest pain in the past.  Patient otherwise states she has been healthy without any fever, chills, dyspnea, cough, abdominal, nausea, vomiting, diarrhea, or diaphoresis. She does admit to an extensive family cardiac history with heart disease earlier than age 48. Patient denies any cardiac history for herself. She denies take any medication for treatment of her symptoms. Patient states symptoms have worsened since onset. Patient describes the severity as moderate.       PCP: Clifton Dwyer MD    Current Facility-Administered Medications   Medication Dose Route Frequency Provider Last Rate Last Admin    aspirin chewable tablet 324 mg  324 mg Oral NOW Jenny Thompson DO         Current Outpatient Medications   Medication Sig Dispense Refill    Junel FE 1/20, 28, 1 mg-20 mcg (21)/75 mg (7) tab TAKE 1 TABLET BY MOUTH EVERY DAY      busPIRone (BUSPAR) 5 mg tablet TAKE 1 TABLET BY MOUTH TWICE A DAY AS NEEDED      cyclobenzaprine (FLEXERIL) 10 mg tablet TAKE 1 TABLET BY MOUTH TWICE A DAY AS NEEDED      metFORMIN ER (GLUCOPHAGE XR) 500 mg tablet TAKE 1 TABLET BY MOUTH EVERY DAY      predniSONE (STERAPRED) 5 mg dose pack 1 (ONE) PACKAGE BY MOUTH USE AS DIRECTED PER INSTRUCTIONS IN PACK      budesonide (ENTOCORT EC) 3 mg capsule budesonide DR - ER 3 mg capsule,delayed,extended release   TAKE 1 CAPSULE BY MOUTH THREE TIMES DAILY      cholecalciferol (VITAMIN D3) (5000 Units /125 mcg) capsule Take  by mouth.  dicyclomine (BENTYL) 20 mg tablet dicyclomine 20 mg tablet   TAKE 1 (ONE) TABLET BY MOUTH EVERY SIX HOURS AS NEEDED      docusate sodium (COLACE) 100 mg capsule TAKE 1 CAPSULE BY MOUTH TWICE A DAY      ergocalciferol (ERGOCALCIFEROL) 1,250 mcg (50,000 unit) capsule ergocalciferol (vitamin D2) 1,250 mcg (50,000 unit) capsule   TAKE ONE CAPSULE BY MOUTH EVERY WEEK      etodolac (LODINE) 400 mg tablet TAKE 1 TABLET BY MOUTH TWICE A DAY      ferrous sulfate 325 mg (65 mg iron) tablet ferrous sulfate 325 mg (65 mg iron) tablet   TAKE 1 TABLET BY MOUTH DAILY      levothyroxine (synthroid) 50 mcg tablet Take  by mouth.  mesalamine (Pentasa) 500 mg CR capsule Pentasa 500 mg capsule,controlled release   TAKE 2 CAPSULES BY MOUTH TWICE DAILY      prenatal vit-iron fumarate-fa (PRENATAL PLUS with IRON) 28 mg iron- 800 mcg tab Take 1 Tab by mouth.  ibuprofen (MOTRIN) 600 mg tablet Take 1 Tab by mouth every six (6) hours as needed for Pain. 30 Tab 1       Past History     Past Medical History:   Past Medical History:   Diagnosis Date    Anemia     Diabetes ([address]     Pt. states pre-diabetic    Female bladder prolapse     PCOS (polycystic ovarian syndrome)     Thyroid disease     Pt. states they are watching her levels.     Unspecified urinary incontinence     UTI (urinary tract infection)        Past Surgical History:  Past Surgical History:   Procedure Laterality Date    COLONOSCOPY N/A 9/1/2017    COLONOSCOPY, DIAGNOSTIC /c Bx performed by Nayana Ruiz MD at [facility]      Pt with 2 previous abortions, and multiple surgeries to release adhesions       Family History:  Family History   Problem Relation Age of Onset    Hypertension Father     Heart Disease Father     Hypertension Paternal Grandfather     Hypertension Mother     Hypertension Brother     Hypertension Maternal Aunt     Hypertension Paternal Aunt     Cancer Cousin     Diabetes Neg Hx        Social History:   Social History     Tobacco Use    Smoking status: Never Smoker    Smokeless tobacco: Never Used   Substance Use Topics    Alcohol use: Not Currently    Drug use: No       Allergies:  No Known Allergies    PMH, PSH, family history, social history, allergies reviewed with the patient with significant items noted above. Review of Systems   Review of Systems   Constitutional: Negative for chills and fever. HENT: Negative for sore throat. Eyes: Negative for visual disturbance. Respiratory: Negative for shortness of breath. Cardiovascular: Positive for chest pain. Gastrointestinal: Negative for abdominal pain, nausea and vomiting. Genitourinary: Negative for difficulty urinating. Musculoskeletal: Negative for myalgias. Skin: Negative for rash. Neurological: Negative for headaches. Physical Exam     Vitals:    11/16/21 1636   BP: (!) 159/98   Pulse: 66   Resp: 19   Temp: 97.2 °F (36.2 °C)   SpO2: 100%   Weight: 90.7 kg (200 lb)   Height: 5' 6\" (1.676 m)       Physical Exam  Vitals and nursing note reviewed. Constitutional:       General: She is not in acute distress. Appearance: Normal appearance. She is not ill-appearing or toxic-appearing. HENT:      Head: Normocephalic and atraumatic. Mouth/Throat:      Mouth: Mucous membranes are moist.   Eyes:      General: No scleral icterus. Conjunctiva/sclera: Conjunctivae normal.   Cardiovascular:      Rate and Rhythm: Normal rate and regular rhythm. Comments: Normal peripheral perfusion  Pulmonary:      Effort: Pulmonary effort is normal. No respiratory distress.    Abdominal: General: There is no distension. Palpations: Abdomen is soft. Tenderness: There is no abdominal tenderness. Musculoskeletal:         General: No swelling, tenderness or deformity. Normal range of motion. Cervical back: Normal range of motion and neck supple. Skin:     General: Skin is warm and dry. Findings: No rash. Neurological:      General: No focal deficit present. Mental Status: She is alert and oriented to person, place, and time. Mental status is at baseline. Psychiatric:         Mood and Affect: Mood normal.         Thought Content: Thought content normal.         Diagnostic Study Results     Labs -     Recent Results (from the past 12 hour(s))   EKG, 12 LEAD, INITIAL    Collection Time: 11/16/21  4:44 PM   Result Value Ref Range    Ventricular Rate 64 BPM    Atrial Rate 64 BPM    P-R Interval 178 ms    QRS Duration 92 ms    Q-T Interval 438 ms    QTC Calculation (Bezet) 451 ms    Calculated P Axis 50 degrees    Calculated R Axis 3 degrees    Calculated T Axis 41 degrees    Diagnosis       Normal sinus rhythm  Possible Left atrial enlargement  Nonspecific T wave abnormality  Abnormal ECG  When compared with ECG of 08-SEP-2017 10:37,  No significant change was found        Labs Reviewed   CBC WITH AUTOMATED DIFF   METABOLIC PANEL, BASIC   MAGNESIUM       Radiologic Studies -   XR CHEST PA LAT    (Results Pending)     CT Results  (Last 48 hours)    None        CXR Results  (Last 48 hours)    None          The laboratory results, imaging results, and other diagnostic exams were reviewed in the EMR. Medical Decision Making   I am the first provider for this patient. I reviewed the vital signs, available nursing notes, past medical history, past surgical history, family history and social history. Vital Signs-Reviewed the patient's vital signs. ED EKG interpretation:  Rhythm: normal sinus rhythm; and regular .  Rate (approx.): 64; Axis: normal; P wave: normal; QRS interval: normal ; ST/T wave: T wave inverted; Other findings: abnormal ekg. This EKG was interpreted by Red Astorga D.O. Records Reviewed: Personally, on initial evaluation    MDM:   Salazar Campbell presents with complaint of chest pain  DDX includes but is not limited to: ACS, atypical chest pain, musculoskeletal strain    Patient overall well-appearing, no acute distress, vital signs grossly within normal limits. Will obtain lab work and imaging for further evaluation of patients complaint. Will continue to monitor and evaluate patient while in the ED. Orders as below:  Orders Placed This Encounter    XR CHEST PA LAT    CBC WITH AUTOMATED DIFF    BASIC METABOLIC PANEL    MAGNESIUM    EKG, 12 LEAD, INITIAL    EKG, 12 LEAD, INITIAL    aspirin chewable tablet 324 mg        ED Course:   ED Course as of 11/16/21 1947 Tue Nov 16, 2021 1803 Patient's lab work grossly within normal limits. Patient's chest x-ray does not show any acute cardiopulmonary abnormalities will obtain repeat troponin at this time for further disposition. [DV]   1947 His repeat troponin negative. Will discharge patient home with return precautions and follow-up recommendations. Patient verbalized understanding and is without any further questions. [DV]      ED Course User Index  [DV] Rebeca Proctor DO           Procedures:  Procedures        Diagnosis and Disposition     CLINICAL IMPRESSION:  No diagnosis found. Current Discharge Medication List          Disposition: Home    Patient condition at time of disposition: Stable    DISCHARGE NOTE:   Pt has been reexamined. Patient has no new complaints, changes, or physical findings. Care plan outlined and precautions discussed. Results were reviewed with the patient. All medications were reviewed with the patient. All of pt's questions and concerns were addressed.   Alarm symptoms and return precautions associated with chief complaint and evaluation were reviewed with the patient in detail. The patient demonstrated adequate understanding. Patient was instructed to follow up with PCP, Cardiology, as well as strict return precautions to the ED upon further deterioration. Patient is ready to go home. The patient is happy with this plan      Dragon Disclaimer     Please note that this dictation was completed with PLAYD8, the computer voice recognition software. Quite often unanticipated grammatical, syntax, homophones, and other interpretive errors are inadvertently transcribed by the computer software. Please disregard these errors. Please excuse any errors that have escaped final proofreading. Fredo LINN.

## 2021-11-16 NOTE — ED NOTES
Pt states she was seen at [facility] and was advised her to come here d/t possible abnormal ekg      Pt in no distress.

## 2021-11-17 LAB
ATRIAL RATE: 64 BPM
CALCULATED P AXIS, ECG09: 50 DEGREES
CALCULATED R AXIS, ECG10: 3 DEGREES
CALCULATED T AXIS, ECG11: 41 DEGREES
DIAGNOSIS, 93000: NORMAL
P-R INTERVAL, ECG05: 178 MS
Q-T INTERVAL, ECG07: 438 MS
QRS DURATION, ECG06: 92 MS
QTC CALCULATION (BEZET), ECG08: 451 MS
VENTRICULAR RATE, ECG03: 64 BPM

## 2021-11-17 NOTE — ED NOTES
Pt. Provided with d/c papers. Pt. Leaves with visitor. NAD. [Alert] : alert [Healthy Appearance] : healthy appearance [No Proptosis] : no proptosis [Normal Voice/Communication] : normal voice communication [No Lid Lag] : no lid lag [Thyroid Not Enlarged] : the thyroid was not enlarged [Normal Hearing] : hearing was normal [No Thyroid Nodules] : there were no palpable thyroid nodules [Clear to Auscultation] : lungs were clear to auscultation bilaterally [Normal S1, S2] : normal S1 and S2 [Pedal Pulses Normal] : the pedal pulses are present [Regular Rhythm] : with a regular rhythm [No Edema] : there was no peripheral edema [No Stigmata of Cushings Syndrome] : no stigmata of cushings syndrome [Normal Sensation on Monofilament Testing] : normal sensation on monofilament testing of lower extremities [Normal Affect] : the affect was normal [Normal Mood] : the mood was normal [Foot Ulcers] : no foot ulcers [de-identified] : augustin 230, steady [de-identified] : moderate acanthosis nigricans

## 2022-02-11 ENCOUNTER — TRANSCRIBE ORDER (OUTPATIENT)
Dept: SCHEDULING | Age: 42
End: 2022-02-11

## 2022-02-11 DIAGNOSIS — Z12.31 VISIT FOR SCREENING MAMMOGRAM: Primary | ICD-10-CM

## 2022-02-22 ENCOUNTER — HOSPITAL ENCOUNTER (OUTPATIENT)
Dept: MAMMOGRAPHY | Age: 42
Discharge: HOME | End: 2022-02-22

## 2022-02-22 DIAGNOSIS — Z12.31 VISIT FOR SCREENING MAMMOGRAM: ICD-10-CM

## 2022-03-04 ENCOUNTER — OFFICE VISIT (OUTPATIENT)
Dept: ORTHOPEDIC SURGERY | Age: 42
End: 2022-03-04
Payer: MEDICAID

## 2022-03-04 DIAGNOSIS — M18.12 PRIMARY OSTEOARTHRITIS OF FIRST CARPOMETACARPAL JOINT OF LEFT HAND: Primary | ICD-10-CM

## 2022-03-04 PROCEDURE — 99213 OFFICE O/P EST LOW 20 MIN: CPT | Performed by: ORTHOPAEDIC SURGERY

## 2022-03-04 RX ORDER — ROSUVASTATIN CALCIUM 20 MG/1
20 TABLET, COATED ORAL
COMMUNITY
Start: 2022-01-08

## 2022-03-04 RX ORDER — AMLODIPINE BESYLATE 5 MG/1
5 TABLET ORAL DAILY
COMMUNITY
Start: 2022-01-10

## 2022-03-04 RX ORDER — CARBAMIDE PEROXIDE 6.5 %
DROPS OTIC (EAR)
COMMUNITY
Start: 2022-02-10 | End: 2022-10-19

## 2022-03-04 NOTE — LETTER
3/9/2022    Patient: Korey Shelton   YOB: 1980   Date of Visit: 3/4/2022     Ej Forrester MD  [address] [address]  Via Fax: [phone]    Dear Ej Forrester MD,      Thank you for referring Ms. Yolande Fuentes to [address] [place] [address] AND SPORTS MEDICINE for evaluation. My notes for this consultation are attached. If you have questions, please do not hesitate to call me. I look forward to following your patient along with you.       Sincerely,    Shraddha Tripp MD

## 2022-03-04 NOTE — PROGRESS NOTES
Name: Korey Shelton    : 1980     Service Dept: [address] and Sports Medicine    Chief Complaint   Patient presents with    Hand Pain        There were no vitals taken for this visit. Allergies   Allergen Reactions    Rosuvastatin Other (comments)     ANXIETY        Current Outpatient Medications   Medication Sig Dispense Refill    amLODIPine (NORVASC) 5 mg tablet Take 5 mg by mouth daily.  Ear Wax Removal Drops 6.5 % otic solution 5 DROPS METRIC DROP TWICE A DAY FOR 4 DAYS      rosuvastatin (CRESTOR) 20 mg tablet Take 20 mg by mouth nightly.  Junel FE 1/20, , 1 mg-20 mcg (21)/75 mg (7) tab TAKE 1 TABLET BY MOUTH EVERY DAY      busPIRone (BUSPAR) 5 mg tablet TAKE 1 TABLET BY MOUTH TWICE A DAY AS NEEDED      cyclobenzaprine (FLEXERIL) 10 mg tablet TAKE 1 TABLET BY MOUTH TWICE A DAY AS NEEDED      metFORMIN ER (GLUCOPHAGE XR) 500 mg tablet TAKE 1 TABLET BY MOUTH EVERY DAY      predniSONE (STERAPRED) 5 mg dose pack 1 (ONE) PACKAGE BY MOUTH USE AS DIRECTED PER INSTRUCTIONS IN PACK      budesonide (ENTOCORT EC) 3 mg capsule budesonide DR - ER 3 mg capsule,delayed,extended release   TAKE 1 CAPSULE BY MOUTH THREE TIMES DAILY      cholecalciferol (VITAMIN D3) (5000 Units /125 mcg) capsule Take  by mouth.  dicyclomine (BENTYL) 20 mg tablet dicyclomine 20 mg tablet   TAKE 1 (ONE) TABLET BY MOUTH EVERY SIX HOURS AS NEEDED      docusate sodium (COLACE) 100 mg capsule TAKE 1 CAPSULE BY MOUTH TWICE A DAY      ergocalciferol (ERGOCALCIFEROL) 1,250 mcg (50,000 unit) capsule ergocalciferol (vitamin D2) 1,250 mcg (50,000 unit) capsule   TAKE ONE CAPSULE BY MOUTH EVERY WEEK      etodolac (LODINE) 400 mg tablet TAKE 1 TABLET BY MOUTH TWICE A DAY      ferrous sulfate 325 mg (65 mg iron) tablet ferrous sulfate 325 mg (65 mg iron) tablet   TAKE 1 TABLET BY MOUTH DAILY      levothyroxine (synthroid) 50 mcg tablet Take  by mouth.       mesalamine (Pentasa) 500 mg CR capsule Pentasa 500 mg capsule,controlled release   TAKE 2 CAPSULES BY MOUTH TWICE DAILY      prenatal vit-iron fumarate-fa (PRENATAL PLUS with IRON) 28 mg iron- 800 mcg tab Take 1 Tab by mouth.  ibuprofen (MOTRIN) 600 mg tablet Take 1 Tab by mouth every six (6) hours as needed for Pain. 30 Tab 1      Patient Active Problem List   Diagnosis Code    Cellulitis L03.90    Anemia D64.9    Chest pain R07.9    Delivery of pregnancy by  section O82    Dyspareunia [id number]    Primary osteoarthritis of first carpometacarpal joint of left hand M18.12      Family History   Problem Relation Age of Onset    Hypertension Father     Heart Disease Father     Hypertension Paternal Grandfather     Hypertension Mother     Hypertension Brother     Hypertension Maternal Aunt     Hypertension Paternal Aunt     Cancer Cousin     Diabetes Neg Hx       Social History     Socioeconomic History    Marital status:    Tobacco Use    Smoking status: Never Smoker    Smokeless tobacco: Never Used   Substance and Sexual Activity    Alcohol use: Not Currently    Drug use: No    Sexual activity: Yes     Partners: Male      Past Surgical History:   Procedure Laterality Date    COLONOSCOPY N/A 2017    COLONOSCOPY, DIAGNOSTIC /c Bx performed by Robert Farris MD at [facility] [place] [facility]      Pt with 2 previous abortions, and multiple surgeries to release adhesions      Past Medical History:   Diagnosis Date    Anemia     Diabetes ([address]     Pt. states pre-diabetic    Female bladder prolapse     PCOS (polycystic ovarian syndrome)     Thyroid disease     Pt. states they are watching her levels.  Unspecified urinary incontinence     UTI (urinary tract infection)         I have reviewed and agree with PFSH and ROS and intake form in chart and the record furthermore I have reviewed prior medical record(s) regarding this patients care during this appointment.      Review of Systems:   Patient is a pleasant appearing individual, appropriately dressed, well hydrated, well nourished, who is alert, appropriately oriented for age, and in no acute distress with a normal gait and normal affect who does not appear to be in any significant pain. Physical Exam:  Left thumb is grossly neurovascularly intact with good cap refill, slight decreased range of motion with slightly decreased strength, able to flex and extend against resistance, mild swelling, no instability and no other skin lesions noted, positive CMC grind test with positive crepitation. Right thumb is grossly neurovascularly intact with good cap refill, full range of motion, full strength, able to flex and extend against resistance, no swelling, no instability, no skin lesions noted, negative CMC grind test, no crepitation. Encounter Diagnoses     ICD-10-CM ICD-9-CM   1. Primary osteoarthritis of first carpometacarpal joint of left hand  M18.12 715.14       HPI:  The patient is here with a chief complaint of left hand pain, throbbing, burning pain, numbness, progressively getting worse. X-rays are positive for moderate OA with CMC arthritis. Assessment/Plan:  Plan will be for MRI of the left hand/thumb area, and we will see her back post MRI and go from there. As part of continued conservative pain management options the patient was advised to utilize Tylenol or OTC NSAIDS as long as it is not medically contraindicated. Return to Office: Follow-up and Dispositions    · Return for POST MRI. Scribed by Charles Cruz LPN as dictated by [facility] [place] NICK Kohli MD.  Documentation True and Accepted [facility] NICK Kohli MD

## 2022-03-04 NOTE — PATIENT INSTRUCTIONS

## 2022-03-18 PROBLEM — M18.12 PRIMARY OSTEOARTHRITIS OF FIRST CARPOMETACARPAL JOINT OF LEFT HAND: Status: ACTIVE | Noted: 2022-03-04

## 2022-03-18 PROBLEM — R07.9 CHEST PAIN: Status: ACTIVE | Noted: 2017-10-06

## 2022-03-22 ENCOUNTER — HOSPITAL ENCOUNTER (OUTPATIENT)
Age: 42
Discharge: HOME | End: 2022-03-22
Payer: MEDICAID

## 2022-03-22 DIAGNOSIS — M18.12 PRIMARY OSTEOARTHRITIS OF FIRST CARPOMETACARPAL JOINT OF LEFT HAND: ICD-10-CM

## 2022-03-22 PROCEDURE — 73218 MRI UPPER EXTREMITY W/O DYE: CPT

## 2022-03-25 ENCOUNTER — OFFICE VISIT (OUTPATIENT)
Dept: ORTHOPEDIC SURGERY | Age: 42
End: 2022-03-25
Payer: MEDICAID

## 2022-03-25 DIAGNOSIS — M18.12 PRIMARY OSTEOARTHRITIS OF FIRST CARPOMETACARPAL JOINT OF LEFT HAND: Primary | ICD-10-CM

## 2022-03-25 PROCEDURE — 99214 OFFICE O/P EST MOD 30 MIN: CPT | Performed by: ORTHOPAEDIC SURGERY

## 2022-03-25 RX ORDER — DICLOFENAC SODIUM 10 MG/G
2 GEL TOPICAL 4 TIMES DAILY
Qty: 200 G | Refills: 2 | Status: SHIPPED | OUTPATIENT
Start: 2022-03-25

## 2022-03-25 RX ORDER — SERTRALINE HYDROCHLORIDE 50 MG/1
25 TABLET, FILM COATED ORAL DAILY
COMMUNITY
Start: 2022-03-20

## 2022-03-25 NOTE — PATIENT INSTRUCTIONS

## 2022-03-25 NOTE — LETTER
3/28/2022    Patient: Alana Salvador   YOB: 1980   Date of Visit: 3/25/2022     Buck Uriarte MD  [address] [address]  Via Fax: [phone]    Dear Buck Uriarte MD,      Thank you for referring Ms. Riddhi Ravi to [address] [place], [place] [address] AND SP[facility]TS MEDICINE for evaluation. My notes for this consultation are attached. If you have questions, please do not hesitate to call me. I look forward to following your patient along with you.       Sincerely,    Benita Arroyo MD

## 2022-03-25 NOTE — PROGRESS NOTES
Name: Viral Khan    : 1980     Service Dept: [address] [facility]    Chief Complaint   Patient presents with    Hand Pain        There were no vitals taken for this visit. Allergies   Allergen Reactions    Rosuvastatin Other (comments)     ANXIETY        Current Outpatient Medications   Medication Sig Dispense Refill    sertraline (ZOLOFT) 50 mg tablet Take 25 mg by mouth daily.  diclofenac (VOLTAREN) 1 % gel Apply 2 g to affected area four (4) times daily. Apply 2 grams to affected area 4x a day 200 g 2    amLODIPine (NORVASC) 5 mg tablet Take 5 mg by mouth daily.  Ear Wax Removal Drops 6.5 % otic solution 5 DROPS METRIC DROP TWICE A DAY FOR 4 DAYS      rosuvastatin (CRESTOR) 20 mg tablet Take 20 mg by mouth nightly.  Junel FE 1/20, , 1 mg-20 mcg (21)/75 mg (7) tab TAKE 1 TABLET BY MOUTH EVERY DAY      busPIRone (BUSPAR) 5 mg tablet TAKE 1 TABLET BY MOUTH TWICE A DAY AS NEEDED      cyclobenzaprine (FLEXERIL) 10 mg tablet TAKE 1 TABLET BY MOUTH TWICE A DAY AS NEEDED      metFORMIN ER (GLUCOPHAGE XR) 500 mg tablet TAKE 1 TABLET BY MOUTH EVERY DAY      predniSONE (STERAPRED) 5 mg dose pack 1 (ONE) PACKAGE BY MOUTH USE AS DIRECTED PER INSTRUCTIONS IN PACK      budesonide (ENTOCORT EC) 3 mg capsule budesonide DR - ER 3 mg capsule,delayed,extended release   TAKE 1 CAPSULE BY MOUTH THREE TIMES DAILY      cholecalciferol (VITAMIN D3) (5000 Units /125 mcg) capsule Take  by mouth.       dicyclomine (BENTYL) 20 mg tablet dicyclomine 20 mg tablet   TAKE 1 (ONE) TABLET BY MOUTH EVERY SIX HOURS AS NEEDED      docusate sodium (COLACE) 100 mg capsule TAKE 1 CAPSULE BY MOUTH TWICE A DAY      ergocalciferol (ERGOCALCIFEROL) 1,250 mcg (50,000 unit) capsule ergocalciferol (vitamin D2) 1,250 mcg (50,000 unit) capsule   TAKE ONE CAPSULE BY MOUTH EVERY WEEK      etodolac (LODINE) 400 mg tablet TAKE 1 TABLET BY MOUTH TWICE A DAY      ferrous sulfate 325 mg (65 mg iron) tablet ferrous sulfate 325 mg (65 mg iron) tablet   TAKE 1 TABLET BY MOUTH DAILY      levothyroxine (synthroid) 50 mcg tablet Take  by mouth.  mesalamine (Pentasa) 500 mg CR capsule Pentasa 500 mg capsule,controlled release   TAKE 2 CAPSULES BY MOUTH TWICE DAILY      prenatal vit-iron fumarate-fa (PRENATAL PLUS with IRON) 28 mg iron- 800 mcg tab Take 1 Tab by mouth.  ibuprofen (MOTRIN) 600 mg tablet Take 1 Tab by mouth every six (6) hours as needed for Pain. 30 Tab 1      Patient Active Problem List   Diagnosis Code    Cellulitis L03.90    Anemia D64.9    Chest pain R07.9    Delivery of pregnancy by  section O82    Dyspareunia [id number]    Primary osteoarthritis of first carpometacarpal joint of left hand M18.12      Family History   Problem Relation Age of Onset    Hypertension Father     Heart Disease Father     Hypertension Paternal Grandfather     Hypertension Mother     Hypertension Brother     Hypertension Maternal Aunt     Hypertension Paternal Aunt     Cancer Cousin     Diabetes Neg Hx       Social History     Socioeconomic History    Marital status:    Tobacco Use    Smoking status: Never Smoker    Smokeless tobacco: Never Used   Substance and Sexual Activity    Alcohol use: Not Currently    Drug use: No    Sexual activity: Yes     Partners: Male      Past Surgical History:   Procedure Laterality Date    COLONOSCOPY N/A 2017    COLONOSCOPY, DIAGNOSTIC /c Bx performed by Jose Luis Black MD at [facility]      Pt with 2 previous abortions, and multiple surgeries to release adhesions      Past Medical History:   Diagnosis Date    Anemia     Diabetes ([address]     Pt. states pre-diabetic    Female bladder prolapse     PCOS (polycystic ovarian syndrome)     Thyroid disease     Pt. states they are watching her levels.     Unspecified urinary incontinence     UTI (urinary tract infection)         I have reviewed and agree with PFSH and ROS and intake form in chart and the record furthermore I have reviewed prior medical record(s) regarding this patients care during this appointment. Review of Systems:   Patient is a pleasant appearing individual, appropriately dressed, well hydrated, well nourished, who is alert, appropriately oriented for age, and in no acute distress with a normal gait and normal affect who does not appear to be in any significant pain. Physical Exam:  Left thumb is grossly neurovascularly intact with good cap refill, slight decreased range of motion with slightly decreased strength, able to flex and extend against resistance, mild swelling, no instability and no other skin lesions noted, positive CMC grind test with positive crepitation. Right thumb is grossly neurovascularly intact with good cap refill, full range of motion, full strength, able to flex and extend against resistance, no swelling, no instability, no skin lesions noted, negative CMC grind test, no crepitation. Encounter Diagnoses     ICD-10-CM ICD-9-CM   1. Primary osteoarthritis of first carpometacarpal joint of left hand  M18.12 715.14       HPI:  The patient is here with a chief complaint of left hand pain. Status post MRI, which shows she has got CMC arthritis, mild in nature. Nothing surgical.  Pain is 3/10. Assessment/Plan:  Plan at this point, we will get her into occupational therapy, Voltaren gel. We will see her back as needed and go from there. As part of continued conservative pain management options the patient was advised to utilize Tylenol or OTC NSAIDS as long as it is not medically contraindicated. Return to Office: Follow-up and Dispositions    · Return if symptoms worsen or fail to improve. Scribed by Merari Ocampo LPN as dictated by [facility] NICK Busby MD.  Documentation True and Accepted Robbin Busby MD

## 2022-04-04 ENCOUNTER — HOSPITAL ENCOUNTER (OUTPATIENT)
Dept: PHYSICAL THERAPY | Age: 42
Discharge: HOME | End: 2022-04-04
Payer: MEDICAID

## 2022-04-04 PROCEDURE — 97165 OT EVAL LOW COMPLEX 30 MIN: CPT

## 2022-04-04 NOTE — PROGRESS NOTES
[facility]  [place]  [address] [facility]  [phone] fax    Plan of Care/Statement of Necessity for Occupational Therapy Services    Patient name: Yanira Torres Start of Care: 2022   Referral source: Yuly Bianchi MD : 1980    Medical Diagnosis: Left hand pain [M79.642]  Payor: BLUE CROSS MEDICAID / Plan: [facility] / Product Type: Managed Care Medicaid /  Onset Date:Increasing over several months    Treatment Diagnosis: pain left wrist thumb   Prior Hospitalization: see medical history Provider#: [id number]   Medications: Verified on Patient summary List    Comorbidities: HTN, arthritis   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 Assessment/ key information: 39year old RHD female with two month history of increasing pain in left thumb without relief from injection. Pain is present at level of 7/10 and tenderness is present at [facility] and snuff box as well as thenar eminence. Her wrist AROM is slightly reduced in flexion ( left 60, right 70), extension 52 (right 85). Thumb AROM is reduced at MP 60 (right 75), IP 55 ( right 80). Abduction is essentially equal.  She has pain with all types of pinches and strength of  is reduced at 40# ( right 62), pinches 6-10# (right 12-19). She reports difficulty with fasteners, opening packages, lifting, pulling clothing and managing kitchen tasks both at home and for work. Her FOTO is 39/100 reflecting severe impairment in function. She will benefit from skilled occupational therapy to improve left hand function and reduce pain.      Evaluation Complexity: History LOW Complexity : Brief history review  Examination LOW Complexity : 1-3 performance deficits relating to physical, cognitive , or psychosocial skils that result in activity limitations and / or participation restrictions  Clinical Decision Making LOW Complexity : No comorbidities that affect functional and no verbal or physical assistance needed to complete eval tasks   Overall Complexity Rating: LOW     Problem List: Pain effecting function, Decreased range of motion, Decreased strength, Decreased ADL/functional abilities  and Decreased activity tolerance     Treatment Plan may include any combination of the following: Therapeutic exercise, Therapeutic activities, Physical agent/modality, Splinting/orthoses, Patient education and ADLs/IADLs    Patient / Family readiness to learn indicated by: asking questions, trying to perform skills and interest    Persons(s) to be included in education:   patient (P)    Barriers to Learning/Limitations: None    Patient Goal (s): Get rid of pain     Patient Self Reported Health Status: Failed to answer    Rehabilitation Potential: good    Short Term Goals: To be accomplished in 2 weeks:  1. Patient will be independent in home exercise program for wrist and thumb ROM. 2.  Patient will report pain in left hand reduced to 4/10 with use of modalities in clinic. 3.  patient will be familiar with task modifications to reduce pain in kitchen and dressing tasks. Long Term Goals: To be accomplished in 4 weeks:   1. Patient will report pain diminished to 0-2/10 with routine use  2. Patient will increase thumb MILLER at least 25 degrees in flexion for movement of items across palm. 3.  Patient will increase  strength in left hand by 15pounds to increase ease of opening jars  4. Patient will improve  resistive pinches with left hand without pain  At least 4# for opening packages.       Frequency / Duration: Patient to be seen 2 times per week for 4 weeks:    Patient/ Caregiver education and instruction: Diagnosis, prognosis, self care, activity modification and exercises   [x]  Plan of care has been reviewed with Guillaume Waller OTR/L 4/4/2022 3:45 PM    _____________________________________________________________________    I certify that the above Therapy Services are being furnished while the patient is under my care. I agree with the treatment plan and certify that this therapy is necessary.     [de-identified] Signature:____________Date:_________TIME:________     Carolyn Stone MD  ** Signature, Date and Time must be completed for valid certification **    Please sign and return to [facility]  [employer] [facility]   [address] [place] [address]  [phone] fax

## 2022-04-04 NOTE — PROGRESS NOTES
OT DAILY TREATMENT NOTE/HAND EVAL     Patient Name: Soo Hartley  Date:2022  : 1980  [x]  Patient  Verified  Payor: BLUE CROSS MEDICAID / Plan: [facility] / Product Type: Managed Care Medicaid /    In time:310  Out time:340  Total Treatment Time (min): 30  Visit #: 1 of 8    Medicare/BCBS Only   Total Timed Codes (min):  0 1:1 Treatment Time:  30     Treatment Area: Left hand pain [M79.642]  SUBJECTIVE  Pain Level (0-10 scale): 7/10  []constant []intermittent []improving [x]worsening []no change since onset    Pain Rating:   Current: (0-no pain 10-debilitating pain) severe   At best: (0-no pain 10-debilitating pain) moderate  At worst: (0-no pain 10-debilitating pain) severe  Location: left hand and left thumb  Type:  severe   Better with: Worse with:      Any medication changes, allergies to medications, adverse drug reactions, diagnosis change, or new procedure performed?: [x] No    [] Yes (see summary sheet for update)  Subjective functional status/changes:     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, I self care, home care, driving,   Limitations to PLOF: typing, opening containers  Mechanism of Injury: none  Current symptoms/Complaints: Pain  Previous Treatment/Compliance: unknown  PMHx/Surgical Hx: HTN  Work Hx: prior ,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  Living Situation: with , 20,18,10,2  Pt Goals: lessen the pain  Barriers: []pain []financial []time []transportation []other  Motivation: Good  Substance use: []Alcohol []Tobacco []other:     Cognition: A & O x 4    Other:    OBJECTIVE/EXAMINATION  Domestic Life: with spouse and children, primary cook and home care,   Activity/Recreational Limitations: none   Mobility: I  Self Care: *fixing hair, hooking bra, opening packages, cans jars    Precautions:    Hand Dominance: right handed   Hand Involved: left    Social History:     Occupation/Job Requirements: Typing, lifting, opening containers packages, dressing others, home care       30 min [x]Eval                  []Re-Eval                 With   [] TE   [] TA   [] neuro   [] other: Patient Education: [x] Review HEP    [] Progressed/Changed HEP based on: OT POC  [] positioning   [] body mechanics   [] transfers   [] heat/ice application    [] other:      Other Objective/Functional Measures:     Observation:   Scar/incision:   NA  Location:  Left snuff box and thenar eminence     Palpation:  Tender snuff box, thenar eminence and CMC    Range of Motion:   **  Forearm Supination 0-80  4/4  4/4         Pronation 0-80  right left        Wrist Flex 0-80  70  60         Ext 0-70  85  52         Ulnar Dev 0-30  32  35         Radial Dev 0-20  30  25         Hand ROM R/L       Thumb                CMC             75/60 MP             80/55 IP              55/50 Yanes Abd              60/55 Radial Abd         Strength:    Hand Strength:    Gross Grasp 3pt Pinch Lateral Pinch Tip Pinch   Right   62 16  19  12    Left  40  8p  10p  6p         Sensation:    No changes noted        Special Tests:   Tenderness L R Test L R   1st Dorsal Comp -- - Finkelstein's +- -   Snuff Box +- - Diez - -   2nd Dorsal Comp - - S-L Shear - -   S-L Joint - - L-T Shear - -   L-T Joint - - DRUJ Sup - -   6th Dorsal Comp - - DRUJ Pro - -   Ulnar Snuff - - DRUJ Grind - -   Fovea - - TFCC - -   STT Joint - - Mid-Carp Inst - -   FCR - - P-T Grind - -   Intersection - - ECU Sublux.  - -        Nine-Hole Peg Test:  Left= __19___seconds  Right=__17___seconds        ADLs  Feeding:        []MaxA   []ModA   []Mary   [] CGA   []SBA   []Harry   [x]Independent  UE Dressing:       []MaxA   []ModA   [x]Mary   [] CGA   []SBA   []Harry   []Independent  LE Dressing:       []MaxA   []ModA   [x]Mary   [] CGA   []SBA   []Harry   []Independent  Grooming:       []MaxA   []ModA   [x]Mary   [] CGA   []SBA   []Harry   []Independent  Toileting:       []MaxA   []ModA   [x]Mary   [] CGA   []SBA   []Harry []Independent  Bathing:       []MaxA   []ModA   [x]Mary   [] CGA   []SBA   []Harry   []Independent  Light Meal Prep:    []MaxA   [x]ModA   []Mary   [] CGA   []SBA   []Harry   []Independent  Household/Other: []MaxA   []ModA   []Mary   [] CGA   []SBA   []Harry   [x]Independent  Adaptive Equip:     []MaxA   []ModA   []Mary   [] CGA   []SBA   []Harry   []Independent  Driving:       []MaxA   []ModA   []Mary   [] CGA   []SBA   []Harry   [x]Independent      Pain Level (0-10 scale) post treatment: 7/10    ASSESSMENT/Changes in Function:      [x]  See Plan of Care  []  See progress note/recertification  []  See Discharge Summary             PLAN  []  Upgrade activities as tolerated     []  Continue plan of care  []  Update interventions per flow sheet       []  Discharge due to:_  []  Other:_      JULIAN Mcintyre/L 4/4/2022  3:07 PM

## 2022-04-19 ENCOUNTER — TELEPHONE (OUTPATIENT)
Dept: PHYSICAL THERAPY | Age: 42
End: 2022-04-19

## 2022-04-22 ENCOUNTER — HOSPITAL ENCOUNTER (OUTPATIENT)
Dept: PHYSICAL THERAPY | Age: 42
Discharge: HOME | End: 2022-04-22
Payer: MEDICAID

## 2022-04-22 PROCEDURE — 97535 SELF CARE MNGMENT TRAINING: CPT

## 2022-04-22 PROCEDURE — 97530 THERAPEUTIC ACTIVITIES: CPT

## 2022-04-22 PROCEDURE — 97110 THERAPEUTIC EXERCISES: CPT

## 2022-04-22 NOTE — PROGRESS NOTES
OCCUPATIONAL THERAPY - DAILY TREATMENT NOTE    Patient Name: Arpit Choi        Date: 2022  : 1980   YES Patient  Verified  Visit #:   2   of   8  Insurance: Payor: Augusto Alvarez / Plan: [id number] [place] [address] / Product Type: Managed Care Medicaid /      In time: 12:09 Out time: 12:46   Total Treatment Time: 39       TREATMENT AREA = Left hand/wrist     SUBJECTIVE    Pain Level (on 0 to 10 scale):  6   10   Medication Changes/New allergies or changes in medical history, any new surgeries or procedures? NO    If yes, update Summary List   Subjective Functional Status/Changes:  []  No changes reported     \"I have been doing some yoga and it has been painful. \"     Pt called and stated she would be arriving late to appointment. OBJECTIVE    Modalities Rationale:     decrease inflammation and decrease pain to improve patient's ability to  and pinch for ADL participation.      min [] Estim, type/location:                                      []  att     []  unatt     []  w/US     []  w/ice    []  w/heat    min []  Ultrasound, settings/location:      min []  Iontophoresis w/ dexamethasone, location:                                               []  take home patch       []  in clinic   10 min []  Ice     [x]  Heat    location/position: Left hand/wrist     min []  Paraffin:  location     min []  Vasopneumatic Device, press/temp:     min []  Other:    [x] Skin assessment post-treatment (if applicable):    [x]  intact    []  redness- no adverse reaction     []redness  adverse reaction:        13 min Therapeutic Exercise:  [x]  See flow sheet   Rationale:      increase ROM and increase strength to improve the patients ability to  and pinch for ADL participation.     -see flow sheet   -wrist AROM (flexion/extension) x10   -thumb AROM x10  -three point and tip pinch 2# clip with sponges     8 min Therapeutic Activity:    Rationale:    increase ROM and increase strength to improve the patients ability to  and pinch for ADL participation. -1/4\" pegs translation palm to digit x30      8 min Self Care/Home Management:    Rationale:     Increase ROM, strength, and activity tolerance for ADL participation.     -towel folds   -dycem practice opening jars     min Patient Education:  YES  Reviewed HEP   []  Progressed/Changed HEP based on:   Reviewed wrist and thumb AROM HEP   Pt ed dycem   Pt ed heat modalities safety and use before HEP     Other Objective/Functional Measures:    -Min verbal and visual cues for thumb and wrist AROM exercises   -Min A with AAROM towel folds   -Mod A tip and three point pinch on 2# clip for retrieving sponges   -Mod A translation 1/4 pegs palm to digit      Post Treatment Pain Level (on 0 to 10) scale:   2  / 10     ASSESSMENT  Assessment/Changes in Function:     Pain, weakness, and decreased ROM continue to affect participation in ADLs/IADLs. Decreased pain post tx session      []  See Progress Note/Recertification   Patient will continue to benefit from skilled OT services to modify and progress therapeutic interventions, address ROM deficits, address strength deficits, analyze and modify body mechanics/ergonomics and instruct in home and community integration to attain remaining goals. Progress toward goals / Updated goals:    1. Patient will be independent in home exercise program for wrist and thumb ROM. 4/22/22: Has been performing yoga exercises at home but reports pain     2. Patient will report pain in left hand reduced to 4/10 with use of modalities in clinic. 3.  patient will be familiar with task modifications to reduce pain in kitchen and dressing tasks.     Long Term Goals: To be accomplished in 4 weeks:          1. Patient will report pain diminished to 0-2/10 with routine use  2. Patient will increase thumb MILLER at least 25 degrees in flexion for movement of items across palm.   3.  Patient will increase  strength in left hand by 15pounds to increase ease of opening jars  4. Patient will improve  resistive pinches with left hand without pain  At least 4# for opening packages. PLAN  []  Upgrade activities as tolerated YES Continue plan of care   []  Discharge due to :    []  Other:      Therapist: JULIAN Amin/ALTON    Date: 4/22/2022 Time: 12:13 PM   No future appointments.

## 2022-04-26 ENCOUNTER — HOSPITAL ENCOUNTER (OUTPATIENT)
Dept: PHYSICAL THERAPY | Age: 42
Discharge: HOME | End: 2022-04-26
Payer: MEDICAID

## 2022-04-26 PROCEDURE — 97110 THERAPEUTIC EXERCISES: CPT

## 2022-04-26 PROCEDURE — 97530 THERAPEUTIC ACTIVITIES: CPT

## 2022-04-26 PROCEDURE — 97018 PARAFFIN BATH THERAPY: CPT

## 2022-04-26 NOTE — PROGRESS NOTES
OT DAILY TREATMENT NOTE 10-18    Patient Name: Yanira Torres  Date:2022  : 1980  [x]  Patient  Verified  Payor: BLUE CROSS MEDICAID / Plan: [facility] [employer] / Product Type: Managed Care Medicaid /    In time:340  Out time:425  Total Treatment Time (min): 45  Visit #: 3 of 8    Medicare/BCBS Only   Total Timed Codes (min): 35 1:1 Treatment Time:  45     Treatment Area: Left hand pain [M79.642]    SUBJECTIVE  Pain Level (0-10 scale): 2/10  Any medication changes, allergies to medications, adverse drug reactions, diagnosis change, or new procedure performed?: [x] No    [] Yes (see summary sheet for update)  Subjective functional status/changes:   [] No changes reported  No pain in my hand, today is a good day. Having difficulties pulling up my pants and kids pants. OBJECTIVE    Modality rationale: decrease pain to improve the patients ability to  without pain.      Min Type Additional Details    [] Estim:  []Unatt       []IFC  []Premod                        []Other:  []w/ice   []w/heat  Position:  Location:    [] Estim: []Att    []TENS instruct  []NMES                    []Other:  []w/US   []w/ice   []w/heat  Position:  Location:    []  Traction: [] Cervical       []Lumbar                       [] Prone          []Supine                       []Intermittent   []Continuous Lbs:  [] before manual  [] after manual    []  Ultrasound: []Continuous   [] Pulsed                           []1MHz   []3MHz W/cm2:  Location:    []  Iontophoresis with dexamethasone         Location: [] Take home patch   [] In clinic   10 []  Ice     []  heat  []  Ice massage  []  Laser   [x]  Paraffin Position:  Location: left hand    []  Laser with stim  []  Other:  Position:  Location:    []  Vasopneumatic Device Pressure:       [] lo [] med [] hi   Temperature: [] lo [] med [] hi     [x] Skin assessment post-treatment:  [x]intact []redness- no adverse reaction    []redness - adverse reaction: 17 min Therapeutic Exercise:  [] See flow sheet :   Rationale: increase ROM and increase strength to improve the patients ability to     Left wrist flex and ext 10x each  Left hand reciprocal opposition 10x   Left hand radial Abd 10x  Left hand Yanes Abd 10x    10 min Therapeutic Activity:  []  See flow sheet :   Rationale: increase ROM  to improve the patients ability to  and pinch    Left thumb Abd to locate sponges 30x  Left hand 1/4 peg palm to digit translation 40x    8 min Self Care/Home Management: Joint protection for arthritis    Rationale: increase ROM  to improve the patients ability to perform ADLs without pain. Education on joint protection for ADLs     With   [] TE   [] TA   [] neuro   [] other: Patient Education: [x] Review HEP    [] Progressed/Changed HEP based on:   [] positioning   [] body mechanics   [] transfers   [] heat/ice application   [] Splint wear/care   [] Sensory re-education   [] scar management      [] other:            Other Objective/Functional Measures:   Required verbal cue to slow pace     Pain Level (0-10 scale) post treatment: 0/10    ASSESSMENT/Changes in Function:   Tolerated all activities with no pain or fatigue noted. Pain reduced by end of session. Patient will continue to benefit from skilled OT services to modify and progress therapeutic interventions, address ROM deficits, address strength deficits, analyze and modify body mechanics/ergonomics and instruct in home and community integration to attain remaining goals. [x]  See Plan of Care  []  See progress note/recertification  []  See Discharge Summary         Progress towards goals / Updated goals:  1. Patient will be independent in home exercise program for wrist and thumb ROM. 4/22/22: Has been performing yoga exercises at home but reports pain      2. Patient will report pain in left hand reduced to 4/10 with use of modalities in clinic.   Current Status (4/26/2022): by end of session pt reported 0/10     3. patient will be familiar with task modifications to reduce pain in kitchen and dressing tasks. Long Term Goals: To be accomplished in 4 weeks:          1. Patient will report pain diminished to 0-2/10 with routine use  Current Status (4/26/2022): reported yoga has decreased pain in left thumb/ wrist.    2.  Patient will increase thumb MILLER at least 25 degrees in flexion for movement of items across palm. 3.  Patient will increase  strength in left hand by 15pounds to increase ease of opening jars  Current Status (4/26/2022):education on joint protection to open jars. 4.  Patient will improve  resistive pinches with left hand without pain  At least 4# for opening packages. PLAN  []  Upgrade activities as tolerated     [x]  Continue plan of care  []  Update interventions per flow sheet       []  Discharge due to:_  []  Other:_      KALLIE Daniels  4/26/2022  9:34 AM  I was present during the entire treatment, directing and participating in the treatment. Juan Vigil.  Moiz Rose        Future Appointments   Date Time Provider Diogo Katz   4/26/2022  3:45 PM Nonda Abdi OXLWNGT [facility]   4/28/2022  6:00 PM JULIAN Ragsdale/ALTON [id number] [facility]

## 2022-04-28 ENCOUNTER — HOSPITAL ENCOUNTER (OUTPATIENT)
Dept: PHYSICAL THERAPY | Age: 42
End: 2022-04-28
Payer: MEDICAID

## 2022-06-03 ENCOUNTER — TRANSCRIBE ORDER (OUTPATIENT)
Dept: SCHEDULING | Age: 42
End: 2022-06-03

## 2022-06-03 DIAGNOSIS — N60.09 BREAST CYST: Primary | ICD-10-CM

## 2022-06-08 ENCOUNTER — TRANSCRIBE ORDER (OUTPATIENT)
Dept: REGISTRATION | Age: 42
End: 2022-06-08

## 2022-06-08 ENCOUNTER — HOSPITAL ENCOUNTER (OUTPATIENT)
Dept: GENERAL RADIOLOGY | Age: 42
Discharge: HOME | End: 2022-06-08
Payer: MEDICAID

## 2022-06-08 DIAGNOSIS — M54.9 DORSALGIA: ICD-10-CM

## 2022-06-08 DIAGNOSIS — M25.551 RIGHT HIP PAIN: ICD-10-CM

## 2022-06-08 DIAGNOSIS — M54.9 DORSALGIA: Primary | ICD-10-CM

## 2022-06-08 PROCEDURE — 72100 X-RAY EXAM L-S SPINE 2/3 VWS: CPT

## 2022-06-08 PROCEDURE — 73502 X-RAY EXAM HIP UNI 2-3 VIEWS: CPT

## 2022-06-17 NOTE — PROGRESS NOTES
[facility] [place] [facility]  [address] [facility]  [phone] fax    Occupational Therapy Discharge Summary    Patient name: Michelle Ramires Start of Care: 22   Referral source: Alona Carreno MD : 1980   Medical/Treatment Diagnosis: Left hand pain [M79.642]  Payor: BLUE CROSS MEDICAID / Plan: [address] [place] [address] / Product Type: Managed Care Medicaid /  Onset Date:Increasing over several months      Prior Hospitalization: see medical history Provider#: [id number]   Medications: Verified on Patient Summary List    Comorbidities: HTN, Arthritis   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 Patient has HEP. 1.  Patient will be independent in home exercise program for wrist and thumb ROM. Status at Eval: Not initiated at Eval  Status at Discharge: not met     2.  Patient will report pain in left hand reduced to 4/10 with use of modalities in clinic. Status at Eval: 7/10  Status at Discharge: Goal Met, 0-2/10     3.  patient will be familiar with task modifications to reduce pain in kitchen and dressing tasks.   Status at Eval: Not initiated at Eval  Status at Discharge: Goal Met        [address] be accomplished in 4 weeks:          1.  Patient will report pain diminished to 0-2/10 with routine use  Status at Eval: 7/10  Status at Discharge: Goal Met, 0-2/10     2.  Patient will increase thumb MILLER at least 25 degrees in flexion for movement of items across palm  Status at Eval:  115  Status at Discharge: Not Met, unable to perform reassessment due to unplanned discharge as patient did not return to therapy     3.  Patient will increase  strength in left hand by 15pounds to increase ease of opening jars  Status at Eval: 40  Status at Discharge:  Not Met, unable to perform reassessment due to unplanned discharge as patient did not return to therapy     4.  Patient will improve  resistive pinches with left hand without pain  At least 4# for opening packages.    Status at Eval: 3 pt 8p!, Lateral 10p!, Tip 6p! Status at Discharge:  Not Met, unable to perform reassessment due to unplanned discharge as patient did not return to therapy      ASSESSMENT/Changes in Function: Patient only seen for 3 session in skilled outpatient occupational therapy. Patient has wrist and thumb HEP and has been introduced to joint protection and adaptive strategies and modifications to reduce pain. Patient had significant decrease in pain from initial evaluation to last session, dropping from a 7/10 to 0/10. ASSESSMENT/RECOMMENDATIONS:  [x]Discontinue therapy: []Patient has reached or is progressing toward set goals      [x]Patient is non-compliant or has abdicated      []Due to lack of appreciable progress towards set goals    DIANNE Hudson  6/17/2022 10:12 AM    NOTE TO PHYSICIAN:  Please complete the following and fax to: [facility] at [facility] at [phone]  . Retain this original for your records. If you are unable to process this request in   24 hours, please contact our office.      [] I have read the above report and request that my patient continue therapy with the following changes/special instructions:  [] I have read the above report and request that my patient be discharged from therapy    Physician's Signature:____________Date:_________TIME:________     Yuly Bianchi MD  ** Signature, Date and Time must be completed for valid certification **

## 2022-06-29 ENCOUNTER — TRANSCRIBE ORDER (OUTPATIENT)
Dept: SCHEDULING | Age: 42
End: 2022-06-29

## 2022-06-29 DIAGNOSIS — N60.09 SOLITARY CYST OF BREAST: Primary | ICD-10-CM

## 2022-07-26 ENCOUNTER — APPOINTMENT (OUTPATIENT)
Dept: NUTRITION | Age: 42
End: 2022-07-26
Payer: MEDICAID

## 2022-09-07 ENCOUNTER — HOSPITAL ENCOUNTER (OUTPATIENT)
Dept: NUTRITION | Age: 42
Discharge: HOME | End: 2022-09-07
Payer: MEDICAID

## 2022-09-07 PROCEDURE — 97802 MEDICAL NUTRITION INDIV IN: CPT

## 2022-09-08 NOTE — PROGRESS NOTES
[address] [place] [address]   Nutrition Assessment - Medical Nutrition Therapy   Outpatient Initial Evaluation         Patient Name: Katalina Smith : 1980   Treatment Diagnosis: Type 2 DM   Referral Source: Everton Davies NP Start of Care [facility] [place]): 2022     Gender: female Age: 39 y.o. Ht: 66 in Wt: 202  lb  kg   BMI: 32.6 BMR   Male  BMR Female      Past Medical History:  High Blood Pressure, Diabetes, Arthritis      Pertinent Medications:   Metformin, Amlodipine, Crestor      Biochemical Data:        Assessment:   Patient present to learn about nutrition related to weight loss and diabetes. Patient reports that she has been steadily gaining weight over the past few years. Patient admits that she struggles with portion control. 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 Patient does not exercise. Patient consumes all of her meals outside of the home. Patient does not cook. Patient is a very picky eater. Food & Nutrition: 24 hour recall:  Breakfast: skips  Lunch: skips  Dinner: [employer] chicken tona, fried ziti, [employer] brownie   Drinks: strawberry lemonade, water, juice  Snacks: sweets       Estimate Needs   Calories: 1700  Protein: 106 Carbs: 191 Fat: 57   Kcal/day  g/day  g/day  g/day        percent: 25  45  30               Nutrition Diagnosis Obesity related to excessive caloric intake as evidenced by 24 hour recall of caloric dense foods (sweets, pasta, juice, lemonade, fried foods). Nutrition Intervention &  Education: Educated patient on the need for a consistent carbohydrate intake related to diabetic control and weight loss. Educated patient on nutrition label reading, emphasizing carbohydrates and serving size. Educated patient on protein sources, encouraged patient to incorporate mostly lean protein source with all carbohydrates. Encouraged patient to exercise after meals when possible.       Handouts Provided: [x] Carbohydrates  [x]  Protein  [x]  Non-starchy Vegatbles  []  Food Label  [x]  Meal and Snack Ideas  []  Food Journals [x]  Diabetes  []  Cholesterol  []  Sodium  []  Gen Nutr Guidelines  []  SBGM Guidelines  []  Others:   Information Reviewed with: Patient    Readiness to Change Stage: [x]  Pre-contemplative    []  Contemplative  []  Preparation               []  Action                  []  Maintenance   Potential Barriers to Learning: []  Decline in memory    []  Language barrier   []  Other:  []  Emotional                  []  Limited mobility  [x]  Lack of motivation     [] Vision, hearing or cognitive impairment   Expected Compliance: Fair      Nutritional Goal - To promote lifestyle changes to result in:    [x]  Weight loss  []  Improved diabetic control  []  Decreased cholesterol levels  []  Decreased blood pressure  []  Weight maintenance []  Preventing any interactions associated with food allergies  []  Adequate weight gain toward goal weight  []  Other:        Patient Goals:   1. Patient will consume three meals per day 4-5 hours apart. 2. Patient will include a protein at all meals and snacks. 3. Patient will drink 64 ounces of water daily.        Dietitian Signature: Kiki Blanchard RD Date: 9/8/2022   Follow-up:  Time: 11:37 AM

## 2022-10-03 ENCOUNTER — TRANSCRIBE ORDER (OUTPATIENT)
Dept: SCHEDULING | Age: 42
End: 2022-10-03

## 2022-10-03 DIAGNOSIS — Z12.31 ENCOUNTER FOR SCREENING MAMMOGRAM FOR MALIGNANT NEOPLASM OF BREAST: Primary | ICD-10-CM

## 2022-10-06 ENCOUNTER — TRANSCRIBE ORDER (OUTPATIENT)
Dept: SCHEDULING | Age: 42
End: 2022-10-06

## 2022-10-06 DIAGNOSIS — N60.09: ICD-10-CM

## 2022-10-06 DIAGNOSIS — R92.8 ABNORMAL MAMMOGRAM OF BOTH BREASTS: Primary | ICD-10-CM

## 2022-10-19 ENCOUNTER — OFFICE VISIT (OUTPATIENT)
Dept: SURGERY | Age: 42
End: 2022-10-19
Payer: MEDICAID

## 2022-10-19 VITALS
OXYGEN SATURATION: 98 % | BODY MASS INDEX: 32.47 KG/M2 | WEIGHT: 202 LBS | HEIGHT: 66 IN | TEMPERATURE: 97.4 F | RESPIRATION RATE: 16 BRPM | DIASTOLIC BLOOD PRESSURE: 87 MMHG | SYSTOLIC BLOOD PRESSURE: 144 MMHG

## 2022-10-19 DIAGNOSIS — Z71.3 ENCOUNTER FOR DIETARY COUNSELING AND SURVEILLANCE: ICD-10-CM

## 2022-10-19 DIAGNOSIS — Z71.3 ENCOUNTER FOR WEIGHT LOSS COUNSELING: ICD-10-CM

## 2022-10-19 DIAGNOSIS — E66.9 OBESITY (BMI 30-39.9): Primary | ICD-10-CM

## 2022-10-19 PROCEDURE — 99204 OFFICE O/P NEW MOD 45 MIN: CPT | Performed by: NURSE PRACTITIONER

## 2022-10-20 ENCOUNTER — DOCUMENTATION ONLY (OUTPATIENT)
Dept: SURGERY | Age: 42
End: 2022-10-20

## 2022-10-20 ENCOUNTER — TELEPHONE (OUTPATIENT)
Dept: SURGERY | Age: 42
End: 2022-10-20

## 2022-10-20 NOTE — TELEPHONE ENCOUNTER
Patient called and stated that after going home and thinking about it further she has decided that the VLCD would be more appropriate for her. She states that she works 48hrs over the weekend and that her co-workers go out to dinner every Saturdays. She feels that already having a shake/soup available would help her to stay on task. Patient purchased 5 boxes of meals on her visit yesterday which is likely not going to be enough to until our next appointment so advised patient that she can come in for a one time emergency purchase of additional boxes until our next appointment.

## 2022-10-25 ENCOUNTER — HOSPITAL ENCOUNTER (OUTPATIENT)
Dept: MAMMOGRAPHY | Age: 42
Discharge: HOME | End: 2022-10-25
Attending: NURSE PRACTITIONER
Payer: MEDICAID

## 2022-10-25 ENCOUNTER — HOSPITAL ENCOUNTER (OUTPATIENT)
Dept: ULTRASOUND IMAGING | Age: 42
Discharge: HOME | End: 2022-10-25
Attending: NURSE PRACTITIONER
Payer: MEDICAID

## 2022-10-25 DIAGNOSIS — N60.09: ICD-10-CM

## 2022-10-25 DIAGNOSIS — R92.8 ABNORMAL MAMMOGRAM OF BOTH BREASTS: ICD-10-CM

## 2022-10-25 PROCEDURE — 77062 BREAST TOMOSYNTHESIS BI: CPT

## 2022-10-25 PROCEDURE — 76642 ULTRASOUND BREAST LIMITED: CPT

## 2022-10-31 ENCOUNTER — DOCUMENTATION ONLY (OUTPATIENT)
Dept: SURGERY | Age: 42
End: 2022-10-31

## 2022-10-31 NOTE — PROGRESS NOTES
Patient called asking if she could come to the office every week and purchase her meal replacements as she gets paid weekly. Advised patient per [facility] she can purchase 8 boxes every two weeks. Other than that, she is to wait until her appointments to purchase more boxes. Patient understood.

## 2022-12-06 ENCOUNTER — TELEPHONE (OUTPATIENT)
Dept: SURGERY | Age: 42
End: 2022-12-06

## 2022-12-19 ENCOUNTER — OFFICE VISIT (OUTPATIENT)
Dept: SURGERY | Age: 42
End: 2022-12-19
Payer: MEDICAID

## 2022-12-19 ENCOUNTER — HOSPITAL ENCOUNTER (OUTPATIENT)
Dept: LAB | Age: 42
Discharge: HOME | End: 2022-12-19
Payer: MEDICAID

## 2022-12-19 VITALS
TEMPERATURE: 97.5 F | RESPIRATION RATE: 17 BRPM | SYSTOLIC BLOOD PRESSURE: 139 MMHG | DIASTOLIC BLOOD PRESSURE: 75 MMHG | HEIGHT: 66 IN | OXYGEN SATURATION: 98 % | BODY MASS INDEX: 31.18 KG/M2 | HEART RATE: 66 BPM | WEIGHT: 194 LBS

## 2022-12-19 DIAGNOSIS — E66.9 OBESITY (BMI 30-39.9): Primary | ICD-10-CM

## 2022-12-19 DIAGNOSIS — E66.9 OBESITY (BMI 30-39.9): ICD-10-CM

## 2022-12-19 DIAGNOSIS — Z72.4 INAPPROPRIATE DIET OR EATING HABITS: ICD-10-CM

## 2022-12-19 DIAGNOSIS — Z71.3 ENCOUNTER FOR DIETARY COUNSELING AND SURVEILLANCE: ICD-10-CM

## 2022-12-19 LAB
ATRIAL RATE: 59 BPM
CALCULATED P AXIS, ECG09: 43 DEGREES
CALCULATED R AXIS, ECG10: -4 DEGREES
CALCULATED T AXIS, ECG11: 19 DEGREES
DIAGNOSIS, 93000: NORMAL
P-R INTERVAL, ECG05: 160 MS
Q-T INTERVAL, ECG07: 434 MS
QRS DURATION, ECG06: 74 MS
QTC CALCULATION (BEZET), ECG08: 429 MS
VENTRICULAR RATE, ECG03: 59 BPM

## 2022-12-19 PROCEDURE — 99203 OFFICE O/P NEW LOW 30 MIN: CPT | Performed by: NURSE PRACTITIONER

## 2022-12-19 PROCEDURE — 93005 ELECTROCARDIOGRAM TRACING: CPT

## 2022-12-19 NOTE — PROGRESS NOTES
[facility] Progress Note:   F/up Provider Visit    Chief Complaint   Patient presents with    Follow-up     Medical Weight Loss - LCD        Yevgeniy Gonzalez is a 43 y.o. female who is here for her  f/up medical weight loss for the LCD Program. Patient has done fairly well thus far she is down 8lbs and 9.5in overall.    -8 lbs lost.  Arm Circumference: 11in  Waist Circumference: 35in  Thigh Circumference: 18in  Total Body Fat: 36.7%  Progress and challenges thus far. Patient has done fairly well in the program so far. She did encounter some difficulties in that the apple shake caused her severe gastrointestinal discomfort, in addition she lost her grandmother and reverted to eating food for comfort. Advised patient that this is typically a normal response to grief, but to not self sabotage. Diet? LCD    Did you have any problems adhering to the program last week? Yes. If yes, please explain: Patient states the meal replacements gave her severe gas - possibly apple pie shake, per pt. Since your last visit, have you experienced any complications? Yes, the holiday foods and severe gas from some meal replacements. Patient recently has lost her grandmother and for the last 2 weeks, has not adhered to the program at all with being out of town for her grandmother's  and stress. Have you received any other medical care this week? No.   If yes, where and for what? Have you had any change in your medications since your last visit? No.  If yes what? Are you taking an appetite suppressant? No.  If yes:  Do you need a refill? BP Readings from Last 3 Encounters:   22 139/75   10/19/22 (!) 144/87   21 131/77        Eating Habits Over Last Week:  Did you take in 64 oz of non-caloric fluids? No.    Did you consume your prescribed meal replacement regimen each day? Patient states she has not had any of the meal replacements 2 weeks.       Physical Activity Over the Past Week:    Aerobic exercise: 0 min  Resistance exercise: Pt reports walking around for 30 mins x3 days but admits to not adhering to her exercise regimen within the last 2 weeks. Weight History  Weight Metrics 12/19/2022 10/19/2022 11/16/2021 7/30/2021 3/23/2021 2/26/2021 10/23/2020   Weight 194 lb 202 lb 200 lb 204 lb 210 lb 200 lb 200 lb   BMI 31.31 kg/m2 32.6 kg/m2 32.28 kg/m2 32.93 kg/m2 33.89 kg/m2 32.28 kg/m2 32.28 kg/m2       Starting wt: 202lb  Goal wt: 150lbs  EKG due: Yes, order placed  Ideal body weight: 130 lb 11.7 oz (59.3 kg)  Adjusted ideal body weight: 156 lb 0.6 oz (70.8 kg)  Body mass index is 31.31 kg/m². History    Past Medical History:   Diagnosis Date    Anemia     Diabetes ([address]     Pt. states pre-diabetic    Female bladder prolapse     PCOS (polycystic ovarian syndrome)     Thyroid disease     Pt. states they are watching her levels. Unspecified urinary incontinence     UTI (urinary tract infection)        Past Surgical History:   Procedure Laterality Date    COLONOSCOPY N/A 9/1/2017    COLONOSCOPY, DIAGNOSTIC /c Bx performed by Delbert Goodpasture, MD at [address]      Pt with 2 previous abortions, and multiple surgeries to release adhesions       Current Outpatient Medications   Medication Sig Dispense Refill    sertraline (ZOLOFT) 50 mg tablet Take 25 mg by mouth daily. (Patient not taking: Reported on 10/19/2022)      diclofenac (VOLTAREN) 1 % gel Apply 2 g to affected area four (4) times daily. Apply 2 grams to affected area 4x a day 200 g 2    amLODIPine (NORVASC) 5 mg tablet Take 5 mg by mouth daily. rosuvastatin (CRESTOR) 20 mg tablet Take 20 mg by mouth nightly. metFORMIN ER (GLUCOPHAGE XR) 500 mg tablet TAKE 1 TABLET BY MOUTH EVERY DAY      cholecalciferol (VITAMIN D3) (5000 Units /125 mcg) capsule Take  by mouth.  (Patient not taking: Reported on 10/19/2022)      ergocalciferol (ERGOCALCIFEROL) 1,250 mcg (50,000 unit) capsule ergocalciferol (vitamin D2) 1,250 mcg (50,000 unit) capsule   TAKE ONE CAPSULE BY MOUTH EVERY WEEK      ibuprofen (MOTRIN) 600 mg tablet Take 1 Tab by mouth every six (6) hours as needed for Pain. 30 Tab 1       Allergies   Allergen Reactions    Rosuvastatin Other (comments)     ANXIETY       Social History     Tobacco Use    Smoking status: Never    Smokeless tobacco: Never   Substance Use Topics    Alcohol use: Not Currently    Drug use: No       Family History   Problem Relation Age of Onset    Hypertension Father     Heart Disease Father     Hypertension Paternal Grandfather     Hypertension Mother     Hypertension Brother     Hypertension Maternal Aunt     Hypertension Paternal Aunt     Cancer Cousin     Diabetes Neg Hx        Family Status   Relation Name Status    Other  Other        Asthma;Cancer; Thyroid Dysfunction    Father      PGF  (Not Specified)    Mother  Alive    Brother  (Not Specified)    MAunt  (Not Specified)    PAunt  (Not Specified)    Cousin  (Not Specified)    Neg Hx  (Not Specified)         Review of Systems  Review of Systems   Constitutional:  Positive for weight loss. HENT: Negative. Eyes: Negative. Respiratory: Negative. Cardiovascular: Negative. Gastrointestinal: Negative. Genitourinary: Negative. Musculoskeletal: Negative. Skin: Negative. Neurological: Negative. Endo/Heme/Allergies: Negative. Psychiatric/Behavioral: Negative. Objective  Visit Vitals  /75 (BP Patient Position: Sitting)   Pulse 66   Temp 97.5 °F (36.4 °C) (Temporal)   Resp 17   Ht 5' 6\" (1.676 m)   Wt 194 lb (88 kg)   SpO2 98%   BMI 31.31 kg/m²     No LMP recorded. Physical Exam  Physical Exam  Constitutional:       Appearance: She is obese. HENT:      Head: Normocephalic and atraumatic. Mouth/Throat:      Mouth: Mucous membranes are moist.   Eyes:      Extraocular Movements: Extraocular movements intact.       Pupils: Pupils are equal, round, and reactive to light. Cardiovascular:      Rate and Rhythm: Normal rate and regular rhythm. Pulses: Normal pulses. Heart sounds: Normal heart sounds. Pulmonary:      Effort: Pulmonary effort is normal.      Breath sounds: Normal breath sounds. Abdominal:      Palpations: Abdomen is soft. Musculoskeletal:         General: Normal range of motion. Cervical back: Normal range of motion and neck supple. Skin:     General: Skin is warm and dry. Neurological:      General: No focal deficit present. Mental Status: She is alert and oriented to person, place, and time. Psychiatric:         Mood and Affect: Mood normal.         Behavior: Behavior normal.         No results found for this or any previous visit (from the past 672 hour(s)). Assessment / Plan    No diagnosis found. 1.  Weight management      Today, the patient is  Height: 5' 6\" (167.6 cm) tall, Weight: 194 lb (88 kg) lbs for a Body mass index is 31.31 kg/m². is suffering from obesity  which is further complicated by hypertension and hyperlipidemia. Degree of control: Good   Progress was reviewed with patient. Goal(s) for next appointment:   -Back on track with the meal replacements, water intake and exercise regimen. I have reviewed/discussed the above normal BMI with the patient. I have recommended the following interventions: dietary management education, guidance, and counseling, encourage exercise, monitor weight, and prescribed dietary intake . Gonzalo Horn 2.  Labs    EKG prior to starting phentermine    There were no encounter diagnoses.               EVANGELIST Robb

## 2023-02-16 ENCOUNTER — TELEPHONE (OUTPATIENT)
Age: 43
End: 2023-02-16

## 2023-05-22 ENCOUNTER — TRANSCRIBE ORDERS (OUTPATIENT)
Facility: HOSPITAL | Age: 43
End: 2023-05-22

## 2023-05-22 DIAGNOSIS — E04.1 NONTOXIC SINGLE THYROID NODULE: Primary | ICD-10-CM

## 2023-05-24 ENCOUNTER — OFFICE VISIT (OUTPATIENT)
Age: 43
End: 2023-05-24
Payer: MEDICAID

## 2023-05-24 VITALS
TEMPERATURE: 98.3 F | OXYGEN SATURATION: 98 % | WEIGHT: 198 LBS | BODY MASS INDEX: 31.82 KG/M2 | HEART RATE: 57 BPM | SYSTOLIC BLOOD PRESSURE: 120 MMHG | DIASTOLIC BLOOD PRESSURE: 80 MMHG | HEIGHT: 66 IN | RESPIRATION RATE: 18 BRPM

## 2023-05-24 DIAGNOSIS — D17.24 BENIGN LIPOMATOUS NEOPLASM OF SKIN AND SUBCUTANEOUS TISSUE OF LEFT LEG: Primary | ICD-10-CM

## 2023-05-24 PROCEDURE — 99202 OFFICE O/P NEW SF 15 MIN: CPT | Performed by: SURGERY

## 2023-05-24 ASSESSMENT — ENCOUNTER SYMPTOMS
CHEST TIGHTNESS: 0
SHORTNESS OF BREATH: 0

## 2023-05-24 NOTE — PROGRESS NOTES
Geovanna Delgado is a 43 y.o. female  Chief Complaint   Patient presents with    New Patient     Lipoma on leg. Well nourished/Average build Well nourished/Average build Well nourished/Average build Well nourished Well nourished/Average build Well nourished/Average build Well nourished/Average build Average build/Well nourished Well nourished/Average build Well nourished Average build/Well nourished Average build/Well nourished Average build/Well nourished Well nourished/Average build Well nourished/Average build Well nourished/Average build Average build/Well nourished Well nourished Well nourished/Average build Average build/Well nourished Well nourished/Average build Well nourished Well nourished/Average build Average build/Well nourished Average build/Well nourished Well nourished/Average build Well nourished Well nourished/Average build Well nourished/Average build

## 2023-05-24 NOTE — PROGRESS NOTES
CC:   Chief Complaint   Patient presents with    New Patient     Lipoma on leg. Assessment:    ICD-10-CM    1. 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 Unclear if this was a possible cyst.  She currently has nothing on physical exam and states she has no discomfort at this time. I am always happy to see her in the future as needed. She was asked to call if she has any questions or concerns. She agrees with this plan. HPI:  Jeanna Eaton is a 43 y.o. female who is referred by HUNG Keane for lipoma of the left lower leg. She denies any trauma to her left lower extremity. She reports to lumps on the anterior shin that were present for approximately 1 month and that went away completely. She reports no drainage to her skin or similar problems in the past.  She does have occasional left calf pain with activity but this is unchanged. Allergies:   Allergies   Allergen Reactions    Rosuvastatin Other (See Comments)     ANXIETY       Medication Review:  Current Outpatient Medications on File Prior to Visit   Medication Sig Dispense Refill    amLODIPine (NORVASC) 5 MG tablet Take 1 tablet by mouth daily      ergocalciferol (ERGOCALCIFEROL) 1.25 MG (91095 UT) capsule ergocalciferol (vitamin D2) 1,250 mcg (50,000 unit) capsule   TAKE ONE CAPSULE BY MOUTH EVERY WEEK      ibuprofen (ADVIL;MOTRIN) 600 MG tablet Take 1 tablet by mouth every 6 hours as needed      vitamin D (CHOLECALCIFEROL) 125 MCG (5000 UT) CAPS capsule Take by mouth (Patient not taking: Reported on 5/24/2023)      diclofenac sodium (VOLTAREN) 1 % GEL Apply 2 g topically 4 times daily (Patient not taking: Reported on 5/24/2023)      metFORMIN (GLUCOPHAGE-XR) 500 MG extended release tablet TAKE 1 TABLET BY MOUTH EVERY DAY (Patient not taking: Reported on 5/24/2023)      rosuvastatin (CRESTOR) 20 MG tablet

## 2023-09-01 ENCOUNTER — TRANSCRIBE ORDERS (OUTPATIENT)
Facility: HOSPITAL | Age: 43
End: 2023-09-01

## 2023-09-01 DIAGNOSIS — N63.20 MASS OF LEFT BREAST, UNSPECIFIED QUADRANT: Primary | ICD-10-CM

## 2023-09-01 DIAGNOSIS — Z87.898 HISTORY OF ABNORMAL MAMMOGRAM: ICD-10-CM

## 2023-09-05 ENCOUNTER — HOSPITAL ENCOUNTER (OUTPATIENT)
Facility: HOSPITAL | Age: 43
Discharge: HOME | End: 2023-09-08
Payer: MEDICAID

## 2023-09-05 ENCOUNTER — HOSPITAL ENCOUNTER (OUTPATIENT)
Dept: WOMENS IMAGING | Facility: HOSPITAL | Age: 43
Discharge: HOME | End: 2023-09-08
Payer: MEDICAID

## 2023-09-05 DIAGNOSIS — Z87.898 HISTORY OF ABNORMAL MAMMOGRAM: ICD-10-CM

## 2023-09-05 DIAGNOSIS — N63.20 MASS OF LEFT BREAST, UNSPECIFIED QUADRANT: ICD-10-CM

## 2023-09-05 PROCEDURE — G0279 TOMOSYNTHESIS, MAMMO: HCPCS

## 2023-09-05 PROCEDURE — 76642 ULTRASOUND BREAST LIMITED: CPT

## 2023-10-02 ENCOUNTER — HOSPITAL ENCOUNTER (OUTPATIENT)
Facility: HOSPITAL | Age: 43
Discharge: HOME | End: 2023-10-05
Payer: MEDICAID

## 2023-10-02 DIAGNOSIS — M25.572 LEFT ANKLE PAIN, UNSPECIFIED CHRONICITY: ICD-10-CM

## 2023-10-02 DIAGNOSIS — M25.511 RIGHT SHOULDER PAIN, UNSPECIFIED CHRONICITY: ICD-10-CM

## 2023-10-02 DIAGNOSIS — M25.512 LEFT SHOULDER PAIN, UNSPECIFIED CHRONICITY: ICD-10-CM

## 2023-10-02 PROCEDURE — 72050 X-RAY EXAM NECK SPINE 4/5VWS: CPT

## 2023-10-02 PROCEDURE — 73610 X-RAY EXAM OF ANKLE: CPT

## 2023-10-02 PROCEDURE — 73030 X-RAY EXAM OF SHOULDER: CPT

## 2023-10-02 PROCEDURE — 73630 X-RAY EXAM OF FOOT: CPT

## 2024-02-06 NOTE — LETTER
NOTIFICATION RETURN TO WORK / SCHOOL 
 
3/23/2021 9:35 AM 
 
Ms. Robbie Stratton [place] [id number] To Whom It May Concern: 
 
Robbie Stratton is currently under the care of [id number] [place] [address] EMERGENCY DEPT. She will return to work/school on: 3/26/21 Robbie Stratton may return to work/school with the following restrictions: none. If there are questions or concerns please have the patient contact our office.  
 
 
 
Sincerely, 
 
 
 
 
 
Adriana Mtz RN  
 

10

## 2024-02-26 ENCOUNTER — OFFICE VISIT (OUTPATIENT)
Age: 44
End: 2024-02-26
Payer: MEDICAID

## 2024-02-26 VITALS
SYSTOLIC BLOOD PRESSURE: 158 MMHG | WEIGHT: 208 LBS | BODY MASS INDEX: 33.43 KG/M2 | HEART RATE: 61 BPM | OXYGEN SATURATION: 99 % | DIASTOLIC BLOOD PRESSURE: 100 MMHG | HEIGHT: 66 IN

## 2024-02-26 DIAGNOSIS — R00.2 PALPITATIONS: Primary | ICD-10-CM

## 2024-02-26 PROCEDURE — 99204 OFFICE O/P NEW MOD 45 MIN: CPT | Performed by: INTERNAL MEDICINE

## 2024-02-26 PROCEDURE — 93000 ELECTROCARDIOGRAM COMPLETE: CPT | Performed by: INTERNAL MEDICINE

## 2024-02-26 RX ORDER — AMLODIPINE BESYLATE 10 MG/1
10 TABLET ORAL DAILY
Qty: 90 TABLET | Refills: 0 | Status: SHIPPED | OUTPATIENT
Start: 2024-02-26

## 2024-02-26 RX ORDER — ROSUVASTATIN CALCIUM 5 MG/1
5 TABLET, COATED ORAL DAILY
COMMUNITY
Start: 2023-11-21

## 2024-02-26 NOTE — PROGRESS NOTES
[facility]    Vaishnavi Mckenzie is 43 y.o. female     Patient is here today for cardiac evaluation  Denies prior history of MI or CHF  Patient has on and off palpitation for several years.  Without presyncope syncope  No chest pain or chest tightness concerning for angina.  No shortness of breath at this time.  No orthopnea or PND.  She is here for second opinion.  Recently was prescribed by primary cardiologist losartan however has not started it  Denies any nausea, vomiting, abdominal pain, fever, chills, sputum production. No hematuria or other bleeding complaints    Past Medical History:   Diagnosis Date    Anemia     Diabetes (HCC)     Pt. states pre-diabetic    Female bladder prolapse     HTN (hypertension)     PCOS (polycystic ovarian syndrome)     Thyroid disease     Pt. states they are watching her levels.    Unspecified urinary incontinence     UTI (urinary tract infection)        Review of Systems:  Cardiac symptoms as noted above in HPI. All others negative.  Denies fatigue, malaise, skin rash, joint pain, blurring vision, photophobia, neck pain, hemoptysis, chronic cough, nausea, vomiting, hematuria, burning micturition, BRBPR, chronic headaches.    Current Outpatient Medications   Medication Sig    rosuvastatin (CRESTOR) 5 MG tablet Take 1 tablet by mouth daily    vitamin D (CHOLECALCIFEROL) 125 MCG (5000 UT) CAPS capsule Take by mouth    diclofenac sodium (VOLTAREN) 1 % GEL Apply 2 g topically 4 times daily    ergocalciferol (ERGOCALCIFEROL) 1.25 MG (28792 UT) capsule ergocalciferol (vitamin D2) 1,250 mcg (50,000 unit) capsule   TAKE ONE CAPSULE BY MOUTH EVERY WEEK    ibuprofen (ADVIL;MOTRIN) 600 MG tablet Take 1 tablet by mouth every 6 hours as needed    metFORMIN (GLUCOPHAGE-XR) 500 MG extended release tablet      No current facility-administered medications for this visit.       Past Surgical History:   Procedure Laterality

## 2024-04-22 ENCOUNTER — OFFICE VISIT (OUTPATIENT)
Age: 44
End: 2024-04-22
Payer: MEDICAID

## 2024-04-22 VITALS
SYSTOLIC BLOOD PRESSURE: 140 MMHG | DIASTOLIC BLOOD PRESSURE: 90 MMHG | OXYGEN SATURATION: 96 % | WEIGHT: 209 LBS | BODY MASS INDEX: 33.59 KG/M2 | HEART RATE: 68 BPM | HEIGHT: 66 IN

## 2024-04-22 DIAGNOSIS — R00.2 PALPITATIONS: Primary | ICD-10-CM

## 2024-04-22 DIAGNOSIS — I10 PRIMARY HYPERTENSION: ICD-10-CM

## 2024-04-22 PROCEDURE — 3080F DIAST BP >= 90 MM HG: CPT | Performed by: PHYSICIAN ASSISTANT

## 2024-04-22 PROCEDURE — 3077F SYST BP >= 140 MM HG: CPT | Performed by: PHYSICIAN ASSISTANT

## 2024-04-22 PROCEDURE — 99214 OFFICE O/P EST MOD 30 MIN: CPT | Performed by: PHYSICIAN ASSISTANT

## 2024-04-22 NOTE — PROGRESS NOTES
[facility]    Vaishnavi Mckenzie is a 43 y.o. female, pmhx of HTN       Presents to the office today for follow-up. Patient with history of palpitations, s/p event monitor with no significant findings.  Denies associated dizziness, near-syncope or syncope.  She does report shortness of breath which last for 1 to 2 seconds when she experiences palpitations. Denies CP, diaphoresis, N/V/D, weight gain, LE edema, orthopnea, PND, palpitations, near syncope or syncope, dizziness.    Past Medical History:   Diagnosis Date    Anemia     Diabetes (HCC)     Pt. states pre-diabetic    Female bladder prolapse     HTN (hypertension)     PCOS (polycystic ovarian syndrome)     Thyroid disease     Pt. states they are watching her levels.    Unspecified urinary incontinence     UTI (urinary tract infection)          Past Surgical History:   Procedure Laterality Date    COLONOSCOPY N/A 9/1/2017    COLONOSCOPY, DIAGNOSTIC /c Bx performed by Davon Hoyos MD at [facility] ENDOSCOPY    PELVIC LAPAROSCOPY      Pt with 2 previous abortions, and multiple surgeries to release adhesions       Current Outpatient Medications   Medication Sig    rosuvastatin (CRESTOR) 5 MG tablet Take 1 tablet by mouth daily    amLODIPine (NORVASC) 10 MG tablet Take 1 tablet by mouth daily    vitamin D (CHOLECALCIFEROL) 125 MCG (5000 UT) CAPS capsule Take by mouth    diclofenac sodium (VOLTAREN) 1 % GEL Apply 2 g topically 4 times daily    ergocalciferol (ERGOCALCIFEROL) 1.25 MG (71609 UT) capsule ergocalciferol (vitamin D2) 1,250 mcg (50,000 unit) capsule   TAKE ONE CAPSULE BY MOUTH EVERY WEEK    ibuprofen (ADVIL;MOTRIN) 600 MG tablet Take 1 tablet by mouth every 6 hours as needed    metFORMIN (GLUCOPHAGE-XR) 500 MG extended release tablet      No current facility-administered medications for this visit.       Allergies and Sensitivities:  Allergies   Allergen Reactions    Rosuvastatin Other (See Comments)     ANXIETY       Family History:  Family

## 2024-04-23 ENCOUNTER — HOSPITAL ENCOUNTER (OUTPATIENT)
Facility: HOSPITAL | Age: 44
Discharge: HOME | End: 2024-04-26
Payer: MEDICAID

## 2024-04-23 DIAGNOSIS — M25.551 HIP PAIN, RIGHT: ICD-10-CM

## 2024-04-23 PROCEDURE — 73502 X-RAY EXAM HIP UNI 2-3 VIEWS: CPT

## 2024-05-29 SDOH — HEALTH STABILITY: PHYSICAL HEALTH: ON AVERAGE, HOW MANY MINUTES DO YOU ENGAGE IN EXERCISE AT THIS LEVEL?: 30 MIN

## 2024-05-29 SDOH — HEALTH STABILITY: PHYSICAL HEALTH: ON AVERAGE, HOW MANY DAYS PER WEEK DO YOU ENGAGE IN MODERATE TO STRENUOUS EXERCISE (LIKE A BRISK WALK)?: 3 DAYS

## 2024-05-31 ENCOUNTER — OFFICE VISIT (OUTPATIENT)
Age: 44
End: 2024-05-31
Payer: MEDICAID

## 2024-05-31 VITALS — BODY MASS INDEX: 33.11 KG/M2 | HEIGHT: 66 IN | WEIGHT: 206 LBS

## 2024-05-31 DIAGNOSIS — M16.11 PRIMARY OSTEOARTHRITIS OF RIGHT HIP: Primary | ICD-10-CM

## 2024-05-31 PROCEDURE — 99204 OFFICE O/P NEW MOD 45 MIN: CPT | Performed by: ORTHOPAEDIC SURGERY

## 2024-05-31 RX ORDER — MELOXICAM 15 MG/1
TABLET ORAL
Qty: 30 TABLET | Refills: 3 | Status: SHIPPED | OUTPATIENT
Start: 2024-05-31

## 2024-05-31 NOTE — PROGRESS NOTES
(HCC)     Pt. states pre-diabetic    Female bladder prolapse     HTN (hypertension)     PCOS (polycystic ovarian syndrome)     Thyroid disease     Pt. states they are watching her levels.    Unspecified urinary incontinence     UTI (urinary tract infection)        Family History   Problem Relation Age of Onset    Heart Disease Father     Hypertension Paternal Grandfather     Hypertension Maternal Aunt     Hypertension Mother     Hypertension Brother     Hypertension Paternal Aunt     Diabetes Neg Hx     Cancer Cousin     Hypertension Father        Current Outpatient Medications   Medication Sig Dispense Refill    rosuvastatin (CRESTOR) 5 MG tablet Take 1 tablet by mouth daily      amLODIPine (NORVASC) 10 MG tablet Take 1 tablet by mouth daily 90 tablet 0    vitamin D (CHOLECALCIFEROL) 125 MCG (5000 UT) CAPS capsule Take by mouth      diclofenac sodium (VOLTAREN) 1 % GEL Apply 2 g topically 4 times daily      ergocalciferol (ERGOCALCIFEROL) 1.25 MG (64621 UT) capsule ergocalciferol (vitamin D2) 1,250 mcg (50,000 unit) capsule   TAKE ONE CAPSULE BY MOUTH EVERY WEEK      ibuprofen (ADVIL;MOTRIN) 600 MG tablet Take 1 tablet by mouth every 6 hours as needed      metFORMIN (GLUCOPHAGE-XR) 500 MG extended release tablet        No current facility-administered medications for this visit.       Allergies   Allergen Reactions    Rosuvastatin Other (See Comments)     ANXIETY       Past Surgical History:   Procedure Laterality Date    COLONOSCOPY N/A 9/1/2017    COLONOSCOPY, DIAGNOSTIC /c Bx performed by Davon Hoyos MD at [facility] ENDOSCOPY    PELVIC LAPAROSCOPY      Pt with 2 previous abortions, and multiple surgeries to release adhesions       Social History     Socioeconomic History    Marital status:      Spouse name: Not on file    Number of children: Not on file    Years of education: Not on file    Highest education level: Not on file   Occupational History    Not on file   Tobacco Use    Smoking status: Never

## 2024-06-25 ENCOUNTER — HOSPITAL ENCOUNTER (OUTPATIENT)
Facility: HOSPITAL | Age: 44
Discharge: HOME | End: 2024-06-28
Payer: MEDICAID

## 2024-06-25 DIAGNOSIS — R92.8 ABNORMAL MAMMOGRAM: ICD-10-CM

## 2024-06-25 DIAGNOSIS — Z87.898 HISTORY OF ABNORMAL MAMMOGRAM: ICD-10-CM

## 2024-06-25 PROCEDURE — 76642 ULTRASOUND BREAST LIMITED: CPT

## 2024-06-26 ENCOUNTER — HOSPITAL ENCOUNTER (OUTPATIENT)
Facility: HOSPITAL | Age: 44
Setting detail: RECURRING SERIES
End: 2024-06-26
Payer: MEDICAID

## 2024-06-26 NOTE — PROGRESS NOTES
PT DAILY TREATMENT NOTE/HIP [id number]      Patient Name: Vaishnavi Mckenzie    Date: 2024    : 1980  Insurance: Payor: [facility] MEDICAID / Plan: ELIAS [facility] HEALTHKEEPERS PLUS / Product Type: *No Product type* /      Patient  verified {YES/NO DIET:457006892}     Visit #   Current / Total *** ***   Time   In / Out *** ***   Pain   In / Out *** ***   Subjective Functional Status/Changes: ***     Treatment Area: Right hip pain [M25.551]  Left hip pain [M25.552]    SUBJECTIVE  Pain Level (0-10 scale): ***  []constant []intermittent []improving []worsening []no change since onset    Any medication changes, allergies to medications, adverse drug reactions, diagnosis change, or new procedure performed?: [x] No    [] Yes (see summary sheet for update)  Subjective functional status/changes:     PLOF: ***  Limitations to PLOF: ***  Mechanism of Injury: ***  Current symptoms/Complaints: ***  Previous Treatment/Compliance: ***  PMHx/Surgical Hx: ***  Work Hx: ***  Living Situation: ***  Pt Goals: ***  Barriers: []pain []financial []time []transportation []other  Motivation: ***  Substance use: []Alcohol []Tobacco []other:   FABQ Score: []low []elevate  Cognition: A & O x ***    Other:    OBJECTIVE/EXAMINATION  Domestic Life: ***  Activity/Recreational Limitations: ***  Mobility: ***  Self Care: ***    Modalities Rationale:     {InMotion Modality Rationale:71912} to improve patient's ability to progress to PLOF and address remaining functional goals.     min [] Estim Unattended, type/location:                                      []  w/ice    []  w/heat    min [] Estim Attended, type/location:                                     []  w/US     []  w/ice    []  w/heat    []  TENS insruct      min []  Mechanical Traction: type/lbs                   []  pro   []  sup   []  int   []  cont    []  before manual    []  after manual    min []  Ultrasound, settings/location:      min []  Iontophoresis w/

## 2024-06-28 ENCOUNTER — HOSPITAL ENCOUNTER (OUTPATIENT)
Facility: HOSPITAL | Age: 44
Setting detail: RECURRING SERIES
Discharge: HOME | End: 2024-07-01
Payer: MEDICAID

## 2024-06-28 PROCEDURE — 97161 PT EVAL LOW COMPLEX 20 MIN: CPT

## 2024-06-28 NOTE — PROGRESS NOTES
Severe    (R) Tightness= [] WNL   [] Min   [] Mod   [] Severe                                  Palpation TTP to medial to ASIS, greater trochanter   [] Min  [] Mod  [] Severe    Location:  [] Min  [] Mod  [] Severe    Location:  [] Min  [] Mod  [] Severe    Location:    Optional Tests  Blas/ Kaci Test: [] Neg    [] Pos  Byron Test:  [] Neg    [] Pos  Scouring Test: [] Neg    [] Pos  Trendelenberg:  [] Neg    [] Pos [] Left    [] Right  OberTest:   [] Neg    [] Pos  Ely's Test:  [] Neg    [] Pos  Piriformis Test:  [] Neg    [] Pos  Sub-talor alignment: [] Neurtral [] Pronation [] Supination  Forefoot alignment: [] Neutral [] Varus [] Valgus  Functional Leg Length (cm):   Right:  Left:  Discrepancy:    Other tests/ comments:    ANIKA test positive         ASSESSMENT/Changes in Function:     Patient will continue to benefit from skilled PT services to modify and progress therapeutic interventions, analyze and address functional mobility deficits, analyze and address ROM deficits, analyze and address strength deficits, analyze and address soft tissue restrictions, analyze and cue for proper movement patterns, analyze and modify for postural abnormalities, analyze and address imbalance/dizziness, and instruct in home and community integration to address functional deficits and attain remaining goals.       [x]  See Plan of Care for goals and assessment      PLAN  []  Upgrade activities as tolerated     []  Continue plan of care  []  Update interventions per flow sheet       []  Other:Chaya Berrios, PT 6/28/2024  8:52 AM

## 2024-06-28 NOTE — THERAPY EVALUATION
flex AROM to at least 90 deg in order to navigate stairs and transfer with increased ease.   Status at evaluation: 62 deg  4. Patient will improve right hip ER/IR AROM to at least 40 deg in order to perform self care and dressing tasks with increased ease.   Status at evaluation: ER 21 deg, IR 35 deg  5. Patient will improve right hip flex and abd strength to at least 4-/5 with MMT in order to perform bed mobility and ambulate with increased ease.   Status at evaluation: 2+/5; pain limiting    Frequency / Duration: Patient to be seen 2-3 times per week for 4 weeks      Patient/ Caregiver education and instruction: Diagnosis, prognosis, self care, activity modification, and exercises [x]  Plan of care has been reviewed with JANELLE Berrios, PT       6/28/2024       8:52 AM  ===================================================================  I certify that the above Therapy Services are being furnished while the patient is under my care. I agree with the treatment plan and certify that this therapy is necessary.    Physician's Signature:_________________________   DATE:_________   TIME:________                           Hossein Quigley PA-C    ** Signature, Date and Time must be completed for valid certification **  Please sign and return to [facility] or you may fax the signed copy to [phone].  Thank you.

## 2024-07-02 ENCOUNTER — HOSPITAL ENCOUNTER (OUTPATIENT)
Facility: HOSPITAL | Age: 44
Discharge: HOME | End: 2024-07-05
Attending: ORTHOPAEDIC SURGERY
Payer: MEDICAID

## 2024-07-02 DIAGNOSIS — M16.11 PRIMARY OSTEOARTHRITIS OF RIGHT HIP: ICD-10-CM

## 2024-07-02 PROCEDURE — 2580000003 HC RX 258: Performed by: ORTHOPAEDIC SURGERY

## 2024-07-02 PROCEDURE — A9577 INJ MULTIHANCE: HCPCS | Performed by: ORTHOPAEDIC SURGERY

## 2024-07-02 PROCEDURE — 77002 NEEDLE LOCALIZATION BY XRAY: CPT

## 2024-07-02 PROCEDURE — 2500000003 HC RX 250 WO HCPCS: Performed by: ORTHOPAEDIC SURGERY

## 2024-07-02 PROCEDURE — 6360000004 HC RX CONTRAST MEDICATION: Performed by: ORTHOPAEDIC SURGERY

## 2024-07-02 PROCEDURE — 73722 MRI JOINT OF LWR EXTR W/DYE: CPT

## 2024-07-02 RX ORDER — LIDOCAINE HYDROCHLORIDE 10 MG/ML
5 INJECTION, SOLUTION EPIDURAL; INFILTRATION; INTRACAUDAL; PERINEURAL ONCE
Status: COMPLETED | OUTPATIENT
Start: 2024-07-02 | End: 2024-07-02

## 2024-07-02 RX ORDER — SODIUM CHLORIDE 0.9 % (FLUSH) 0.9 %
10 SYRINGE (ML) INJECTION 2 TIMES DAILY
Status: DISCONTINUED | OUTPATIENT
Start: 2024-07-02 | End: 2024-07-06 | Stop reason: HOSPADM

## 2024-07-02 RX ORDER — IOPAMIDOL 408 MG/ML
20 INJECTION, SOLUTION INTRATHECAL
Status: COMPLETED | OUTPATIENT
Start: 2024-07-02 | End: 2024-07-02

## 2024-07-02 RX ADMIN — Medication 10 ML: at 11:58

## 2024-07-02 RX ADMIN — LIDOCAINE HYDROCHLORIDE 5 ML: 10 INJECTION, SOLUTION EPIDURAL; INFILTRATION; INTRACAUDAL; PERINEURAL at 11:58

## 2024-07-02 RX ADMIN — GADOBENATE DIMEGLUMINE 5 ML: 529 INJECTION, SOLUTION INTRAVENOUS at 12:00

## 2024-07-02 RX ADMIN — IOPAMIDOL 20 ML: 408 INJECTION, SOLUTION INTRATHECAL at 11:59

## 2024-08-08 ENCOUNTER — HOSPITAL ENCOUNTER (OUTPATIENT)
Facility: HOSPITAL | Age: 44
Setting detail: RECURRING SERIES
Discharge: HOME | End: 2024-08-11
Payer: MEDICAID

## 2024-08-08 PROCEDURE — 97110 THERAPEUTIC EXERCISES: CPT

## 2024-08-08 PROCEDURE — 97530 THERAPEUTIC ACTIVITIES: CPT

## 2024-08-08 PROCEDURE — 97112 NEUROMUSCULAR REEDUCATION: CPT

## 2024-08-08 NOTE — PROGRESS NOTES
[facility]  [address] [place] [address] - Ph: [phone]   Fx: [phone]  PHYSICAL THERAPY PROGRESS NOTE  [x] Progress Note  [] Discharge Summary    Patient Name: Vaishnavi Mckenzie : 1980   Treatment/Medical Diagnosis: Right hip pain [M25.551]   Referral Source: Hossein Quigley PA-C     Date of Initial Visit: 2024 Attended Visits: 2 Missed Visits: 0       Comorbidities: HTN  Prior Level of Function:working as ; lives in 2 story home; enjoys working out 3 days/week (nilesh)     SUMMARY OF TREATMENT  Pt attending 2 visits since their SOC for R hip pain on 2024, she has had a lapse in OPPT care d/t scheduling difficulties.  She has made fair progress since her SOC.  Since SOC, she has had an MRI of R hip with results \"tear through the right anterior/superior acetabular labrum...Mild right hamstring tendinosis \".  They report 40% improvement since this time with a 4-5/10 max pain level during sitting positions, getting in/out of the car, and bed mobility.  Lowest reported pain level is 0/10.  Functional improvements of sitting tolerance and getting in/out of their car are noted, though they remain to report/demonstrate difficulty with previous mentioned activities and sit>supine bed mobility.  They will benefit from further skilled therapy to improve functional strength, endurance, and AROM for improved sitting tolerance, car transfers for job duties, bed mobility, and return to pain free prior level of fitness to decrease cardiovascular disease risk and maintain healthy lifestyle.     CURRENT STATUS/Progress towards Goals:  Short Term Goals: To be accomplished in 3 treatments  Patient will report compliance with initial HEP to optimize therapy outcomes.  Status at evaluation:  Current PN: progressing, copy of initial HEP is provided today     Long Term Goals: To be accomplished in 4

## 2024-08-08 NOTE — PROGRESS NOTES
PHYSICAL / OCCUPATIONAL THERAPY - DAILY TREATMENT NOTE    Patient Name: Vaishnavi Mckenzie    Date: 2024    : 1980  Insurance: Payor: [facility] MEDICAID / Plan: ELIAS [facility] HEALTHKEEPERS PLUS / Product Type: *No Product type* /      Patient  verified YES     Visit #   Current / Total 2 12   Time   In / Out 1245 134   Pain   In / Out 0/10 4/10   Subjective Functional Status/Changes: Pain at best 0/10, at worst 4-5/10 sitting positions  Subjective % improvement: 40%     TREATMENT AREA =  Right hip pain [M25.551]     OBJECTIVE    Modalities Rationale:     decrease inflammation and decrease pain to improve patient's ability to progress to PLOF and address remaining functional goals.     min [] Estim Unattended, type/location:                                      []  w/ice    []  w/heat    min [] Estim Attended, type/location:                                     []  w/US     []  w/ice    []  w/heat    []  TENS insruct      min []  Mechanical Traction: type/lbs                   []  pro   []  sup   []  int   []  cont    []  before manual    []  after manual    min []  Ultrasound, settings/location:     10 min  unbill [x]  Ice     []  Heat    location/position: Lateral R hip in side lying    min []  Paraffin,  details:     min []  Vasopneumatic Device, press/temp:     min []  Whirlpool / Fluido:    If using vaso (only need to measure limb vaso being performed on)      pre-treatment girth :       post-treatment girth :       measured at (landmark location) :      min []  Other:    Skin assessment post-treatment:   Intact      Therapeutic Procedures:    Tx Min Billable or 1:1 Min (if diff from Tx Min) Procedure, Rationale, Specifics   13  [id number] Therapeutic Exercise (timed):  increase ROM, strength, coordination, balance, and proprioception to improve patient's ability to progress to PLOF and address remaining functional goals. (see flow sheet as applicable)     Details if applicable:       [phone]

## 2024-08-12 ENCOUNTER — TELEPHONE (OUTPATIENT)
Facility: HOSPITAL | Age: 44
End: 2024-08-12

## 2024-08-12 NOTE — TELEPHONE ENCOUNTER
Left vm for pt regarding continuation with therapy; advised pn was written to provider but no other appts scheduled and unsure if pt had planned to follow up with provider/specialist regarding imaging results; asked pt to return call to clinic to advise and noted pt would be d/c if no response by mon 8/19

## 2024-10-04 ENCOUNTER — TRANSCRIBE ORDERS (OUTPATIENT)
Facility: HOSPITAL | Age: 44
End: 2024-10-04

## 2024-10-04 ENCOUNTER — HOSPITAL ENCOUNTER (OUTPATIENT)
Facility: HOSPITAL | Age: 44
Discharge: HOME | End: 2024-10-07
Payer: MEDICAID

## 2024-10-04 DIAGNOSIS — M79.642 LEFT HAND PAIN: ICD-10-CM

## 2024-10-04 DIAGNOSIS — M79.642 LEFT HAND PAIN: Primary | ICD-10-CM

## 2024-10-04 PROCEDURE — 73110 X-RAY EXAM OF WRIST: CPT

## 2024-10-04 PROCEDURE — 73130 X-RAY EXAM OF HAND: CPT
